# Patient Record
Sex: MALE | Race: WHITE | NOT HISPANIC OR LATINO
[De-identification: names, ages, dates, MRNs, and addresses within clinical notes are randomized per-mention and may not be internally consistent; named-entity substitution may affect disease eponyms.]

---

## 2017-03-20 ENCOUNTER — APPOINTMENT (OUTPATIENT)
Dept: INTERNAL MEDICINE | Facility: CLINIC | Age: 57
End: 2017-03-20

## 2017-03-20 VITALS
BODY MASS INDEX: 25.05 KG/M2 | HEIGHT: 70 IN | WEIGHT: 175 LBS | DIASTOLIC BLOOD PRESSURE: 85 MMHG | SYSTOLIC BLOOD PRESSURE: 122 MMHG

## 2017-03-20 DIAGNOSIS — H10.10 ACUTE ATOPIC CONJUNCTIVITIS, UNSPECIFIED EYE: ICD-10-CM

## 2017-05-12 ENCOUNTER — RX RENEWAL (OUTPATIENT)
Age: 57
End: 2017-05-12

## 2017-05-15 ENCOUNTER — APPOINTMENT (OUTPATIENT)
Dept: INTERNAL MEDICINE | Facility: CLINIC | Age: 57
End: 2017-05-15

## 2017-05-15 VITALS — DIASTOLIC BLOOD PRESSURE: 65 MMHG | HEART RATE: 72 BPM | RESPIRATION RATE: 16 BRPM | SYSTOLIC BLOOD PRESSURE: 115 MMHG

## 2017-05-15 VITALS — BODY MASS INDEX: 24.05 KG/M2 | HEIGHT: 70 IN | WEIGHT: 168 LBS

## 2017-05-15 DIAGNOSIS — C80.1 MALIGNANT (PRIMARY) NEOPLASM, UNSPECIFIED: ICD-10-CM

## 2017-05-15 DIAGNOSIS — R63.4 ABNORMAL WEIGHT LOSS: ICD-10-CM

## 2017-05-16 ENCOUNTER — RESULT CHARGE (OUTPATIENT)
Age: 57
End: 2017-05-16

## 2017-05-16 LAB
ALBUMIN SERPL ELPH-MCNC: 3.8 G/DL
ALP BLD-CCNC: 101 U/L
ALT SERPL-CCNC: 16 U/L
ANION GAP SERPL CALC-SCNC: 13 MMOL/L
AST SERPL-CCNC: 17 U/L
BASOPHILS # BLD AUTO: 0.04 K/UL
BASOPHILS NFR BLD AUTO: 0.5 %
BILIRUB SERPL-MCNC: 0.2 MG/DL
BUN SERPL-MCNC: 20 MG/DL
CALCIUM SERPL-MCNC: 9.4 MG/DL
CHLORIDE SERPL-SCNC: 104 MMOL/L
CO2 SERPL-SCNC: 27 MMOL/L
CREAT SERPL-MCNC: 1.08 MG/DL
EOSINOPHIL # BLD AUTO: 0.95 K/UL
EOSINOPHIL NFR BLD AUTO: 10.8 %
GLUCOSE BLDC GLUCOMTR-MCNC: 87
GLUCOSE SERPL-MCNC: 86 MG/DL
HBA1C MFR BLD HPLC: 5.2
HCT VFR BLD CALC: 45.6 %
HGB BLD-MCNC: 14.1 G/DL
IMM GRANULOCYTES NFR BLD AUTO: 0.1 %
LYMPHOCYTES # BLD AUTO: 1.73 K/UL
LYMPHOCYTES NFR BLD AUTO: 19.7 %
MAN DIFF?: NORMAL
MCHC RBC-ENTMCNC: 29.1 PG
MCHC RBC-ENTMCNC: 30.9 GM/DL
MCV RBC AUTO: 94.2 FL
MONOCYTES # BLD AUTO: 0.95 K/UL
MONOCYTES NFR BLD AUTO: 10.8 %
NEUTROPHILS # BLD AUTO: 5.09 K/UL
NEUTROPHILS NFR BLD AUTO: 58.1 %
PLATELET # BLD AUTO: 262 K/UL
POTASSIUM SERPL-SCNC: 4.4 MMOL/L
PROT SERPL-MCNC: 6.8 G/DL
RBC # BLD: 4.84 M/UL
RBC # FLD: 12.9 %
SODIUM SERPL-SCNC: 144 MMOL/L
T4 FREE SERPL-MCNC: 1.1 NG/DL
TSH SERPL-ACNC: 1.15 UIU/ML
WBC # FLD AUTO: 8.77 K/UL

## 2017-05-24 ENCOUNTER — APPOINTMENT (OUTPATIENT)
Dept: CT IMAGING | Facility: CLINIC | Age: 57
End: 2017-05-24

## 2017-06-30 ENCOUNTER — RX RENEWAL (OUTPATIENT)
Age: 57
End: 2017-06-30

## 2017-07-25 ENCOUNTER — RX RENEWAL (OUTPATIENT)
Age: 57
End: 2017-07-25

## 2017-08-04 ENCOUNTER — APPOINTMENT (OUTPATIENT)
Dept: INTERNAL MEDICINE | Facility: CLINIC | Age: 57
End: 2017-08-04

## 2017-08-29 ENCOUNTER — APPOINTMENT (OUTPATIENT)
Dept: INTERNAL MEDICINE | Facility: CLINIC | Age: 57
End: 2017-08-29

## 2017-09-05 ENCOUNTER — APPOINTMENT (OUTPATIENT)
Dept: INTERNAL MEDICINE | Facility: CLINIC | Age: 57
End: 2017-09-05
Payer: MEDICARE

## 2017-09-05 VITALS
HEART RATE: 90 BPM | DIASTOLIC BLOOD PRESSURE: 68 MMHG | TEMPERATURE: 99.4 F | HEIGHT: 60 IN | WEIGHT: 168 LBS | BODY MASS INDEX: 32.98 KG/M2 | RESPIRATION RATE: 12 BRPM | SYSTOLIC BLOOD PRESSURE: 107 MMHG

## 2017-09-05 DIAGNOSIS — L01.00 IMPETIGO, UNSPECIFIED: ICD-10-CM

## 2017-09-05 PROCEDURE — 99214 OFFICE O/P EST MOD 30 MIN: CPT

## 2017-09-19 ENCOUNTER — APPOINTMENT (OUTPATIENT)
Dept: INTERNAL MEDICINE | Facility: CLINIC | Age: 57
End: 2017-09-19
Payer: MEDICARE

## 2017-09-19 VITALS — SYSTOLIC BLOOD PRESSURE: 124 MMHG | HEART RATE: 80 BPM | RESPIRATION RATE: 14 BRPM | DIASTOLIC BLOOD PRESSURE: 67 MMHG

## 2017-09-19 VITALS — HEIGHT: 65 IN | BODY MASS INDEX: 27.99 KG/M2 | WEIGHT: 168 LBS

## 2017-09-19 PROCEDURE — 99214 OFFICE O/P EST MOD 30 MIN: CPT | Mod: 25

## 2017-09-19 PROCEDURE — 36415 COLL VENOUS BLD VENIPUNCTURE: CPT

## 2017-09-19 PROCEDURE — 90688 IIV4 VACCINE SPLT 0.5 ML IM: CPT

## 2017-09-19 PROCEDURE — G0008: CPT

## 2017-09-19 RX ORDER — CEPHALEXIN 500 MG/1
500 CAPSULE ORAL 3 TIMES DAILY
Qty: 30 | Refills: 0 | Status: DISCONTINUED | COMMUNITY
Start: 2017-09-05 | End: 2017-09-19

## 2017-09-20 LAB
25(OH)D3 SERPL-MCNC: 23 NG/ML
ALBUMIN SERPL ELPH-MCNC: 3.3 G/DL
ALP BLD-CCNC: 73 U/L
ALT SERPL-CCNC: 26 U/L
ANION GAP SERPL CALC-SCNC: 17 MMOL/L
AST SERPL-CCNC: 23 U/L
BASOPHILS # BLD AUTO: 0.03 K/UL
BASOPHILS NFR BLD AUTO: 0.3 %
BILIRUB SERPL-MCNC: 0.3 MG/DL
BUN SERPL-MCNC: 19 MG/DL
CALCIUM SERPL-MCNC: 9.4 MG/DL
CHLORIDE SERPL-SCNC: 101 MMOL/L
CHOLEST SERPL-MCNC: 92 MG/DL
CHOLEST/HDLC SERPL: 2.4 RATIO
CO2 SERPL-SCNC: 24 MMOL/L
CREAT SERPL-MCNC: 1.21 MG/DL
EOSINOPHIL # BLD AUTO: 0.14 K/UL
EOSINOPHIL NFR BLD AUTO: 1.5 %
FOLATE SERPL-MCNC: 11.2 NG/ML
GLUCOSE SERPL-MCNC: 98 MG/DL
HBA1C MFR BLD HPLC: 5.5 %
HCT VFR BLD CALC: 39.8 %
HDLC SERPL-MCNC: 38 MG/DL
HGB BLD-MCNC: 12.2 G/DL
IMM GRANULOCYTES NFR BLD AUTO: 0.2 %
LDLC SERPL CALC-MCNC: 40 MG/DL
LYMPHOCYTES # BLD AUTO: 1.5 K/UL
LYMPHOCYTES NFR BLD AUTO: 16.3 %
MAN DIFF?: NORMAL
MCHC RBC-ENTMCNC: 28.9 PG
MCHC RBC-ENTMCNC: 30.7 GM/DL
MCV RBC AUTO: 94.3 FL
MONOCYTES # BLD AUTO: 0.88 K/UL
MONOCYTES NFR BLD AUTO: 9.6 %
NEUTROPHILS # BLD AUTO: 6.62 K/UL
NEUTROPHILS NFR BLD AUTO: 72.1 %
PLATELET # BLD AUTO: 435 K/UL
POTASSIUM SERPL-SCNC: 4.8 MMOL/L
PROT SERPL-MCNC: 7.3 G/DL
PSA SERPL-MCNC: 0.76 NG/ML
RBC # BLD: 4.22 M/UL
RBC # FLD: 12.6 %
SODIUM SERPL-SCNC: 142 MMOL/L
T4 FREE SERPL-MCNC: 1.5 NG/DL
TRIGL SERPL-MCNC: 69 MG/DL
TSH SERPL-ACNC: 1.48 UIU/ML
VIT B12 SERPL-MCNC: 397 PG/ML
WBC # FLD AUTO: 9.19 K/UL

## 2017-10-11 ENCOUNTER — RX RENEWAL (OUTPATIENT)
Age: 57
End: 2017-10-11

## 2017-11-02 ENCOUNTER — RX RENEWAL (OUTPATIENT)
Age: 57
End: 2017-11-02

## 2017-12-01 ENCOUNTER — MEDICATION RENEWAL (OUTPATIENT)
Age: 57
End: 2017-12-01

## 2017-12-13 ENCOUNTER — OUTPATIENT (OUTPATIENT)
Dept: OUTPATIENT SERVICES | Facility: HOSPITAL | Age: 57
LOS: 1 days | End: 2017-12-13
Payer: MEDICARE

## 2017-12-13 DIAGNOSIS — S06.2X9A DIFFUSE TRAUMATIC BRAIN INJURY WITH LOSS OF CONSCIOUSNESS OF UNSPECIFIED DURATION, INITIAL ENCOUNTER: ICD-10-CM

## 2017-12-13 PROCEDURE — 95819 EEG AWAKE AND ASLEEP: CPT | Mod: 26

## 2017-12-13 PROCEDURE — 95819 EEG AWAKE AND ASLEEP: CPT

## 2017-12-28 ENCOUNTER — RESULT REVIEW (OUTPATIENT)
Age: 57
End: 2017-12-28

## 2017-12-28 ENCOUNTER — OUTPATIENT (OUTPATIENT)
Dept: OUTPATIENT SERVICES | Facility: HOSPITAL | Age: 57
LOS: 1 days | Discharge: HOME | End: 2017-12-28
Payer: MEDICARE

## 2017-12-28 DIAGNOSIS — L89.150 PRESSURE ULCER OF SACRAL REGION, UNSTAGEABLE: ICD-10-CM

## 2017-12-28 DIAGNOSIS — L97.401 NON-PRESSURE CHRONIC ULCER OF UNSPECIFIED HEEL AND MIDFOOT LIMITED TO BREAKDOWN OF SKIN: ICD-10-CM

## 2017-12-28 PROCEDURE — 88304 TISSUE EXAM BY PATHOLOGIST: CPT | Mod: 26

## 2017-12-28 PROCEDURE — 87070 CULTURE OTHR SPECIMN AEROBIC: CPT

## 2017-12-28 PROCEDURE — 11046 DBRDMT MUSC&/FSCA EA ADDL: CPT

## 2017-12-28 PROCEDURE — G0463: CPT | Mod: 25

## 2017-12-28 PROCEDURE — 73630 X-RAY EXAM OF FOOT: CPT | Mod: 26,50

## 2017-12-28 PROCEDURE — 87075 CULTR BACTERIA EXCEPT BLOOD: CPT

## 2017-12-28 PROCEDURE — 11043 DBRDMT MUSC&/FSCA 1ST 20/<: CPT

## 2017-12-28 PROCEDURE — 87186 SC STD MICRODIL/AGAR DIL: CPT

## 2017-12-28 PROCEDURE — 73630 X-RAY EXAM OF FOOT: CPT

## 2017-12-28 PROCEDURE — 88304 TISSUE EXAM BY PATHOLOGIST: CPT

## 2017-12-29 LAB
GRAM STN FLD: SIGNIFICANT CHANGE UP
SPECIMEN SOURCE: SIGNIFICANT CHANGE UP

## 2017-12-30 DIAGNOSIS — L89.313 PRESSURE ULCER OF RIGHT BUTTOCK, STAGE 3: ICD-10-CM

## 2017-12-30 DIAGNOSIS — A49.8 OTHER BACTERIAL INFECTIONS OF UNSPECIFIED SITE: ICD-10-CM

## 2017-12-30 DIAGNOSIS — E66.3 OVERWEIGHT: ICD-10-CM

## 2017-12-30 DIAGNOSIS — L89.623 PRESSURE ULCER OF LEFT HEEL, STAGE 3: ICD-10-CM

## 2017-12-30 DIAGNOSIS — Z82.49 FAMILY HISTORY OF ISCHEMIC HEART DISEASE AND OTHER DISEASES OF THE CIRCULATORY SYSTEM: ICD-10-CM

## 2017-12-30 DIAGNOSIS — L89.221 PRESSURE ULCER OF LEFT HIP, STAGE 1: ICD-10-CM

## 2017-12-30 DIAGNOSIS — R15.9 FULL INCONTINENCE OF FECES: ICD-10-CM

## 2017-12-30 DIAGNOSIS — Z79.899 OTHER LONG TERM (CURRENT) DRUG THERAPY: ICD-10-CM

## 2017-12-30 DIAGNOSIS — Z80.8 FAMILY HISTORY OF MALIGNANT NEOPLASM OF OTHER ORGANS OR SYSTEMS: ICD-10-CM

## 2017-12-30 DIAGNOSIS — R32 UNSPECIFIED URINARY INCONTINENCE: ICD-10-CM

## 2017-12-30 DIAGNOSIS — L89.613 PRESSURE ULCER OF RIGHT HEEL, STAGE 3: ICD-10-CM

## 2017-12-30 DIAGNOSIS — L89.520 PRESSURE ULCER OF LEFT ANKLE, UNSTAGEABLE: ICD-10-CM

## 2017-12-30 DIAGNOSIS — F01.50 VASCULAR DEMENTIA WITHOUT BEHAVIORAL DISTURBANCE: ICD-10-CM

## 2017-12-30 DIAGNOSIS — R47.02 DYSPHASIA: ICD-10-CM

## 2017-12-30 LAB
-  AMIKACIN: SIGNIFICANT CHANGE UP
-  AZTREONAM: SIGNIFICANT CHANGE UP
-  CEFEPIME: SIGNIFICANT CHANGE UP
-  CEFTAZIDIME: SIGNIFICANT CHANGE UP
-  CIPROFLOXACIN: SIGNIFICANT CHANGE UP
-  GENTAMICIN: SIGNIFICANT CHANGE UP
-  IMIPENEM: SIGNIFICANT CHANGE UP
-  LEVOFLOXACIN: SIGNIFICANT CHANGE UP
-  MEROPENEM: SIGNIFICANT CHANGE UP
-  PIPERACILLIN/TAZOBACTAM: SIGNIFICANT CHANGE UP
-  TOBRAMYCIN: SIGNIFICANT CHANGE UP
METHOD TYPE: SIGNIFICANT CHANGE UP

## 2017-12-31 LAB
-  AMIKACIN: SIGNIFICANT CHANGE UP
-  AMPICILLIN/SULBACTAM: SIGNIFICANT CHANGE UP
-  AMPICILLIN/SULBACTAM: SIGNIFICANT CHANGE UP
-  AMPICILLIN: SIGNIFICANT CHANGE UP
-  AZTREONAM: SIGNIFICANT CHANGE UP
-  CEFAZOLIN: SIGNIFICANT CHANGE UP
-  CEFAZOLIN: SIGNIFICANT CHANGE UP
-  CEFEPIME: SIGNIFICANT CHANGE UP
-  CEFOXITIN: SIGNIFICANT CHANGE UP
-  CEFTAZIDIME: SIGNIFICANT CHANGE UP
-  CEFTRIAXONE: SIGNIFICANT CHANGE UP
-  CIPROFLOXACIN: SIGNIFICANT CHANGE UP
-  CIPROFLOXACIN: SIGNIFICANT CHANGE UP
-  CLINDAMYCIN: SIGNIFICANT CHANGE UP
-  ERTAPENEM: SIGNIFICANT CHANGE UP
-  ERYTHROMYCIN: SIGNIFICANT CHANGE UP
-  GENTAMICIN: SIGNIFICANT CHANGE UP
-  GENTAMICIN: SIGNIFICANT CHANGE UP
-  IMIPENEM: SIGNIFICANT CHANGE UP
-  LEVOFLOXACIN: SIGNIFICANT CHANGE UP
-  LEVOFLOXACIN: SIGNIFICANT CHANGE UP
-  MEROPENEM: SIGNIFICANT CHANGE UP
-  MOXIFLOXACIN(AEROBIC): SIGNIFICANT CHANGE UP
-  OXACILLIN: SIGNIFICANT CHANGE UP
-  PENICILLIN: SIGNIFICANT CHANGE UP
-  PIPERACILLIN/TAZOBACTAM: SIGNIFICANT CHANGE UP
-  RIFAMPIN: SIGNIFICANT CHANGE UP
-  TETRACYCLINE: SIGNIFICANT CHANGE UP
-  TOBRAMYCIN: SIGNIFICANT CHANGE UP
-  TRIMETHOPRIM/SULFAMETHOXAZOLE: SIGNIFICANT CHANGE UP
-  TRIMETHOPRIM/SULFAMETHOXAZOLE: SIGNIFICANT CHANGE UP
-  VANCOMYCIN: SIGNIFICANT CHANGE UP
METHOD TYPE: SIGNIFICANT CHANGE UP
METHOD TYPE: SIGNIFICANT CHANGE UP

## 2018-01-02 LAB
CULTURE RESULTS: SIGNIFICANT CHANGE UP
ORGANISM # SPEC MICROSCOPIC CNT: SIGNIFICANT CHANGE UP
SPECIMEN SOURCE: SIGNIFICANT CHANGE UP
SURGICAL PATHOLOGY FINAL REPORT - CH: SIGNIFICANT CHANGE UP

## 2018-01-08 ENCOUNTER — RX RENEWAL (OUTPATIENT)
Age: 58
End: 2018-01-08

## 2018-01-09 ENCOUNTER — MEDICATION RENEWAL (OUTPATIENT)
Age: 58
End: 2018-01-09

## 2018-01-11 ENCOUNTER — OUTPATIENT (OUTPATIENT)
Dept: OUTPATIENT SERVICES | Facility: HOSPITAL | Age: 58
LOS: 1 days | Discharge: HOME | End: 2018-01-11
Payer: MEDICARE

## 2018-01-11 DIAGNOSIS — L89.623 PRESSURE ULCER OF LEFT HEEL, STAGE 3: ICD-10-CM

## 2018-01-11 PROCEDURE — 11042 DBRDMT SUBQ TIS 1ST 20SQCM/<: CPT

## 2018-01-14 DIAGNOSIS — F01.50 VASCULAR DEMENTIA WITHOUT BEHAVIORAL DISTURBANCE: ICD-10-CM

## 2018-01-14 DIAGNOSIS — R32 UNSPECIFIED URINARY INCONTINENCE: ICD-10-CM

## 2018-01-14 DIAGNOSIS — E66.3 OVERWEIGHT: ICD-10-CM

## 2018-01-14 DIAGNOSIS — L89.221 PRESSURE ULCER OF LEFT HIP, STAGE 1: ICD-10-CM

## 2018-01-14 DIAGNOSIS — Z80.8 FAMILY HISTORY OF MALIGNANT NEOPLASM OF OTHER ORGANS OR SYSTEMS: ICD-10-CM

## 2018-01-14 DIAGNOSIS — R15.9 FULL INCONTINENCE OF FECES: ICD-10-CM

## 2018-01-14 DIAGNOSIS — L89.520 PRESSURE ULCER OF LEFT ANKLE, UNSTAGEABLE: ICD-10-CM

## 2018-01-14 DIAGNOSIS — L89.313 PRESSURE ULCER OF RIGHT BUTTOCK, STAGE 3: ICD-10-CM

## 2018-01-14 DIAGNOSIS — L89.613 PRESSURE ULCER OF RIGHT HEEL, STAGE 3: ICD-10-CM

## 2018-01-14 DIAGNOSIS — Z82.49 FAMILY HISTORY OF ISCHEMIC HEART DISEASE AND OTHER DISEASES OF THE CIRCULATORY SYSTEM: ICD-10-CM

## 2018-01-14 DIAGNOSIS — L89.623 PRESSURE ULCER OF LEFT HEEL, STAGE 3: ICD-10-CM

## 2018-01-14 DIAGNOSIS — Z79.899 OTHER LONG TERM (CURRENT) DRUG THERAPY: ICD-10-CM

## 2018-01-14 DIAGNOSIS — R47.02 DYSPHASIA: ICD-10-CM

## 2018-01-23 ENCOUNTER — OUTPATIENT (OUTPATIENT)
Dept: OUTPATIENT SERVICES | Facility: HOSPITAL | Age: 58
LOS: 1 days | Discharge: HOME | End: 2018-01-23
Payer: MEDICARE

## 2018-01-23 DIAGNOSIS — L89.623 PRESSURE ULCER OF LEFT HEEL, STAGE 3: ICD-10-CM

## 2018-01-23 PROCEDURE — 97602 WOUND(S) CARE NON-SELECTIVE: CPT

## 2018-01-23 PROCEDURE — 99213 OFFICE O/P EST LOW 20 MIN: CPT

## 2018-01-25 DIAGNOSIS — L89.520 PRESSURE ULCER OF LEFT ANKLE, UNSTAGEABLE: ICD-10-CM

## 2018-01-25 DIAGNOSIS — L89.313 PRESSURE ULCER OF RIGHT BUTTOCK, STAGE 3: ICD-10-CM

## 2018-01-25 DIAGNOSIS — R15.9 FULL INCONTINENCE OF FECES: ICD-10-CM

## 2018-01-25 DIAGNOSIS — Z80.8 FAMILY HISTORY OF MALIGNANT NEOPLASM OF OTHER ORGANS OR SYSTEMS: ICD-10-CM

## 2018-01-25 DIAGNOSIS — R47.02 DYSPHASIA: ICD-10-CM

## 2018-01-25 DIAGNOSIS — E66.3 OVERWEIGHT: ICD-10-CM

## 2018-01-25 DIAGNOSIS — L89.613 PRESSURE ULCER OF RIGHT HEEL, STAGE 3: ICD-10-CM

## 2018-01-25 DIAGNOSIS — R32 UNSPECIFIED URINARY INCONTINENCE: ICD-10-CM

## 2018-01-25 DIAGNOSIS — F01.50 VASCULAR DEMENTIA WITHOUT BEHAVIORAL DISTURBANCE: ICD-10-CM

## 2018-01-25 DIAGNOSIS — Z79.899 OTHER LONG TERM (CURRENT) DRUG THERAPY: ICD-10-CM

## 2018-01-25 DIAGNOSIS — Z82.49 FAMILY HISTORY OF ISCHEMIC HEART DISEASE AND OTHER DISEASES OF THE CIRCULATORY SYSTEM: ICD-10-CM

## 2018-01-25 DIAGNOSIS — L84 CORNS AND CALLOSITIES: ICD-10-CM

## 2018-01-25 DIAGNOSIS — L89.623 PRESSURE ULCER OF LEFT HEEL, STAGE 3: ICD-10-CM

## 2018-02-14 ENCOUNTER — OUTPATIENT (OUTPATIENT)
Dept: OUTPATIENT SERVICES | Facility: HOSPITAL | Age: 58
LOS: 1 days | Discharge: HOME | End: 2018-02-14
Payer: MEDICARE

## 2018-02-14 DIAGNOSIS — L89.623 PRESSURE ULCER OF LEFT HEEL, STAGE 3: ICD-10-CM

## 2018-02-14 PROCEDURE — 97602 WOUND(S) CARE NON-SELECTIVE: CPT

## 2018-02-14 PROCEDURE — 99213 OFFICE O/P EST LOW 20 MIN: CPT

## 2018-02-15 DIAGNOSIS — Z80.8 FAMILY HISTORY OF MALIGNANT NEOPLASM OF OTHER ORGANS OR SYSTEMS: ICD-10-CM

## 2018-02-15 DIAGNOSIS — Z82.49 FAMILY HISTORY OF ISCHEMIC HEART DISEASE AND OTHER DISEASES OF THE CIRCULATORY SYSTEM: ICD-10-CM

## 2018-02-15 DIAGNOSIS — L89.153 PRESSURE ULCER OF SACRAL REGION, STAGE 3: ICD-10-CM

## 2018-02-15 DIAGNOSIS — L89.613 PRESSURE ULCER OF RIGHT HEEL, STAGE 3: ICD-10-CM

## 2018-02-15 DIAGNOSIS — L89.623 PRESSURE ULCER OF LEFT HEEL, STAGE 3: ICD-10-CM

## 2018-02-15 DIAGNOSIS — R32 UNSPECIFIED URINARY INCONTINENCE: ICD-10-CM

## 2018-02-15 DIAGNOSIS — L89.313 PRESSURE ULCER OF RIGHT BUTTOCK, STAGE 3: ICD-10-CM

## 2018-02-15 DIAGNOSIS — E66.3 OVERWEIGHT: ICD-10-CM

## 2018-02-15 DIAGNOSIS — R15.9 FULL INCONTINENCE OF FECES: ICD-10-CM

## 2018-02-15 DIAGNOSIS — L89.520 PRESSURE ULCER OF LEFT ANKLE, UNSTAGEABLE: ICD-10-CM

## 2018-02-15 DIAGNOSIS — F01.50 VASCULAR DEMENTIA WITHOUT BEHAVIORAL DISTURBANCE: ICD-10-CM

## 2018-02-15 DIAGNOSIS — R47.02 DYSPHASIA: ICD-10-CM

## 2018-02-15 DIAGNOSIS — L84 CORNS AND CALLOSITIES: ICD-10-CM

## 2018-02-15 DIAGNOSIS — Z79.899 OTHER LONG TERM (CURRENT) DRUG THERAPY: ICD-10-CM

## 2018-02-19 ENCOUNTER — EMERGENCY (EMERGENCY)
Facility: HOSPITAL | Age: 58
LOS: 1 days | Discharge: HOME | End: 2018-02-19
Attending: STUDENT IN AN ORGANIZED HEALTH CARE EDUCATION/TRAINING PROGRAM
Payer: MEDICARE

## 2018-02-19 VITALS
OXYGEN SATURATION: 100 % | SYSTOLIC BLOOD PRESSURE: 101 MMHG | HEART RATE: 83 BPM | DIASTOLIC BLOOD PRESSURE: 73 MMHG | TEMPERATURE: 98 F | RESPIRATION RATE: 20 BRPM

## 2018-02-19 LAB
APTT BLD: 33.4 SEC — SIGNIFICANT CHANGE UP (ref 27.5–37.4)
BASOPHILS # BLD AUTO: 0 K/UL — SIGNIFICANT CHANGE UP (ref 0–0.2)
BASOPHILS NFR BLD AUTO: 0.3 % — SIGNIFICANT CHANGE UP (ref 0–2)
EOSINOPHIL # BLD AUTO: 0.2 K/UL — SIGNIFICANT CHANGE UP (ref 0–0.5)
EOSINOPHIL NFR BLD AUTO: 3.3 % — SIGNIFICANT CHANGE UP (ref 0–6)
HCT VFR BLD CALC: 42.7 % — SIGNIFICANT CHANGE UP (ref 42–52)
HGB BLD-MCNC: 13.3 G/DL — LOW (ref 14–18)
INR BLD: 1.1 RATIO — SIGNIFICANT CHANGE UP (ref 0.88–1.16)
LYMPHOCYTES # BLD AUTO: 1.7 K/UL — SIGNIFICANT CHANGE UP (ref 1–4.8)
LYMPHOCYTES # BLD AUTO: 27.6 % — SIGNIFICANT CHANGE UP (ref 20–55)
MCHC RBC-ENTMCNC: 27.7 PG — SIGNIFICANT CHANGE UP (ref 27–31)
MCHC RBC-ENTMCNC: 31.1 G/DL — LOW (ref 32–36)
MCV RBC AUTO: 89 FL — SIGNIFICANT CHANGE UP (ref 80–94)
MONOCYTES # BLD AUTO: 0.7 K/UL — SIGNIFICANT CHANGE UP (ref 0–0.8)
MONOCYTES NFR BLD AUTO: 11 % — HIGH (ref 3–10)
NEUTROPHILS # BLD AUTO: 3.6 K/UL — SIGNIFICANT CHANGE UP (ref 1.8–8)
NEUTROPHILS NFR BLD AUTO: 57.6 % — SIGNIFICANT CHANGE UP (ref 37–73)
PLATELET # BLD AUTO: 257 K/UL — SIGNIFICANT CHANGE UP (ref 150–400)
PROTHROM AB SERPL-ACNC: 12.1 SEC — SIGNIFICANT CHANGE UP (ref 9.8–12.7)
RBC # BLD: 4.8 M/UL — SIGNIFICANT CHANGE UP (ref 4.6–6.2)
RBC # FLD: 13.1 % — SIGNIFICANT CHANGE UP (ref 11–15.6)
WBC # BLD: 6.3 K/UL — SIGNIFICANT CHANGE UP (ref 4.8–10.8)
WBC # FLD AUTO: 6.3 K/UL — SIGNIFICANT CHANGE UP (ref 4.8–10.8)

## 2018-02-19 PROCEDURE — 85610 PROTHROMBIN TIME: CPT

## 2018-02-19 PROCEDURE — 84484 ASSAY OF TROPONIN QUANT: CPT

## 2018-02-19 PROCEDURE — 93010 ELECTROCARDIOGRAM REPORT: CPT

## 2018-02-19 PROCEDURE — 70450 CT HEAD/BRAIN W/O DYE: CPT

## 2018-02-19 PROCEDURE — 93005 ELECTROCARDIOGRAM TRACING: CPT

## 2018-02-19 PROCEDURE — 36415 COLL VENOUS BLD VENIPUNCTURE: CPT

## 2018-02-19 PROCEDURE — 99284 EMERGENCY DEPT VISIT MOD MDM: CPT

## 2018-02-19 PROCEDURE — 85730 THROMBOPLASTIN TIME PARTIAL: CPT

## 2018-02-19 PROCEDURE — 80053 COMPREHEN METABOLIC PANEL: CPT

## 2018-02-19 PROCEDURE — 82962 GLUCOSE BLOOD TEST: CPT

## 2018-02-19 PROCEDURE — 85027 COMPLETE CBC AUTOMATED: CPT

## 2018-02-19 PROCEDURE — 71045 X-RAY EXAM CHEST 1 VIEW: CPT

## 2018-02-19 PROCEDURE — 80307 DRUG TEST PRSMV CHEM ANLYZR: CPT

## 2018-02-19 PROCEDURE — 70450 CT HEAD/BRAIN W/O DYE: CPT | Mod: 26

## 2018-02-19 PROCEDURE — 99284 EMERGENCY DEPT VISIT MOD MDM: CPT | Mod: 25

## 2018-02-19 NOTE — ED PROVIDER NOTE - PROGRESS NOTE DETAILS
Results reviewed with family who is also primary caretaker. Admission recommended but she states given pt's chronic disease she feels most comfortable with D/C to f/u with his neurologist stating the hospital will make his general condition worse. Family informed sx are likely secondary to disease but it is unclear which is why admission is suggested. Pt's mother however would rather be D/C. Pt is stable and appears tired at this time.

## 2018-02-19 NOTE — ED ADULT TRIAGE NOTE - CHIEF COMPLAINT QUOTE
Pt BIBA for AMS.  Pt is non-verbal from prior stroke.  Does not follow commands.  As per Mother, pt had change in mental status at approx 2000.  Dr. Dougherty called to bedside r/o stroke.

## 2018-02-19 NOTE — ED PROVIDER NOTE - OBJECTIVE STATEMENT
59 y/o M w/ PMHx of CVA presents to ED c/o AMS x2 hours. Per mother, pt was unresponsive to commands and more fatigued than baseline around 20:00 today. Pt's mother notes he was unable to tolerate PO as she attempted to give him ice cream. Pt's sx did not resolve prompting her to call EMS at 22:04. Per EMS, pt was able to follow some commands and became more like his normal baseline around 22:42. Pt took medication today (last dose 20:00 today). Pt's mother reports after CVA 5 years ago, pt began to deteriorate and has been nonverbal x5 months. He walks with walker. Denies facial droop or any other complaints at this time. 59 y/o M w/ PMHx of CVA presents to ED c/o AMS x2 hours. Per mother, pt was unresponsive to commands and more fatigued than baseline around 20:00 today. Pt's mother notes he was unable to tolerate PO during episode but ate dinner at 19:00. Pt's sx did not resolve prompting her to call EMS at 22:04. Per EMS, pt was able to follow some commands and became more like his normal baseline around 22:42. Pt took his evening medications today at 20:00. Pt's mother reports after CVA 5 years ago, pt began to deteriorate and has been nonverbal x5 months. He walks with walker. Pt's mother notes pt has a CT scan scheduled for the near future, ordered by his neurologist to assess for change in medication to decrease sedative effects. Denies facial droop or any other complaints at this time. 57 y/o M w/ PMHx of CVA presents to ED c/o AMS x2 hours. Per mother, pt was unresponsive to commands and more fatigued than baseline around 20:00 today. Pt's mother notes he was unable to tolerate PO during episode but ate dinner at 19:00. Pt's sx did not resolve prompting her to call EMS at 22:04. Per EMS, pt was able to follow some commands and became more like his normal baseline around 22:42. Pt took his evening medications today at 20:00. Pt's mother reports after CVA 5 years ago, pt began to deteriorate and has been nonverbal x5 months and continues to get worse. He walks with walker. Pt's mother notes pt has a CT scan scheduled for the near future, ordered by his neurologist to assess for change in medication to decrease sedative effects. Denies facial droop or any other complaints at this time.

## 2018-02-20 VITALS
OXYGEN SATURATION: 100 % | SYSTOLIC BLOOD PRESSURE: 115 MMHG | HEART RATE: 78 BPM | RESPIRATION RATE: 20 BRPM | DIASTOLIC BLOOD PRESSURE: 70 MMHG | TEMPERATURE: 98 F

## 2018-02-20 LAB
ALBUMIN SERPL ELPH-MCNC: 3.6 G/DL — SIGNIFICANT CHANGE UP (ref 3.3–5.2)
ALP SERPL-CCNC: 74 U/L — SIGNIFICANT CHANGE UP (ref 40–120)
ALT FLD-CCNC: 11 U/L — SIGNIFICANT CHANGE UP
ANION GAP SERPL CALC-SCNC: 12 MMOL/L — SIGNIFICANT CHANGE UP (ref 5–17)
AST SERPL-CCNC: 14 U/L — SIGNIFICANT CHANGE UP
BILIRUB SERPL-MCNC: <0.2 MG/DL — LOW (ref 0.4–2)
BUN SERPL-MCNC: 16 MG/DL — SIGNIFICANT CHANGE UP (ref 8–20)
CALCIUM SERPL-MCNC: 9.5 MG/DL — SIGNIFICANT CHANGE UP (ref 8.6–10.2)
CHLORIDE SERPL-SCNC: 101 MMOL/L — SIGNIFICANT CHANGE UP (ref 98–107)
CO2 SERPL-SCNC: 28 MMOL/L — SIGNIFICANT CHANGE UP (ref 22–29)
CREAT SERPL-MCNC: 0.87 MG/DL — SIGNIFICANT CHANGE UP (ref 0.5–1.3)
ETHANOL SERPL-MCNC: <10 MG/DL — SIGNIFICANT CHANGE UP
GLUCOSE SERPL-MCNC: 100 MG/DL — SIGNIFICANT CHANGE UP (ref 70–115)
POTASSIUM SERPL-MCNC: 4.5 MMOL/L — SIGNIFICANT CHANGE UP (ref 3.5–5.3)
POTASSIUM SERPL-SCNC: 4.5 MMOL/L — SIGNIFICANT CHANGE UP (ref 3.5–5.3)
PROT SERPL-MCNC: 7.4 G/DL — SIGNIFICANT CHANGE UP (ref 6.6–8.7)
SODIUM SERPL-SCNC: 141 MMOL/L — SIGNIFICANT CHANGE UP (ref 135–145)
TROPONIN T SERPL-MCNC: <0.01 NG/ML — SIGNIFICANT CHANGE UP (ref 0–0.06)

## 2018-02-20 PROCEDURE — 71045 X-RAY EXAM CHEST 1 VIEW: CPT | Mod: 26

## 2018-02-20 NOTE — ED ADULT NURSE NOTE - OBJECTIVE STATEMENT
Mother reports pt "sort of passed out" ~2000, reports pt was not "coming to". Reports called EMS and pt "came to on bus". Pt presents non-verbal post stroke 5 years ago, caregiver reports this as pt baseline. Pt presents with bed sores on bilat heels, and sacrum, caregiver reports pt recvign home care wound services daily and goes to wound hospital every two weeks.

## 2018-02-20 NOTE — ED ADULT NURSE REASSESSMENT NOTE - NS ED NURSE REASSESS COMMENT FT1
MD at pt bedside, discussed with caregiver, pt to be discharges at this time, caregiver verbalizes understanding and agree with POC, awaiting transportation at this time.  Pt safety maintained.

## 2018-02-21 ENCOUNTER — CHART COPY (OUTPATIENT)
Age: 58
End: 2018-02-21

## 2018-02-23 ENCOUNTER — APPOINTMENT (OUTPATIENT)
Dept: CT IMAGING | Facility: CLINIC | Age: 58
End: 2018-02-23

## 2018-02-28 ENCOUNTER — OUTPATIENT (OUTPATIENT)
Dept: OUTPATIENT SERVICES | Facility: HOSPITAL | Age: 58
LOS: 1 days | Discharge: HOME | End: 2018-02-28
Payer: MEDICARE

## 2018-02-28 DIAGNOSIS — L89.523 PRESSURE ULCER OF LEFT ANKLE, STAGE 3: ICD-10-CM

## 2018-02-28 LAB
ALBUMIN SERPL ELPH-MCNC: 3.2 G/DL — LOW (ref 3.3–5)
ALP SERPL-CCNC: 100 U/L — SIGNIFICANT CHANGE UP (ref 40–120)
ALT FLD-CCNC: 20 U/L — SIGNIFICANT CHANGE UP (ref 12–78)
ANION GAP SERPL CALC-SCNC: 4 MMOL/L — LOW (ref 5–17)
AST SERPL-CCNC: 12 U/L — LOW (ref 15–37)
BASOPHILS # BLD AUTO: 0 K/UL — SIGNIFICANT CHANGE UP (ref 0–0.2)
BASOPHILS NFR BLD AUTO: 0.7 % — SIGNIFICANT CHANGE UP (ref 0–2)
BILIRUB SERPL-MCNC: 0.2 MG/DL — SIGNIFICANT CHANGE UP (ref 0.2–1.2)
BUN SERPL-MCNC: 15 MG/DL — SIGNIFICANT CHANGE UP (ref 7–23)
CALCIUM SERPL-MCNC: 8.8 MG/DL — SIGNIFICANT CHANGE UP (ref 8.5–10.1)
CHLORIDE SERPL-SCNC: 105 MMOL/L — SIGNIFICANT CHANGE UP (ref 96–108)
CO2 SERPL-SCNC: 32 MMOL/L — HIGH (ref 22–31)
CREAT SERPL-MCNC: 0.91 MG/DL — SIGNIFICANT CHANGE UP (ref 0.5–1.3)
EOSINOPHIL # BLD AUTO: 0.2 K/UL — SIGNIFICANT CHANGE UP (ref 0–0.5)
EOSINOPHIL NFR BLD AUTO: 2.8 % — SIGNIFICANT CHANGE UP (ref 0–6)
ERYTHROCYTE [SEDIMENTATION RATE] IN BLOOD: 14 MM/HR — SIGNIFICANT CHANGE UP (ref 0–20)
GLUCOSE SERPL-MCNC: 106 MG/DL — HIGH (ref 70–99)
HBA1C BLD-MCNC: 5.4 % — SIGNIFICANT CHANGE UP (ref 4–5.6)
HCT VFR BLD CALC: 41.7 % — SIGNIFICANT CHANGE UP (ref 39–50)
HGB BLD-MCNC: 13.8 G/DL — SIGNIFICANT CHANGE UP (ref 13–17)
LYMPHOCYTES # BLD AUTO: 1.5 K/UL — SIGNIFICANT CHANGE UP (ref 1–3.3)
LYMPHOCYTES # BLD AUTO: 21.4 % — SIGNIFICANT CHANGE UP (ref 13–44)
MCHC RBC-ENTMCNC: 28.4 PG — SIGNIFICANT CHANGE UP (ref 27–34)
MCHC RBC-ENTMCNC: 33.2 GM/DL — SIGNIFICANT CHANGE UP (ref 32–36)
MCV RBC AUTO: 85.5 FL — SIGNIFICANT CHANGE UP (ref 80–100)
MONOCYTES # BLD AUTO: 0.7 K/UL — SIGNIFICANT CHANGE UP (ref 0–0.9)
MONOCYTES NFR BLD AUTO: 9.8 % — HIGH (ref 1–9)
NEUTROPHILS # BLD AUTO: 4.5 K/UL — SIGNIFICANT CHANGE UP (ref 1.8–7.4)
NEUTROPHILS NFR BLD AUTO: 65.4 % — SIGNIFICANT CHANGE UP (ref 43–77)
PLATELET # BLD AUTO: 240 K/UL — SIGNIFICANT CHANGE UP (ref 150–400)
POTASSIUM SERPL-MCNC: 4 MMOL/L — SIGNIFICANT CHANGE UP (ref 3.5–5.3)
POTASSIUM SERPL-SCNC: 4 MMOL/L — SIGNIFICANT CHANGE UP (ref 3.5–5.3)
PROT SERPL-MCNC: 7.3 G/DL — SIGNIFICANT CHANGE UP (ref 6–8.3)
RBC # BLD: 4.88 M/UL — SIGNIFICANT CHANGE UP (ref 4.2–5.8)
RBC # FLD: 11.9 % — SIGNIFICANT CHANGE UP (ref 10.3–14.5)
SODIUM SERPL-SCNC: 141 MMOL/L — SIGNIFICANT CHANGE UP (ref 135–145)
WBC # BLD: 6.8 K/UL — SIGNIFICANT CHANGE UP (ref 3.8–10.5)
WBC # FLD AUTO: 6.8 K/UL — SIGNIFICANT CHANGE UP (ref 3.8–10.5)

## 2018-02-28 PROCEDURE — 86140 C-REACTIVE PROTEIN: CPT

## 2018-02-28 PROCEDURE — 83036 HEMOGLOBIN GLYCOSYLATED A1C: CPT

## 2018-02-28 PROCEDURE — 80053 COMPREHEN METABOLIC PANEL: CPT

## 2018-02-28 PROCEDURE — 85027 COMPLETE CBC AUTOMATED: CPT

## 2018-02-28 PROCEDURE — 85652 RBC SED RATE AUTOMATED: CPT

## 2018-02-28 PROCEDURE — 97602 WOUND(S) CARE NON-SELECTIVE: CPT

## 2018-03-01 DIAGNOSIS — R15.9 FULL INCONTINENCE OF FECES: ICD-10-CM

## 2018-03-01 DIAGNOSIS — F01.50 VASCULAR DEMENTIA WITHOUT BEHAVIORAL DISTURBANCE: ICD-10-CM

## 2018-03-01 DIAGNOSIS — L89.623 PRESSURE ULCER OF LEFT HEEL, STAGE 3: ICD-10-CM

## 2018-03-01 DIAGNOSIS — R32 UNSPECIFIED URINARY INCONTINENCE: ICD-10-CM

## 2018-03-01 DIAGNOSIS — L89.313 PRESSURE ULCER OF RIGHT BUTTOCK, STAGE 3: ICD-10-CM

## 2018-03-01 DIAGNOSIS — Z80.8 FAMILY HISTORY OF MALIGNANT NEOPLASM OF OTHER ORGANS OR SYSTEMS: ICD-10-CM

## 2018-03-01 DIAGNOSIS — L89.153 PRESSURE ULCER OF SACRAL REGION, STAGE 3: ICD-10-CM

## 2018-03-01 DIAGNOSIS — R47.02 DYSPHASIA: ICD-10-CM

## 2018-03-01 DIAGNOSIS — Z82.49 FAMILY HISTORY OF ISCHEMIC HEART DISEASE AND OTHER DISEASES OF THE CIRCULATORY SYSTEM: ICD-10-CM

## 2018-03-01 DIAGNOSIS — E66.3 OVERWEIGHT: ICD-10-CM

## 2018-03-01 DIAGNOSIS — Z79.899 OTHER LONG TERM (CURRENT) DRUG THERAPY: ICD-10-CM

## 2018-03-01 DIAGNOSIS — L89.520 PRESSURE ULCER OF LEFT ANKLE, UNSTAGEABLE: ICD-10-CM

## 2018-03-01 LAB — CRP SERPL-MCNC: <0.2 MG/DL — SIGNIFICANT CHANGE UP (ref 0–0.4)

## 2018-03-08 ENCOUNTER — OUTPATIENT (OUTPATIENT)
Dept: OUTPATIENT SERVICES | Facility: HOSPITAL | Age: 58
LOS: 1 days | Discharge: HOME | End: 2018-03-08
Payer: MEDICARE

## 2018-03-08 DIAGNOSIS — L89.623 PRESSURE ULCER OF LEFT HEEL, STAGE 3: ICD-10-CM

## 2018-03-08 PROCEDURE — G0463: CPT | Mod: 25

## 2018-03-08 PROCEDURE — 97602 WOUND(S) CARE NON-SELECTIVE: CPT

## 2018-03-10 DIAGNOSIS — R15.9 FULL INCONTINENCE OF FECES: ICD-10-CM

## 2018-03-10 DIAGNOSIS — Z79.899 OTHER LONG TERM (CURRENT) DRUG THERAPY: ICD-10-CM

## 2018-03-10 DIAGNOSIS — F01.50 VASCULAR DEMENTIA WITHOUT BEHAVIORAL DISTURBANCE: ICD-10-CM

## 2018-03-10 DIAGNOSIS — R21 RASH AND OTHER NONSPECIFIC SKIN ERUPTION: ICD-10-CM

## 2018-03-10 DIAGNOSIS — Z82.49 FAMILY HISTORY OF ISCHEMIC HEART DISEASE AND OTHER DISEASES OF THE CIRCULATORY SYSTEM: ICD-10-CM

## 2018-03-10 DIAGNOSIS — L89.520 PRESSURE ULCER OF LEFT ANKLE, UNSTAGEABLE: ICD-10-CM

## 2018-03-10 DIAGNOSIS — L89.623 PRESSURE ULCER OF LEFT HEEL, STAGE 3: ICD-10-CM

## 2018-03-10 DIAGNOSIS — L89.153 PRESSURE ULCER OF SACRAL REGION, STAGE 3: ICD-10-CM

## 2018-03-10 DIAGNOSIS — R47.02 DYSPHASIA: ICD-10-CM

## 2018-03-10 DIAGNOSIS — L89.313 PRESSURE ULCER OF RIGHT BUTTOCK, STAGE 3: ICD-10-CM

## 2018-03-10 DIAGNOSIS — R32 UNSPECIFIED URINARY INCONTINENCE: ICD-10-CM

## 2018-03-10 DIAGNOSIS — Z80.8 FAMILY HISTORY OF MALIGNANT NEOPLASM OF OTHER ORGANS OR SYSTEMS: ICD-10-CM

## 2018-03-17 ENCOUNTER — OUTPATIENT (OUTPATIENT)
Dept: OUTPATIENT SERVICES | Facility: HOSPITAL | Age: 58
LOS: 1 days | End: 2018-03-17
Payer: MEDICARE

## 2018-03-17 ENCOUNTER — APPOINTMENT (OUTPATIENT)
Dept: MRI IMAGING | Facility: CLINIC | Age: 58
End: 2018-03-17
Payer: MEDICARE

## 2018-03-17 DIAGNOSIS — Z00.8 ENCOUNTER FOR OTHER GENERAL EXAMINATION: ICD-10-CM

## 2018-03-17 PROCEDURE — 73718 MRI LOWER EXTREMITY W/O DYE: CPT

## 2018-03-17 PROCEDURE — 73718 MRI LOWER EXTREMITY W/O DYE: CPT | Mod: 26,LT

## 2018-03-29 ENCOUNTER — OUTPATIENT (OUTPATIENT)
Dept: OUTPATIENT SERVICES | Facility: HOSPITAL | Age: 58
LOS: 1 days | Discharge: HOME | End: 2018-03-29
Payer: MEDICARE

## 2018-03-29 DIAGNOSIS — L89.623 PRESSURE ULCER OF LEFT HEEL, STAGE 3: ICD-10-CM

## 2018-03-29 PROCEDURE — 11042 DBRDMT SUBQ TIS 1ST 20SQCM/<: CPT

## 2018-03-31 DIAGNOSIS — L89.893 PRESSURE ULCER OF OTHER SITE, STAGE 3: ICD-10-CM

## 2018-03-31 DIAGNOSIS — R15.9 FULL INCONTINENCE OF FECES: ICD-10-CM

## 2018-03-31 DIAGNOSIS — R47.02 DYSPHASIA: ICD-10-CM

## 2018-03-31 DIAGNOSIS — Z82.49 FAMILY HISTORY OF ISCHEMIC HEART DISEASE AND OTHER DISEASES OF THE CIRCULATORY SYSTEM: ICD-10-CM

## 2018-03-31 DIAGNOSIS — Z79.899 OTHER LONG TERM (CURRENT) DRUG THERAPY: ICD-10-CM

## 2018-03-31 DIAGNOSIS — Z80.8 FAMILY HISTORY OF MALIGNANT NEOPLASM OF OTHER ORGANS OR SYSTEMS: ICD-10-CM

## 2018-03-31 DIAGNOSIS — L89.623 PRESSURE ULCER OF LEFT HEEL, STAGE 3: ICD-10-CM

## 2018-03-31 DIAGNOSIS — R32 UNSPECIFIED URINARY INCONTINENCE: ICD-10-CM

## 2018-03-31 DIAGNOSIS — E66.3 OVERWEIGHT: ICD-10-CM

## 2018-04-04 ENCOUNTER — APPOINTMENT (OUTPATIENT)
Dept: DERMATOLOGY | Facility: CLINIC | Age: 58
End: 2018-04-04
Payer: MEDICARE

## 2018-04-04 PROCEDURE — 99202 OFFICE O/P NEW SF 15 MIN: CPT

## 2018-04-20 ENCOUNTER — OUTPATIENT (OUTPATIENT)
Dept: OUTPATIENT SERVICES | Facility: HOSPITAL | Age: 58
LOS: 1 days | Discharge: HOME | End: 2018-04-20
Payer: MEDICARE

## 2018-04-20 DIAGNOSIS — L89.623 PRESSURE ULCER OF LEFT HEEL, STAGE 3: ICD-10-CM

## 2018-04-20 PROCEDURE — 11042 DBRDMT SUBQ TIS 1ST 20SQCM/<: CPT

## 2018-05-04 ENCOUNTER — OUTPATIENT (OUTPATIENT)
Dept: OUTPATIENT SERVICES | Facility: HOSPITAL | Age: 58
LOS: 1 days | Discharge: HOME | End: 2018-05-04
Payer: MEDICARE

## 2018-05-04 DIAGNOSIS — L89.623 PRESSURE ULCER OF LEFT HEEL, STAGE 3: ICD-10-CM

## 2018-05-04 LAB
ALBUMIN SERPL ELPH-MCNC: 3.4 G/DL — SIGNIFICANT CHANGE UP (ref 3.3–5)
ALP SERPL-CCNC: 94 U/L — SIGNIFICANT CHANGE UP (ref 40–120)
ALT FLD-CCNC: 26 U/L — SIGNIFICANT CHANGE UP (ref 12–78)
ANION GAP SERPL CALC-SCNC: 5 MMOL/L — SIGNIFICANT CHANGE UP (ref 5–17)
AST SERPL-CCNC: 15 U/L — SIGNIFICANT CHANGE UP (ref 15–37)
BASOPHILS # BLD AUTO: 0 K/UL — SIGNIFICANT CHANGE UP (ref 0–0.2)
BASOPHILS NFR BLD AUTO: 0.4 % — SIGNIFICANT CHANGE UP (ref 0–2)
BILIRUB SERPL-MCNC: 0.3 MG/DL — SIGNIFICANT CHANGE UP (ref 0.2–1.2)
BUN SERPL-MCNC: 16 MG/DL — SIGNIFICANT CHANGE UP (ref 7–23)
CALCIUM SERPL-MCNC: 9.1 MG/DL — SIGNIFICANT CHANGE UP (ref 8.5–10.1)
CHLORIDE SERPL-SCNC: 106 MMOL/L — SIGNIFICANT CHANGE UP (ref 96–108)
CO2 SERPL-SCNC: 33 MMOL/L — HIGH (ref 22–31)
CREAT SERPL-MCNC: 0.95 MG/DL — SIGNIFICANT CHANGE UP (ref 0.5–1.3)
CRP SERPL-MCNC: 0.3 MG/DL — SIGNIFICANT CHANGE UP (ref 0–0.4)
EOSINOPHIL # BLD AUTO: 0.1 K/UL — SIGNIFICANT CHANGE UP (ref 0–0.5)
EOSINOPHIL NFR BLD AUTO: 1.9 % — SIGNIFICANT CHANGE UP (ref 0–6)
ERYTHROCYTE [SEDIMENTATION RATE] IN BLOOD: 11 MM/HR — SIGNIFICANT CHANGE UP (ref 0–20)
GLUCOSE SERPL-MCNC: 83 MG/DL — SIGNIFICANT CHANGE UP (ref 70–99)
HBA1C BLD-MCNC: 5.5 % — SIGNIFICANT CHANGE UP (ref 4–5.6)
HCT VFR BLD CALC: 43.1 % — SIGNIFICANT CHANGE UP (ref 39–50)
HGB BLD-MCNC: 14.2 G/DL — SIGNIFICANT CHANGE UP (ref 13–17)
LYMPHOCYTES # BLD AUTO: 1.5 K/UL — SIGNIFICANT CHANGE UP (ref 1–3.3)
LYMPHOCYTES # BLD AUTO: 23.6 % — SIGNIFICANT CHANGE UP (ref 13–44)
MCHC RBC-ENTMCNC: 28.8 PG — SIGNIFICANT CHANGE UP (ref 27–34)
MCHC RBC-ENTMCNC: 32.8 GM/DL — SIGNIFICANT CHANGE UP (ref 32–36)
MCV RBC AUTO: 87.9 FL — SIGNIFICANT CHANGE UP (ref 80–100)
MONOCYTES # BLD AUTO: 0.6 K/UL — SIGNIFICANT CHANGE UP (ref 0–0.9)
MONOCYTES NFR BLD AUTO: 8.9 % — SIGNIFICANT CHANGE UP (ref 1–9)
NEUTROPHILS # BLD AUTO: 4.2 K/UL — SIGNIFICANT CHANGE UP (ref 1.8–7.4)
NEUTROPHILS NFR BLD AUTO: 65.3 % — SIGNIFICANT CHANGE UP (ref 43–77)
PLATELET # BLD AUTO: 188 K/UL — SIGNIFICANT CHANGE UP (ref 150–400)
POTASSIUM SERPL-MCNC: 4.5 MMOL/L — SIGNIFICANT CHANGE UP (ref 3.5–5.3)
POTASSIUM SERPL-SCNC: 4.5 MMOL/L — SIGNIFICANT CHANGE UP (ref 3.5–5.3)
PREALB SERPL-MCNC: 20.3 MG/DL — SIGNIFICANT CHANGE UP (ref 20–40)
PROT SERPL-MCNC: 7.5 G/DL — SIGNIFICANT CHANGE UP (ref 6–8.3)
RBC # BLD: 4.91 M/UL — SIGNIFICANT CHANGE UP (ref 4.2–5.8)
RBC # FLD: 12.3 % — SIGNIFICANT CHANGE UP (ref 10.3–14.5)
SODIUM SERPL-SCNC: 144 MMOL/L — SIGNIFICANT CHANGE UP (ref 135–145)
WBC # BLD: 6.4 K/UL — SIGNIFICANT CHANGE UP (ref 3.8–10.5)
WBC # FLD AUTO: 6.4 K/UL — SIGNIFICANT CHANGE UP (ref 3.8–10.5)

## 2018-05-04 PROCEDURE — 84134 ASSAY OF PREALBUMIN: CPT

## 2018-05-04 PROCEDURE — 85652 RBC SED RATE AUTOMATED: CPT

## 2018-05-04 PROCEDURE — 86140 C-REACTIVE PROTEIN: CPT

## 2018-05-04 PROCEDURE — 83036 HEMOGLOBIN GLYCOSYLATED A1C: CPT

## 2018-05-04 PROCEDURE — 80053 COMPREHEN METABOLIC PANEL: CPT

## 2018-05-04 PROCEDURE — 97602 WOUND(S) CARE NON-SELECTIVE: CPT

## 2018-05-04 PROCEDURE — 85027 COMPLETE CBC AUTOMATED: CPT

## 2018-05-05 DIAGNOSIS — L84 CORNS AND CALLOSITIES: ICD-10-CM

## 2018-05-05 DIAGNOSIS — Z79.899 OTHER LONG TERM (CURRENT) DRUG THERAPY: ICD-10-CM

## 2018-05-05 DIAGNOSIS — E66.3 OVERWEIGHT: ICD-10-CM

## 2018-05-05 DIAGNOSIS — Z80.8 FAMILY HISTORY OF MALIGNANT NEOPLASM OF OTHER ORGANS OR SYSTEMS: ICD-10-CM

## 2018-05-05 DIAGNOSIS — R32 UNSPECIFIED URINARY INCONTINENCE: ICD-10-CM

## 2018-05-05 DIAGNOSIS — R15.9 FULL INCONTINENCE OF FECES: ICD-10-CM

## 2018-05-05 DIAGNOSIS — L89.893 PRESSURE ULCER OF OTHER SITE, STAGE 3: ICD-10-CM

## 2018-05-05 DIAGNOSIS — R47.02 DYSPHASIA: ICD-10-CM

## 2018-05-05 DIAGNOSIS — L89.623 PRESSURE ULCER OF LEFT HEEL, STAGE 3: ICD-10-CM

## 2018-05-05 DIAGNOSIS — Z82.49 FAMILY HISTORY OF ISCHEMIC HEART DISEASE AND OTHER DISEASES OF THE CIRCULATORY SYSTEM: ICD-10-CM

## 2018-05-12 ENCOUNTER — MOBILE ON CALL (OUTPATIENT)
Age: 58
End: 2018-05-12

## 2018-05-16 ENCOUNTER — APPOINTMENT (OUTPATIENT)
Dept: MRI IMAGING | Facility: CLINIC | Age: 58
End: 2018-05-16
Payer: MEDICARE

## 2018-05-16 ENCOUNTER — OUTPATIENT (OUTPATIENT)
Dept: OUTPATIENT SERVICES | Facility: HOSPITAL | Age: 58
LOS: 1 days | End: 2018-05-16

## 2018-05-16 DIAGNOSIS — Z00.8 ENCOUNTER FOR OTHER GENERAL EXAMINATION: ICD-10-CM

## 2018-05-16 PROCEDURE — 73720 MRI LWR EXTREMITY W/O&W/DYE: CPT | Mod: 26,RT

## 2018-05-22 ENCOUNTER — APPOINTMENT (OUTPATIENT)
Dept: INTERNAL MEDICINE | Facility: CLINIC | Age: 58
End: 2018-05-22
Payer: MEDICARE

## 2018-05-22 VITALS
SYSTOLIC BLOOD PRESSURE: 110 MMHG | HEART RATE: 78 BPM | HEIGHT: 65 IN | WEIGHT: 146 LBS | DIASTOLIC BLOOD PRESSURE: 76 MMHG | RESPIRATION RATE: 12 BRPM | BODY MASS INDEX: 24.32 KG/M2

## 2018-05-22 PROCEDURE — 99214 OFFICE O/P EST MOD 30 MIN: CPT

## 2018-05-22 NOTE — ASSESSMENT
[FreeTextEntry1] : follow up podiatry heel ulcer\par patient has dementia now progressive\par is nonverbal but has good appetite\par has services at home

## 2018-05-22 NOTE — HISTORY OF PRESENT ILLNESS
[FreeTextEntry1] : patient here for follow up with sister and brother [de-identified] : he has neurocognitive impairment\par is now non verbal but is still eating\par he has services at home and lives with mom\par he has stopped talking as of 6 months ago

## 2018-05-22 NOTE — PHYSICAL EXAM

## 2018-05-22 NOTE — REVIEW OF SYSTEMS
[Confusion] : confusion [Unsteady Walk] : ataxia [Memory Loss] : memory loss [Negative] : Heme/Lymph

## 2018-05-24 ENCOUNTER — OUTPATIENT (OUTPATIENT)
Dept: OUTPATIENT SERVICES | Facility: HOSPITAL | Age: 58
LOS: 1 days | Discharge: HOME | End: 2018-05-24
Payer: MEDICARE

## 2018-05-24 DIAGNOSIS — L89.893 PRESSURE ULCER OF OTHER SITE, STAGE 3: ICD-10-CM

## 2018-05-24 PROCEDURE — G0463: CPT

## 2018-05-26 DIAGNOSIS — R15.9 FULL INCONTINENCE OF FECES: ICD-10-CM

## 2018-05-26 DIAGNOSIS — Z79.899 OTHER LONG TERM (CURRENT) DRUG THERAPY: ICD-10-CM

## 2018-05-26 DIAGNOSIS — L84 CORNS AND CALLOSITIES: ICD-10-CM

## 2018-05-26 DIAGNOSIS — L89.623 PRESSURE ULCER OF LEFT HEEL, STAGE 3: ICD-10-CM

## 2018-05-26 DIAGNOSIS — R32 UNSPECIFIED URINARY INCONTINENCE: ICD-10-CM

## 2018-05-26 DIAGNOSIS — E66.3 OVERWEIGHT: ICD-10-CM

## 2018-05-26 DIAGNOSIS — R47.02 DYSPHASIA: ICD-10-CM

## 2018-05-26 DIAGNOSIS — Z80.8 FAMILY HISTORY OF MALIGNANT NEOPLASM OF OTHER ORGANS OR SYSTEMS: ICD-10-CM

## 2018-05-26 DIAGNOSIS — L89.893 PRESSURE ULCER OF OTHER SITE, STAGE 3: ICD-10-CM

## 2018-05-26 DIAGNOSIS — Z82.49 FAMILY HISTORY OF ISCHEMIC HEART DISEASE AND OTHER DISEASES OF THE CIRCULATORY SYSTEM: ICD-10-CM

## 2018-06-22 ENCOUNTER — APPOINTMENT (OUTPATIENT)
Dept: INTERNAL MEDICINE | Facility: CLINIC | Age: 58
End: 2018-06-22
Payer: MEDICARE

## 2018-06-22 VITALS — BODY MASS INDEX: 24.32 KG/M2 | HEIGHT: 65 IN | WEIGHT: 146 LBS

## 2018-06-22 VITALS — RESPIRATION RATE: 12 BRPM | SYSTOLIC BLOOD PRESSURE: 108 MMHG | HEART RATE: 12 BPM | DIASTOLIC BLOOD PRESSURE: 76 MMHG

## 2018-06-22 DIAGNOSIS — T78.40XA ALLERGY, UNSPECIFIED, INITIAL ENCOUNTER: ICD-10-CM

## 2018-06-22 PROCEDURE — 99213 OFFICE O/P EST LOW 20 MIN: CPT

## 2018-06-22 NOTE — HISTORY OF PRESENT ILLNESS
[FreeTextEntry1] : allergic reaciton [de-identified] : has rash on his skin\par not sure from what\par only new things are diapers\par no new powders deodorants and cream\par patient is nonverbal

## 2018-06-22 NOTE — PHYSICAL EXAM
[No JVD] : no jugular venous distention [No Respiratory Distress] : no respiratory distress  [Normal Rate] : normal rate  [de-identified] : rash skin and body

## 2018-07-17 PROBLEM — I63.9 CEREBRAL INFARCTION, UNSPECIFIED: Chronic | Status: INACTIVE | Noted: 2018-02-19 | Resolved: 2018-02-26

## 2018-07-17 PROBLEM — R47.01 APHASIA: Chronic | Status: INACTIVE | Noted: 2018-02-19 | Resolved: 2018-02-26

## 2018-07-18 ENCOUNTER — RX RENEWAL (OUTPATIENT)
Age: 58
End: 2018-07-18

## 2018-09-25 ENCOUNTER — APPOINTMENT (OUTPATIENT)
Dept: INTERNAL MEDICINE | Facility: CLINIC | Age: 58
End: 2018-09-25
Payer: MEDICARE

## 2018-09-25 VITALS — BODY MASS INDEX: 24.46 KG/M2 | WEIGHT: 147 LBS

## 2018-09-25 VITALS — HEART RATE: 80 BPM | DIASTOLIC BLOOD PRESSURE: 78 MMHG | RESPIRATION RATE: 15 BRPM | SYSTOLIC BLOOD PRESSURE: 118 MMHG

## 2018-09-25 PROBLEM — R47.01 APHASIA: Chronic | Status: ACTIVE | Noted: 2018-02-26

## 2018-09-25 PROBLEM — I63.9 CEREBRAL INFARCTION, UNSPECIFIED: Chronic | Status: ACTIVE | Noted: 2018-02-26

## 2018-09-25 PROCEDURE — 36415 COLL VENOUS BLD VENIPUNCTURE: CPT

## 2018-09-25 PROCEDURE — 99214 OFFICE O/P EST MOD 30 MIN: CPT | Mod: 25

## 2018-09-25 NOTE — PHYSICAL EXAM

## 2018-09-25 NOTE — ASSESSMENT
[FreeTextEntry1] : check labs\par appears stable\par no changes to medication\par received flu vaccine yesterday at home

## 2018-09-26 ENCOUNTER — APPOINTMENT (OUTPATIENT)
Dept: INTERNAL MEDICINE | Facility: CLINIC | Age: 58
End: 2018-09-26
Payer: MEDICARE

## 2018-09-26 ENCOUNTER — MEDICATION RENEWAL (OUTPATIENT)
Age: 58
End: 2018-09-26

## 2018-09-26 LAB
ALBUMIN SERPL ELPH-MCNC: 4.5 G/DL
ALP BLD-CCNC: 80 U/L
ALT SERPL-CCNC: 19 U/L
ANION GAP SERPL CALC-SCNC: 14 MMOL/L
AST SERPL-CCNC: 16 U/L
BASOPHILS # BLD AUTO: 0.03 K/UL
BASOPHILS NFR BLD AUTO: 0.3 %
BILIRUB SERPL-MCNC: 0.2 MG/DL
BUN SERPL-MCNC: 20 MG/DL
CALCIUM SERPL-MCNC: 9.9 MG/DL
CHLORIDE SERPL-SCNC: 101 MMOL/L
CHOLEST SERPL-MCNC: 169 MG/DL
CHOLEST/HDLC SERPL: 3.3 RATIO
CO2 SERPL-SCNC: 28 MMOL/L
CREAT SERPL-MCNC: 1.01 MG/DL
EOSINOPHIL # BLD AUTO: 0.61 K/UL
EOSINOPHIL NFR BLD AUTO: 6 %
GLUCOSE SERPL-MCNC: 121 MG/DL
HBA1C MFR BLD HPLC: 5.2 %
HCT VFR BLD CALC: 48 %
HDLC SERPL-MCNC: 51 MG/DL
HGB BLD-MCNC: 14.6 G/DL
IMM GRANULOCYTES NFR BLD AUTO: 0.1 %
LDLC SERPL CALC-MCNC: 102 MG/DL
LYMPHOCYTES # BLD AUTO: 1.23 K/UL
LYMPHOCYTES NFR BLD AUTO: 12 %
MAN DIFF?: NORMAL
MCHC RBC-ENTMCNC: 29.4 PG
MCHC RBC-ENTMCNC: 30.4 GM/DL
MCV RBC AUTO: 96.8 FL
MONOCYTES # BLD AUTO: 0.7 K/UL
MONOCYTES NFR BLD AUTO: 6.9 %
NEUTROPHILS # BLD AUTO: 7.63 K/UL
NEUTROPHILS NFR BLD AUTO: 74.7 %
PLATELET # BLD AUTO: 223 K/UL
POTASSIUM SERPL-SCNC: 4.4 MMOL/L
PROT SERPL-MCNC: 7.9 G/DL
PSA SERPL-MCNC: 0.54 NG/ML
RBC # BLD: 4.96 M/UL
RBC # FLD: 13.6 %
SODIUM SERPL-SCNC: 143 MMOL/L
T4 FREE SERPL-MCNC: 1.3 NG/DL
TRIGL SERPL-MCNC: 78 MG/DL
TSH SERPL-ACNC: 2.06 UIU/ML
WBC # FLD AUTO: 10.21 K/UL

## 2018-09-26 PROCEDURE — 86580 TB INTRADERMAL TEST: CPT

## 2018-11-24 ENCOUNTER — RX RENEWAL (OUTPATIENT)
Age: 58
End: 2018-11-24

## 2018-12-22 ENCOUNTER — RX RENEWAL (OUTPATIENT)
Age: 58
End: 2018-12-22

## 2019-01-24 ENCOUNTER — APPOINTMENT (OUTPATIENT)
Dept: INTERNAL MEDICINE | Facility: CLINIC | Age: 59
End: 2019-01-24
Payer: MEDICARE

## 2019-01-24 VITALS — HEIGHT: 65 IN | WEIGHT: 164 LBS | BODY MASS INDEX: 27.32 KG/M2

## 2019-01-24 VITALS — HEART RATE: 76 BPM | DIASTOLIC BLOOD PRESSURE: 78 MMHG | SYSTOLIC BLOOD PRESSURE: 120 MMHG | RESPIRATION RATE: 12 BRPM

## 2019-01-24 DIAGNOSIS — R32 UNSPECIFIED URINARY INCONTINENCE: ICD-10-CM

## 2019-01-24 PROCEDURE — 99214 OFFICE O/P EST MOD 30 MIN: CPT

## 2019-01-24 NOTE — PHYSICAL EXAM

## 2019-01-24 NOTE — HISTORY OF PRESENT ILLNESS
[FreeTextEntry1] : for follow up [de-identified] : for follow up \par htn, hld, cognitive impairment\par patient is now nonverbal \par he is stable, he eats well, but is incontinent fo urine and stool\par he has frequent loose bowel movements\par but no fever no sob no nvd or palpitations.

## 2019-02-14 ENCOUNTER — RX RENEWAL (OUTPATIENT)
Age: 59
End: 2019-02-14

## 2019-02-26 ENCOUNTER — RX RENEWAL (OUTPATIENT)
Age: 59
End: 2019-02-26

## 2019-03-04 ENCOUNTER — NON-APPOINTMENT (OUTPATIENT)
Age: 59
End: 2019-03-04

## 2019-03-04 ENCOUNTER — APPOINTMENT (OUTPATIENT)
Dept: INTERNAL MEDICINE | Facility: CLINIC | Age: 59
End: 2019-03-04
Payer: MEDICARE

## 2019-03-04 VITALS — DIASTOLIC BLOOD PRESSURE: 64 MMHG | RESPIRATION RATE: 12 BRPM | SYSTOLIC BLOOD PRESSURE: 104 MMHG | HEART RATE: 70 BPM

## 2019-03-04 VITALS — BODY MASS INDEX: 27.32 KG/M2 | WEIGHT: 164 LBS | HEIGHT: 65 IN

## 2019-03-04 PROCEDURE — G0439: CPT

## 2019-03-04 PROCEDURE — 36415 COLL VENOUS BLD VENIPUNCTURE: CPT

## 2019-03-04 PROCEDURE — 86580 TB INTRADERMAL TEST: CPT

## 2019-03-04 PROCEDURE — 93000 ELECTROCARDIOGRAM COMPLETE: CPT

## 2019-03-04 NOTE — ASSESSMENT
[FreeTextEntry1] : ppd done\par medically stable\par going to \par up to date with vaccines\par cognition remains same\par stool for blood ordered

## 2019-03-04 NOTE — HEALTH RISK ASSESSMENT
[Good] : ~his/her~ current health as good [] : No [0] : 2) Feeling down, depressed, or hopeless: Not at all (0) [HIV test declined] : HIV test declined [Change in mental status noted] : No change in mental status noted [Language] : denies difficulty with language [Behavior] : denies difficulty with behavior [Learning/Retaining New Information] : denies difficulty learning/retaining new information [Handling Complex Tasks] : denies difficulty handling complex tasks [Reasoning] : denies difficulty with reasoning [Spatial Ability and Orientation] : denies difficulty with spatial ability and orientation [None] : None [With Family] : lives with family [On disability] : on disability [College] : College [] :  [Sexually Active] : not sexually active [High Risk Behavior] : no high risk behavior [Feels Safe at Home] : Feels safe at home [Reports changes in hearing] : Reports no changes in hearing [Reports changes in vision] : Reports no changes in vision [Reports normal functional visual acuity (ie: able to read med bottle)] : Reports poor functional visual acuity.  [Reports changes in dental health] : Reports no changes in dental health [Smoke Detector] : smoke detector [Carbon Monoxide Detector] : no carbon monoxide detector [Guns at Home] : no guns at home [Safety elements used in home] : safety elements used in home [Seat Belt] :  uses seat belt [Sunscreen] : does not use sunscreen [Travel to Developing Areas] : does not  travel to developing areas [ColonoscopyDate] : due [de-identified] : needs assistance [de-identified] : needs assistance [Patient/Caregiver unclear of wishes] : Patient/Caregiver unclear of wishes [AdvancecareDate] : 3/4/19

## 2019-03-04 NOTE — HISTORY OF PRESENT ILLNESS
[Family Member] : family member [FreeTextEntry1] : For CPE\par needs ppd for program [de-identified] : For CPE\par has been at baseline cognition\par non verbal\par he has no symptoms according to the brother

## 2019-03-05 ENCOUNTER — CHART COPY (OUTPATIENT)
Age: 59
End: 2019-03-05

## 2019-03-05 LAB
25(OH)D3 SERPL-MCNC: 29.9 NG/ML
ALBUMIN SERPL ELPH-MCNC: 4 G/DL
ALP BLD-CCNC: 87 U/L
ALT SERPL-CCNC: 25 U/L
ANION GAP SERPL CALC-SCNC: 12 MMOL/L
AST SERPL-CCNC: 16 U/L
BASOPHILS # BLD AUTO: 0.03 K/UL
BASOPHILS NFR BLD AUTO: 0.4 %
BILIRUB SERPL-MCNC: 0.3 MG/DL
BUN SERPL-MCNC: 16 MG/DL
CALCIUM SERPL-MCNC: 9.4 MG/DL
CHLORIDE SERPL-SCNC: 107 MMOL/L
CHOLEST SERPL-MCNC: 149 MG/DL
CHOLEST/HDLC SERPL: 3.9 RATIO
CO2 SERPL-SCNC: 27 MMOL/L
CREAT SERPL-MCNC: 1.29 MG/DL
EOSINOPHIL # BLD AUTO: 0.17 K/UL
EOSINOPHIL NFR BLD AUTO: 2.1 %
GLUCOSE SERPL-MCNC: 91 MG/DL
HBA1C MFR BLD HPLC: 5.2 %
HCT VFR BLD CALC: 46.1 %
HDLC SERPL-MCNC: 38 MG/DL
HGB BLD-MCNC: 14.5 G/DL
IMM GRANULOCYTES NFR BLD AUTO: 0.2 %
LDLC SERPL CALC-MCNC: 91 MG/DL
LYMPHOCYTES # BLD AUTO: 1.54 K/UL
LYMPHOCYTES NFR BLD AUTO: 18.9 %
MAN DIFF?: NORMAL
MCHC RBC-ENTMCNC: 29.7 PG
MCHC RBC-ENTMCNC: 31.5 GM/DL
MCV RBC AUTO: 94.5 FL
MONOCYTES # BLD AUTO: 0.81 K/UL
MONOCYTES NFR BLD AUTO: 10 %
NEUTROPHILS # BLD AUTO: 5.57 K/UL
NEUTROPHILS NFR BLD AUTO: 68.4 %
PLATELET # BLD AUTO: 236 K/UL
POTASSIUM SERPL-SCNC: 4.4 MMOL/L
PROT SERPL-MCNC: 7.1 G/DL
RBC # BLD: 4.88 M/UL
RBC # FLD: 12.6 %
SODIUM SERPL-SCNC: 146 MMOL/L
T4 FREE SERPL-MCNC: 1.4 NG/DL
TRIGL SERPL-MCNC: 99 MG/DL
TSH SERPL-ACNC: 2.03 UIU/ML
WBC # FLD AUTO: 8.14 K/UL

## 2019-03-25 ENCOUNTER — RX RENEWAL (OUTPATIENT)
Age: 59
End: 2019-03-25

## 2019-03-25 ENCOUNTER — MEDICATION RENEWAL (OUTPATIENT)
Age: 59
End: 2019-03-25

## 2019-04-05 ENCOUNTER — APPOINTMENT (OUTPATIENT)
Dept: INTERNAL MEDICINE | Facility: CLINIC | Age: 59
End: 2019-04-05
Payer: MEDICARE

## 2019-04-05 VITALS — HEIGHT: 65 IN | WEIGHT: 164 LBS | BODY MASS INDEX: 27.32 KG/M2

## 2019-04-05 VITALS — HEART RATE: 70 BPM | DIASTOLIC BLOOD PRESSURE: 82 MMHG | SYSTOLIC BLOOD PRESSURE: 140 MMHG | RESPIRATION RATE: 12 BRPM

## 2019-04-05 DIAGNOSIS — Z11.1 ENCOUNTER FOR SCREENING FOR RESPIRATORY TUBERCULOSIS: ICD-10-CM

## 2019-04-05 DIAGNOSIS — Z23 ENCOUNTER FOR IMMUNIZATION: ICD-10-CM

## 2019-04-05 PROCEDURE — 90471 IMMUNIZATION ADMIN: CPT | Mod: GY

## 2019-04-05 PROCEDURE — 86580 TB INTRADERMAL TEST: CPT

## 2019-04-05 PROCEDURE — 99214 OFFICE O/P EST MOD 30 MIN: CPT | Mod: 25

## 2019-04-05 PROCEDURE — 90715 TDAP VACCINE 7 YRS/> IM: CPT | Mod: GY

## 2019-04-05 PROCEDURE — 36415 COLL VENOUS BLD VENIPUNCTURE: CPT

## 2019-04-05 NOTE — HISTORY OF PRESENT ILLNESS
[FreeTextEntry1] : for follow up [de-identified] : for follow up \par needs lab test and second ppd for program\par patient has no new issues\par has severe cognitive impairmetn\par needs tdap as well

## 2019-04-06 LAB
HBV SURFACE AB SER QL: NONREACTIVE
MEV IGG FLD QL IA: >300 AU/ML
MEV IGG+IGM SER-IMP: POSITIVE
MUV AB SER-ACNC: POSITIVE
MUV IGG SER QL IA: 12.7 AU/ML
RUBV IGG FLD-ACNC: 2.1 INDEX
RUBV IGG SER-IMP: POSITIVE
T PALLIDUM AB SER QL IA: NEGATIVE
VZV AB TITR SER: POSITIVE
VZV IGG SER IF-ACNC: 456.8 INDEX

## 2019-05-15 ENCOUNTER — RX RENEWAL (OUTPATIENT)
Age: 59
End: 2019-05-15

## 2019-08-15 ENCOUNTER — RECORD ABSTRACTING (OUTPATIENT)
Age: 59
End: 2019-08-15

## 2019-08-16 ENCOUNTER — INPATIENT (INPATIENT)
Facility: HOSPITAL | Age: 59
LOS: 6 days | Discharge: HOME | DRG: 391 | End: 2019-08-23
Attending: INTERNAL MEDICINE | Admitting: HOSPITALIST
Payer: MEDICARE

## 2019-08-16 VITALS
HEART RATE: 57 BPM | OXYGEN SATURATION: 100 % | WEIGHT: 169.98 LBS | SYSTOLIC BLOOD PRESSURE: 106 MMHG | RESPIRATION RATE: 20 BRPM | HEIGHT: 67 IN | DIASTOLIC BLOOD PRESSURE: 69 MMHG

## 2019-08-16 DIAGNOSIS — R13.10 DYSPHAGIA, UNSPECIFIED: ICD-10-CM

## 2019-08-16 DIAGNOSIS — Z98.890 OTHER SPECIFIED POSTPROCEDURAL STATES: Chronic | ICD-10-CM

## 2019-08-16 DIAGNOSIS — Z90.79 ACQUIRED ABSENCE OF OTHER GENITAL ORGAN(S): Chronic | ICD-10-CM

## 2019-08-16 LAB
ALBUMIN SERPL ELPH-MCNC: 4.2 G/DL — SIGNIFICANT CHANGE UP (ref 3.3–5.2)
ALP SERPL-CCNC: 88 U/L — SIGNIFICANT CHANGE UP (ref 40–120)
ALT FLD-CCNC: 17 U/L — SIGNIFICANT CHANGE UP
ANION GAP SERPL CALC-SCNC: 11 MMOL/L — SIGNIFICANT CHANGE UP (ref 5–17)
APPEARANCE UR: CLEAR — SIGNIFICANT CHANGE UP
AST SERPL-CCNC: 16 U/L — SIGNIFICANT CHANGE UP
BASOPHILS # BLD AUTO: 0.03 K/UL — SIGNIFICANT CHANGE UP (ref 0–0.2)
BASOPHILS NFR BLD AUTO: 0.5 % — SIGNIFICANT CHANGE UP (ref 0–2)
BILIRUB SERPL-MCNC: 0.3 MG/DL — LOW (ref 0.4–2)
BILIRUB UR-MCNC: NEGATIVE — SIGNIFICANT CHANGE UP
BUN SERPL-MCNC: 16 MG/DL — SIGNIFICANT CHANGE UP (ref 8–20)
CALCIUM SERPL-MCNC: 9.7 MG/DL — SIGNIFICANT CHANGE UP (ref 8.6–10.2)
CHLORIDE SERPL-SCNC: 104 MMOL/L — SIGNIFICANT CHANGE UP (ref 98–107)
CO2 SERPL-SCNC: 29 MMOL/L — SIGNIFICANT CHANGE UP (ref 22–29)
COLOR SPEC: YELLOW — SIGNIFICANT CHANGE UP
CREAT SERPL-MCNC: 1.03 MG/DL — SIGNIFICANT CHANGE UP (ref 0.5–1.3)
DIFF PNL FLD: ABNORMAL
EOSINOPHIL # BLD AUTO: 0.31 K/UL — SIGNIFICANT CHANGE UP (ref 0–0.5)
EOSINOPHIL NFR BLD AUTO: 4.9 % — SIGNIFICANT CHANGE UP (ref 0–6)
GLUCOSE SERPL-MCNC: 88 MG/DL — SIGNIFICANT CHANGE UP (ref 70–115)
GLUCOSE UR QL: NEGATIVE MG/DL — SIGNIFICANT CHANGE UP
HCT VFR BLD CALC: 46.6 % — SIGNIFICANT CHANGE UP (ref 39–50)
HGB BLD-MCNC: 15.1 G/DL — SIGNIFICANT CHANGE UP (ref 13–17)
IMM GRANULOCYTES NFR BLD AUTO: 0.2 % — SIGNIFICANT CHANGE UP (ref 0–1.5)
KETONES UR-MCNC: NEGATIVE — SIGNIFICANT CHANGE UP
LACTATE BLDV-MCNC: 0.6 MMOL/L — SIGNIFICANT CHANGE UP (ref 0.5–2)
LEUKOCYTE ESTERASE UR-ACNC: NEGATIVE — SIGNIFICANT CHANGE UP
LYMPHOCYTES # BLD AUTO: 1.26 K/UL — SIGNIFICANT CHANGE UP (ref 1–3.3)
LYMPHOCYTES # BLD AUTO: 19.8 % — SIGNIFICANT CHANGE UP (ref 13–44)
MCHC RBC-ENTMCNC: 29.8 PG — SIGNIFICANT CHANGE UP (ref 27–34)
MCHC RBC-ENTMCNC: 32.4 GM/DL — SIGNIFICANT CHANGE UP (ref 32–36)
MCV RBC AUTO: 91.9 FL — SIGNIFICANT CHANGE UP (ref 80–100)
MONOCYTES # BLD AUTO: 0.51 K/UL — SIGNIFICANT CHANGE UP (ref 0–0.9)
MONOCYTES NFR BLD AUTO: 8 % — SIGNIFICANT CHANGE UP (ref 2–14)
NEUTROPHILS # BLD AUTO: 4.23 K/UL — SIGNIFICANT CHANGE UP (ref 1.8–7.4)
NEUTROPHILS NFR BLD AUTO: 66.6 % — SIGNIFICANT CHANGE UP (ref 43–77)
NITRITE UR-MCNC: NEGATIVE — SIGNIFICANT CHANGE UP
PH UR: 7 — SIGNIFICANT CHANGE UP (ref 5–8)
PLATELET # BLD AUTO: 225 K/UL — SIGNIFICANT CHANGE UP (ref 150–400)
POTASSIUM SERPL-MCNC: 4.5 MMOL/L — SIGNIFICANT CHANGE UP (ref 3.5–5.3)
POTASSIUM SERPL-SCNC: 4.5 MMOL/L — SIGNIFICANT CHANGE UP (ref 3.5–5.3)
PROT SERPL-MCNC: 8.1 G/DL — SIGNIFICANT CHANGE UP (ref 6.6–8.7)
PROT UR-MCNC: NEGATIVE MG/DL — SIGNIFICANT CHANGE UP
RBC # BLD: 5.07 M/UL — SIGNIFICANT CHANGE UP (ref 4.2–5.8)
RBC # FLD: 11.9 % — SIGNIFICANT CHANGE UP (ref 10.3–14.5)
RBC CASTS # UR COMP ASSIST: SIGNIFICANT CHANGE UP /HPF (ref 0–4)
SODIUM SERPL-SCNC: 144 MMOL/L — SIGNIFICANT CHANGE UP (ref 135–145)
SP GR SPEC: 1 — LOW (ref 1.01–1.02)
UROBILINOGEN FLD QL: NEGATIVE MG/DL — SIGNIFICANT CHANGE UP
WBC # BLD: 6.35 K/UL — SIGNIFICANT CHANGE UP (ref 3.8–10.5)
WBC # FLD AUTO: 6.35 K/UL — SIGNIFICANT CHANGE UP (ref 3.8–10.5)
WBC UR QL: SIGNIFICANT CHANGE UP

## 2019-08-16 PROCEDURE — 71045 X-RAY EXAM CHEST 1 VIEW: CPT | Mod: 26

## 2019-08-16 PROCEDURE — 93010 ELECTROCARDIOGRAM REPORT: CPT

## 2019-08-16 PROCEDURE — 70491 CT SOFT TISSUE NECK W/DYE: CPT | Mod: 26

## 2019-08-16 PROCEDURE — 99285 EMERGENCY DEPT VISIT HI MDM: CPT

## 2019-08-16 PROCEDURE — 71260 CT THORAX DX C+: CPT | Mod: 26

## 2019-08-16 PROCEDURE — 99223 1ST HOSP IP/OBS HIGH 75: CPT | Mod: GC,AI

## 2019-08-16 PROCEDURE — 74177 CT ABD & PELVIS W/CONTRAST: CPT | Mod: 26

## 2019-08-16 PROCEDURE — 70450 CT HEAD/BRAIN W/O DYE: CPT | Mod: 26

## 2019-08-16 RX ORDER — SODIUM CHLORIDE 9 MG/ML
1000 INJECTION INTRAMUSCULAR; INTRAVENOUS; SUBCUTANEOUS ONCE
Refills: 0 | Status: COMPLETED | OUTPATIENT
Start: 2019-08-16 | End: 2019-08-16

## 2019-08-16 RX ORDER — ACETAMINOPHEN 500 MG
650 TABLET ORAL EVERY 6 HOURS
Refills: 0 | Status: DISCONTINUED | OUTPATIENT
Start: 2019-08-16 | End: 2019-08-23

## 2019-08-16 RX ORDER — MIDAZOLAM HYDROCHLORIDE 1 MG/ML
3 INJECTION, SOLUTION INTRAMUSCULAR; INTRAVENOUS ONCE
Refills: 0 | Status: DISCONTINUED | OUTPATIENT
Start: 2019-08-16 | End: 2019-08-16

## 2019-08-16 RX ORDER — MINERAL OIL
133 OIL (ML) MISCELLANEOUS ONCE
Refills: 0 | Status: COMPLETED | OUTPATIENT
Start: 2019-08-16 | End: 2019-08-16

## 2019-08-16 RX ORDER — HEPARIN SODIUM 5000 [USP'U]/ML
5000 INJECTION INTRAVENOUS; SUBCUTANEOUS EVERY 12 HOURS
Refills: 0 | Status: DISCONTINUED | OUTPATIENT
Start: 2019-08-16 | End: 2019-08-23

## 2019-08-16 RX ORDER — CIPROFLOXACIN LACTATE 400MG/40ML
400 VIAL (ML) INTRAVENOUS EVERY 12 HOURS
Refills: 0 | Status: DISCONTINUED | OUTPATIENT
Start: 2019-08-16 | End: 2019-08-16

## 2019-08-16 RX ORDER — SODIUM CHLORIDE 9 MG/ML
1000 INJECTION, SOLUTION INTRAVENOUS
Refills: 0 | Status: DISCONTINUED | OUTPATIENT
Start: 2019-08-16 | End: 2019-08-17

## 2019-08-16 RX ORDER — ONDANSETRON 8 MG/1
4 TABLET, FILM COATED ORAL ONCE
Refills: 0 | Status: COMPLETED | OUTPATIENT
Start: 2019-08-16 | End: 2019-08-16

## 2019-08-16 RX ORDER — ONDANSETRON 8 MG/1
4 TABLET, FILM COATED ORAL EVERY 6 HOURS
Refills: 0 | Status: DISCONTINUED | OUTPATIENT
Start: 2019-08-16 | End: 2019-08-23

## 2019-08-16 RX ORDER — ONDANSETRON 8 MG/1
4 TABLET, FILM COATED ORAL EVERY 6 HOURS
Refills: 0 | Status: DISCONTINUED | OUTPATIENT
Start: 2019-08-16 | End: 2019-08-16

## 2019-08-16 RX ORDER — FAMOTIDINE 10 MG/ML
20 INJECTION INTRAVENOUS ONCE
Refills: 0 | Status: COMPLETED | OUTPATIENT
Start: 2019-08-16 | End: 2019-08-16

## 2019-08-16 RX ORDER — MIDAZOLAM HYDROCHLORIDE 1 MG/ML
2 INJECTION, SOLUTION INTRAMUSCULAR; INTRAVENOUS ONCE
Refills: 0 | Status: DISCONTINUED | OUTPATIENT
Start: 2019-08-16 | End: 2019-08-16

## 2019-08-16 RX ORDER — METRONIDAZOLE 500 MG
500 TABLET ORAL EVERY 8 HOURS
Refills: 0 | Status: DISCONTINUED | OUTPATIENT
Start: 2019-08-16 | End: 2019-08-16

## 2019-08-16 RX ADMIN — ONDANSETRON 4 MILLIGRAM(S): 8 TABLET, FILM COATED ORAL at 13:21

## 2019-08-16 RX ADMIN — SODIUM CHLORIDE 1000 MILLILITER(S): 9 INJECTION INTRAMUSCULAR; INTRAVENOUS; SUBCUTANEOUS at 15:31

## 2019-08-16 RX ADMIN — FAMOTIDINE 20 MILLIGRAM(S): 10 INJECTION INTRAVENOUS at 13:21

## 2019-08-16 RX ADMIN — Medication 133 MILLILITER(S): at 19:05

## 2019-08-16 RX ADMIN — SODIUM CHLORIDE 1000 MILLILITER(S): 9 INJECTION INTRAMUSCULAR; INTRAVENOUS; SUBCUTANEOUS at 13:21

## 2019-08-16 RX ADMIN — Medication 2 MILLIGRAM(S): at 15:31

## 2019-08-16 RX ADMIN — Medication 2 MILLIGRAM(S): at 13:00

## 2019-08-16 RX ADMIN — MIDAZOLAM HYDROCHLORIDE 3 MILLIGRAM(S): 1 INJECTION, SOLUTION INTRAMUSCULAR; INTRAVENOUS at 16:13

## 2019-08-16 RX ADMIN — MIDAZOLAM HYDROCHLORIDE 2 MILLIGRAM(S): 1 INJECTION, SOLUTION INTRAMUSCULAR; INTRAVENOUS at 16:04

## 2019-08-16 RX ADMIN — SODIUM CHLORIDE 100 MILLILITER(S): 9 INJECTION, SOLUTION INTRAVENOUS at 22:00

## 2019-08-16 NOTE — ED PROVIDER NOTE - PROGRESS NOTE DETAILS
pt has medical necessity for iv contrast for ct scan, noral kidney fucntion, also spoke ot brother earlier who is not here now who agreed with plan pt has medical necessity for iv contrast for ct scan, normal kidney function, also spoke ot brother earlier who is not here now who agreed with plan pt earlier had difficulty in iv access due to agitation. pt also constantly masturbates, given ativan for sedation and cooperation

## 2019-08-16 NOTE — ED ADULT TRIAGE NOTE - CHIEF COMPLAINT QUOTE
c/o difficulty swallowing x 3 days c/o difficulty swallowing x 3 days, only able to tolerate liquids

## 2019-08-16 NOTE — ED STATDOCS - CLINICAL SUMMARY MEDICAL DECISION MAKING FREE TEXT BOX
I, Annette Crawford, performed the initial face to face bedside interview with this patient regarding history of present illness and determined that the patient should be seen in the main ED.  The history, was documented by the scribe in my presence and I attest to the accuracy of the documentation.

## 2019-08-16 NOTE — ED PROVIDER NOTE - OBJECTIVE STATEMENT
58 y/o M with vascular dementia presents to ED for decreased PO intake 3 days. History is limited, pt is nonverbal for 2 years, urinary incontinence, flat, and staring.  Brother states pt has been taking smoothies with no problem but has been chewing then spitting out solid foods. Brother states pt has been having normal BM. and urination. No falls reported. Pt [id number] with mother but brother is legal guardian.  Allergic to lipator. Brother denies weight loss, fever, chills.

## 2019-08-16 NOTE — H&P ADULT - HISTORY OF PRESENT ILLNESS
Patient is a 60 y/o male with pmhx of CVA 8 years ago leaving him aphasic and testicular cancer 10 years ago s/p unilateral orchiectomy was brought in to the ER by mother/aide and brother for decreased oral intake. Per family, he hasn't been eating well x 3 day and they have noticed that when he tries to intake solids, he vomits or seems to have a difficulty time swallowing. Per mother at bedside, he ambulates with a cane at times and at other times, is independent with walking. He has 2 12-hour aides who stay with him. He enjoys eating and has never had this issue. No fevers/chills/diarrhea. + bowel movement yesterday, normal per aide. Per aide at bedside, she had made some food/soup/liquids for him and he did not want anything. They do not think that he is in pain and are not certain why he isn't eating/drinking and would consider a peg if needed     No recent travel, sick contacts. + dependable staff caring for him.

## 2019-08-16 NOTE — H&P ADULT - NSHPLABSRESULTS_GEN_ALL_CORE
15.1   6.35  )-----------( 225      ( 16 Aug 2019 13:11 )             46.6   `08-16    144  |  104  |  16.0  ----------------------------<  88  4.5   |  29.0  |  1.03    Ca    9.7      16 Aug 2019 13:11  Mg     2.1     08-16    TPro  8.1  /  Alb  4.2  /  TBili  0.3<L>  /  DBili  x   /  AST  16  /  ALT  17  /  AlkPhos  88  08-16    < from: CT Abdomen and Pelvis w/ IV Cont (08.16.19 @ 16:49) >    Mucosal evaluation the esophagus limited on this study, otherwise   unremarkable.    Small hiatal hernia.    Large amount retained fecal material in the rectum with mild rectal wall   thickening and infiltration of the perirectal fat, likely stercoral   colitis.    Underdistended urinary bladder, limiting evaluation, however there is   diffuse wall thickening; correlation is recommended with a urinalysis for   cystitis.

## 2019-08-16 NOTE — ED PROVIDER NOTE - CLINICAL SUMMARY MEDICAL DECISION MAKING FREE TEXT BOX
60 y/o  non verbal M with vascular dementia presents to ED for decreased PO intake for 3 days. Will obtain CT  head, IV study from neck to abd to check for food boluses. If negative, will admit  for feeding tube placement. Goals of care were discussed with brother and he is amenable to plan.  Will also obtain UA, Labs, IV fluids, Pepcid, Zofran for N/V. Reassess.

## 2019-08-16 NOTE — H&P ADULT - NSHPSOCIALHISTORY_GEN_ALL_CORE
Lives with his mother. Never smoker/drinker   Has two 12-hour aides with him throughout the day Lives with his mother. Never smoker/drinker   Has two 12-hour aides with him throughout the day    No significant family hx of dysphagia

## 2019-08-16 NOTE — ED STATDOCS - PROGRESS NOTE DETAILS
59yoM with dementia,  brought in by brother who is legal guarding, presenting with trouble eating x 3 days; Pt was referred to PMD Dr. Presley Billingsley (who spoke to brother over the phone) who suggested he may need a modified barium swallow.  Brother states he has been tolerating soft foods like smoothies and regular liquids; but he has been holding food in his mouth for a long time and occasionally spitting it out and drooling, but not consistently.  No prior GI doctor.  No vomiting.  Pt has been having BM; is incontinent and wears diapers.  Brother denies any color change/ cyanosis when eating/ choking. 59yoM with dementia,  brought in by brother who is legal guarding, presenting with trouble eating x 3 days; Pt was referred to PMD Dr. Presley Billingsley (who spoke to brother over the phone) who suggested he may need a modified barium swallow.  Brother states he has been tolerating soft foods like smoothies and regular liquids; but he has been holding food in his mouth for a long time and occasionally spitting it out and drooling, but not consistently.  No prior GI doctor.  No vomiting.  Pt has been having BM; is incontinent and wears diapers.  Brother denies any color change/ cyanosis when eating/ choking.  Per brother, he had a full smoothie today.

## 2019-08-16 NOTE — ED PROVIDER NOTE - CARE PLAN
Principal Discharge DX:	Dysphagia, unspecified type  Secondary Diagnosis:	Colitis  Secondary Diagnosis:	Cystitis

## 2019-08-16 NOTE — H&P ADULT - ASSESSMENT
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 Per family, he hasn't been eating well x 3 day and they have noticed that when he tries to intake solids, he vomits or seems to have a difficulty time swallowing. Found to have large amount of fecal matter in the rectum with likely sterocoral colitis     Decreased Oral intake - could be due to pain/discomfort from sterocoral colitis and retained stool   -will try dulcolax/fleet for now as we are not certain if he can orally take medications   -d/c antibiotics ordered by ER   -NPO for now   -IVF     Dysphagia - ? progressive as part of primary dementia   -speech and swallow evaluation   -? MBS at some point   -DIet NPO for now with IVF   -If needed, GI eval for possible peg (family in agreeance)     Vascular dementia 2/2 CVA leaving him aphasic   -lipid panel   -mother says that patient was on aspirin and htn meds - taken off b/c blood pressure has been doing well   -will f/up lipid panel   -aspirin 81mg oral post evaluation. Will hold off of asa supp for now     DVT PPx - sqh     Dispo - full code. Mother wants him to be alive and well . PT eval

## 2019-08-16 NOTE — H&P ADULT - NSHPPHYSICALEXAM_GEN_ALL_CORE
General: middle aged appearing male in nad   HEENT: eomi, perrla, no nystagmus noted. no pallor. MM dry   Neck: supple.   CVS: S1S2nl no m/r/g RRR   Lungs: Clear   GI: Soft, nt, nd bs+  Ext: no c/c/e   Skin: increased skin turgor   Neuro: aphasic, not cooperating with exam     (Per MD giving me patient, stated that patient was masturbating multiple times during the ER stay, he started to do the same during my exam) General: middle aged appearing male in nad   HEENT: eomi, perrla, no nystagmus noted. no pallor. MM dry   Neck: supple.   CVS: S1S2nl no m/r/g RRR   Lungs: Clear   GI: Soft, nt, nd bs+  Ext: no c/c/e   Skin: decreased skin turgor   Neuro: aphasic, not cooperating with exam     (Per MD giving me patient, stated that patient was masturbating multiple times during the ER stay, he started to do the same during my exam)

## 2019-08-16 NOTE — ED ADULT NURSE REASSESSMENT NOTE - NS ED NURSE REASSESS COMMENT FT1
pt at CT received  2mg iv ativan patient still unable to tolerate ct, holding neck up and moving. patient nonverbal and unable to follow commands. MD Yung  contacted pt medicated in CT as per MAR. will continue to monitor

## 2019-08-16 NOTE — ED ADULT NURSE NOTE - NSIMPLEMENTINTERV_GEN_ALL_ED
Implemented All Fall with Harm Risk Interventions:  [place] to call system. Call bell, personal items and telephone within reach. Instruct patient to call for assistance. Room bathroom lighting operational. Non-slip footwear when patient is off stretcher. Physically safe environment: no spills, clutter or unnecessary equipment. Stretcher in lowest position, wheels locked, appropriate side rails in place. Provide visual cue, wrist band, yellow gown, etc. Monitor gait and stability. Monitor for mental status changes and reorient to person, place, and time. Review medications for side effects contributing to fall risk. Reinforce activity limits and safety measures with patient and family. Provide visual clues: red socks.

## 2019-08-16 NOTE — ED ADULT NURSE NOTE - OBJECTIVE STATEMENT
59 yom presents to ed with family patient is nonverbal for 2 years. unknown etiology family states patient has "dementia" unknown other medical history patient appears to be unable to speak, unable to follow commands. family denies ways of being able to know if patient in pain. patient is here because he hasn't been able to tolerate PO. at this time patient NPO unable to tolerate oral secretions.

## 2019-08-17 LAB
ALBUMIN SERPL ELPH-MCNC: 4 G/DL — SIGNIFICANT CHANGE UP (ref 3.3–5.2)
ALP SERPL-CCNC: 88 U/L — SIGNIFICANT CHANGE UP (ref 40–120)
ALT FLD-CCNC: 15 U/L — SIGNIFICANT CHANGE UP
ANION GAP SERPL CALC-SCNC: 11 MMOL/L — SIGNIFICANT CHANGE UP (ref 5–17)
AST SERPL-CCNC: 14 U/L — SIGNIFICANT CHANGE UP
BASOPHILS # BLD AUTO: 0.05 K/UL — SIGNIFICANT CHANGE UP (ref 0–0.2)
BASOPHILS NFR BLD AUTO: 0.7 % — SIGNIFICANT CHANGE UP (ref 0–2)
BILIRUB SERPL-MCNC: 0.5 MG/DL — SIGNIFICANT CHANGE UP (ref 0.4–2)
BUN SERPL-MCNC: 14 MG/DL — SIGNIFICANT CHANGE UP (ref 8–20)
CALCIUM SERPL-MCNC: 9.3 MG/DL — SIGNIFICANT CHANGE UP (ref 8.6–10.2)
CHLORIDE SERPL-SCNC: 104 MMOL/L — SIGNIFICANT CHANGE UP (ref 98–107)
CHOLEST SERPL-MCNC: 151 MG/DL — SIGNIFICANT CHANGE UP (ref 110–199)
CO2 SERPL-SCNC: 24 MMOL/L — SIGNIFICANT CHANGE UP (ref 22–29)
CREAT SERPL-MCNC: 0.92 MG/DL — SIGNIFICANT CHANGE UP (ref 0.5–1.3)
EOSINOPHIL # BLD AUTO: 0.39 K/UL — SIGNIFICANT CHANGE UP (ref 0–0.5)
EOSINOPHIL NFR BLD AUTO: 5.6 % — SIGNIFICANT CHANGE UP (ref 0–6)
GLUCOSE BLDC GLUCOMTR-MCNC: 61 MG/DL — LOW (ref 70–99)
GLUCOSE BLDC GLUCOMTR-MCNC: 68 MG/DL — LOW (ref 70–99)
GLUCOSE BLDC GLUCOMTR-MCNC: 79 MG/DL — SIGNIFICANT CHANGE UP (ref 70–99)
GLUCOSE BLDC GLUCOMTR-MCNC: 81 MG/DL — SIGNIFICANT CHANGE UP (ref 70–99)
GLUCOSE SERPL-MCNC: 79 MG/DL — SIGNIFICANT CHANGE UP (ref 70–115)
HBA1C BLD-MCNC: 5.2 % — SIGNIFICANT CHANGE UP (ref 4–5.6)
HCT VFR BLD CALC: 46 % — SIGNIFICANT CHANGE UP (ref 39–50)
HDLC SERPL-MCNC: 44 MG/DL — SIGNIFICANT CHANGE UP
HGB BLD-MCNC: 15 G/DL — SIGNIFICANT CHANGE UP (ref 13–17)
IMM GRANULOCYTES NFR BLD AUTO: 0.3 % — SIGNIFICANT CHANGE UP (ref 0–1.5)
LIPID PNL WITH DIRECT LDL SERPL: 94 MG/DL — SIGNIFICANT CHANGE UP
LYMPHOCYTES # BLD AUTO: 1.28 K/UL — SIGNIFICANT CHANGE UP (ref 1–3.3)
LYMPHOCYTES # BLD AUTO: 18.4 % — SIGNIFICANT CHANGE UP (ref 13–44)
MAGNESIUM SERPL-MCNC: 1.9 MG/DL — SIGNIFICANT CHANGE UP (ref 1.6–2.6)
MCHC RBC-ENTMCNC: 30 PG — SIGNIFICANT CHANGE UP (ref 27–34)
MCHC RBC-ENTMCNC: 32.6 GM/DL — SIGNIFICANT CHANGE UP (ref 32–36)
MCV RBC AUTO: 92 FL — SIGNIFICANT CHANGE UP (ref 80–100)
MONOCYTES # BLD AUTO: 0.46 K/UL — SIGNIFICANT CHANGE UP (ref 0–0.9)
MONOCYTES NFR BLD AUTO: 6.6 % — SIGNIFICANT CHANGE UP (ref 2–14)
NEUTROPHILS # BLD AUTO: 4.77 K/UL — SIGNIFICANT CHANGE UP (ref 1.8–7.4)
NEUTROPHILS NFR BLD AUTO: 68.4 % — SIGNIFICANT CHANGE UP (ref 43–77)
PHOSPHATE SERPL-MCNC: 3 MG/DL — SIGNIFICANT CHANGE UP (ref 2.4–4.7)
PLATELET # BLD AUTO: 180 K/UL — SIGNIFICANT CHANGE UP (ref 150–400)
POTASSIUM SERPL-MCNC: 4.1 MMOL/L — SIGNIFICANT CHANGE UP (ref 3.5–5.3)
POTASSIUM SERPL-SCNC: 4.1 MMOL/L — SIGNIFICANT CHANGE UP (ref 3.5–5.3)
PROT SERPL-MCNC: 7.6 G/DL — SIGNIFICANT CHANGE UP (ref 6.6–8.7)
RBC # BLD: 5 M/UL — SIGNIFICANT CHANGE UP (ref 4.2–5.8)
RBC # FLD: 11.9 % — SIGNIFICANT CHANGE UP (ref 10.3–14.5)
SODIUM SERPL-SCNC: 139 MMOL/L — SIGNIFICANT CHANGE UP (ref 135–145)
TOTAL CHOLESTEROL/HDL RATIO MEASUREMENT: 3 RATIO — LOW (ref 3.4–9.6)
TRIGL SERPL-MCNC: 64 MG/DL — SIGNIFICANT CHANGE UP (ref 10–200)
WBC # BLD: 6.97 K/UL — SIGNIFICANT CHANGE UP (ref 3.8–10.5)
WBC # FLD AUTO: 6.97 K/UL — SIGNIFICANT CHANGE UP (ref 3.8–10.5)

## 2019-08-17 PROCEDURE — 99232 SBSQ HOSP IP/OBS MODERATE 35: CPT

## 2019-08-17 RX ORDER — SENNA PLUS 8.6 MG/1
2 TABLET ORAL AT BEDTIME
Refills: 0 | Status: DISCONTINUED | OUTPATIENT
Start: 2019-08-17 | End: 2019-08-23

## 2019-08-17 RX ORDER — POLYETHYLENE GLYCOL 3350 17 G/17G
17 POWDER, FOR SOLUTION ORAL DAILY
Refills: 0 | Status: DISCONTINUED | OUTPATIENT
Start: 2019-08-17 | End: 2019-08-18

## 2019-08-17 RX ORDER — ASPIRIN/CALCIUM CARB/MAGNESIUM 324 MG
81 TABLET ORAL DAILY
Refills: 0 | Status: DISCONTINUED | OUTPATIENT
Start: 2019-08-17 | End: 2019-08-23

## 2019-08-17 RX ORDER — ATORVASTATIN CALCIUM 80 MG/1
40 TABLET, FILM COATED ORAL AT BEDTIME
Refills: 0 | Status: DISCONTINUED | OUTPATIENT
Start: 2019-08-17 | End: 2019-08-23

## 2019-08-17 RX ORDER — DOCUSATE SODIUM 100 MG
100 CAPSULE ORAL
Refills: 0 | Status: DISCONTINUED | OUTPATIENT
Start: 2019-08-17 | End: 2019-08-23

## 2019-08-17 RX ADMIN — SENNA PLUS 2 TABLET(S): 8.6 TABLET ORAL at 22:41

## 2019-08-17 RX ADMIN — HEPARIN SODIUM 5000 UNIT(S): 5000 INJECTION INTRAVENOUS; SUBCUTANEOUS at 05:07

## 2019-08-17 RX ADMIN — SODIUM CHLORIDE 100 MILLILITER(S): 9 INJECTION, SOLUTION INTRAVENOUS at 12:29

## 2019-08-17 RX ADMIN — ATORVASTATIN CALCIUM 40 MILLIGRAM(S): 80 TABLET, FILM COATED ORAL at 22:41

## 2019-08-17 RX ADMIN — Medication 10 MILLIGRAM(S): at 17:18

## 2019-08-17 RX ADMIN — Medication 100 MILLIGRAM(S): at 17:18

## 2019-08-17 RX ADMIN — HEPARIN SODIUM 5000 UNIT(S): 5000 INJECTION INTRAVENOUS; SUBCUTANEOUS at 17:19

## 2019-08-17 NOTE — SWALLOW BEDSIDE ASSESSMENT ADULT - COMMENTS
No overt s/s aspiration observed at bedside. Pt able to drink independently, however notable for large sip size, swishing liquid in mouth prior to swallow. Pt requires supervision during meals 2* reduced cognition to ensure adherence to aspiration precautions

## 2019-08-17 NOTE — SWALLOW BEDSIDE ASSESSMENT ADULT - ORAL PHASE
Within functional limits Decreased anterior-posterior movement of the bolus/reduced attention to bolus

## 2019-08-17 NOTE — SWALLOW BEDSIDE ASSESSMENT ADULT - SWALLOW EVAL: RECOMMENDED FEEDING/EATING TECHNIQUES
crush medication (when feasible)/maintain upright posture during/after eating for 30 mins/position upright (90 degrees)/oral hygiene/small sips/bites

## 2019-08-17 NOTE — SWALLOW BEDSIDE ASSESSMENT ADULT - ASR SWALLOW ASPIRATION MONITOR
gurgly voice/pneumonia/change of breathing pattern/cough/oral hygiene/position upright (90Y)/throat clearing/fever

## 2019-08-17 NOTE — SWALLOW BEDSIDE ASSESSMENT ADULT - SLP PERTINENT HISTORY OF CURRENT PROBLEM
Per MD note "Patient is a 60 y/o male with pmhx of CVA 8 years ago leaving him aphasic and testicular cancer 10 years ago s/p unilateral orchiectomy was brought in to the ER by mother/aide and brother for decreased oral intake. Per family, he hasn't been eating well x 3 day and they have noticed that when he tries to intake solids, he vomits or seems to have a difficulty time swallowing. Found to have large amount of fecal matter in the rectum with likely sterocoral colitis" Per MD note "Patient is a 58 y/o male with pmhx of CVA 8 years ago leaving him aphasic and testicular cancer 10 years ago s/p unilateral orchiectomy was brought in to the ER by mother/aide and brother for decreased oral intake. Per family, he hasn't been eating well x 3 day and they have noticed that when he tries to intake solids, he vomits or seems to have a difficulty time swallowing. Found to have large amount of fecal matter in the rectum with likely sterocoral colitis" Per radiology report, unremarkable frontal chest xray. Head CT - for acute infarct.

## 2019-08-17 NOTE — SWALLOW BEDSIDE ASSESSMENT ADULT - SLP GENERAL OBSERVATIONS
Pt received A&A in bed, noted to be chewing something (pt with NPO order in place). RN Preena informed and assisted at bedside to remove a piece of gauze from pt's mouth. Pt's brother contacted via phone and reported pt's baseline diet as regular/thin liquids.  Pt is nonverbal, reduced cognition, absent command following, nonverbal pain scale 0/10 pre and post evaluation

## 2019-08-17 NOTE — PROGRESS NOTE ADULT - ASSESSMENT
Patient is a 60 y/o male with pmhx of CVA 8 years ago leaving him aphasic and testicular cancer 10 years ago s/p unilateral orchiectomy was brought in to the ER by mother/aide and brother for decreased oral intake. Per family, he hasn't been eating well x 3 day and they have noticed that when he tries to intake solids, he vomits or seems to have a difficulty time swallowing. Found to have large amount of fecal matter in the rectum with likely stercoral colitis. Patient evaluated by Speech and swallow, soft mechanical diet+ thin liquids recommended. Patient is stable. Will     Decreased Oral intake - could be due to pain/discomfort from stercoral colitis and retained stool   -will try dulcolax/fleet for now as we are not certain if he can orally take medications   - S/p Antibiotics D/c, no leucocytosis no signs of peritoneal    - Will d/c IVF.      Dysphagia   -progressive as part of primary dementia   -speech and swallow evaluation completed, [facility] soft with thin liquids.       Vascular dementia 2/2 CVA leaving him aphasic   -lipid panel   -mother says that patient was on aspirin and htn meds - taken off b/c blood pressure has been doing well   -will f/up lipid panel   -aspirin 81mg oral post evaluation. Will hold off of asa supp for now     DVT PPx - C/w  sqh     Dispo - full code. Mother wants him to be alive and well . PT lucas Patient is a 58 y/o male with pmhx of CVA 8 years ago leaving him aphasic and testicular cancer 10 years ago s/p unilateral orchiectomy was brought in to the ER by mother/aide and brother for decreased oral intake. Per family, he hasn't been eating well x 3 day and they have noticed that when he tries to intake solids, he vomits or seems to have a difficulty time swallowing. Found to have large amount of fecal matter in the rectum with likely stercoral colitis. Patient evaluated by Speech and swallow, soft mechanical diet+ thin liquids recommended. Patient is stable. Will     Decreased Oral intake - could be due to pain/discomfort from stercoral colitis and retained stool   -will try dulcolax/fleet for now as we are not certain if he can orally take medications   - S/p Antibiotics D/c, no leucocytosis no signs of peritoneal    - Will d/c IVF.      Dysphagia   -progressive as part of primary dementia   -speech and swallow evaluation completed, Mech soft with thin liquids.       Vascular dementia 2/2 CVA leaving him aphasic   -mother says that patient was on aspirin and htn meds - taken off b/c blood pressure has been doing well   -lipid panel well controlled, start statin however given hx cva + risk  -aspirin 81mg oral    DVT PPx - C/w  sqh     Dispo - full code. Mother wants him to be alive and well . PT eval pending

## 2019-08-17 NOTE — SWALLOW BEDSIDE ASSESSMENT ADULT - SWALLOW EVAL: DIAGNOSIS
Oral and pharyngeal stages of swallow appear functional for mechanical soft solids and thin liquids. No overt s/s aspiration.

## 2019-08-18 ENCOUNTER — TRANSCRIPTION ENCOUNTER (OUTPATIENT)
Age: 59
End: 2019-08-18

## 2019-08-18 LAB
HCV AB S/CO SERPL IA: 0.11 S/CO — SIGNIFICANT CHANGE UP (ref 0–0.99)
HCV AB SERPL-IMP: SIGNIFICANT CHANGE UP

## 2019-08-18 PROCEDURE — 74018 RADEX ABDOMEN 1 VIEW: CPT | Mod: 26

## 2019-08-18 PROCEDURE — 99232 SBSQ HOSP IP/OBS MODERATE 35: CPT

## 2019-08-18 RX ORDER — POLYETHYLENE GLYCOL 3350 17 G/17G
17 POWDER, FOR SOLUTION ORAL DAILY
Refills: 0 | Status: DISCONTINUED | OUTPATIENT
Start: 2019-08-18 | End: 2019-08-23

## 2019-08-18 RX ORDER — LACTULOSE 10 G/15ML
10 SOLUTION ORAL EVERY 8 HOURS
Refills: 0 | Status: DISCONTINUED | OUTPATIENT
Start: 2019-08-18 | End: 2019-08-19

## 2019-08-18 RX ADMIN — HEPARIN SODIUM 5000 UNIT(S): 5000 INJECTION INTRAVENOUS; SUBCUTANEOUS at 05:44

## 2019-08-18 RX ADMIN — LACTULOSE 10 GRAM(S): 10 SOLUTION ORAL at 22:47

## 2019-08-18 RX ADMIN — ATORVASTATIN CALCIUM 40 MILLIGRAM(S): 80 TABLET, FILM COATED ORAL at 21:20

## 2019-08-18 RX ADMIN — HEPARIN SODIUM 5000 UNIT(S): 5000 INJECTION INTRAVENOUS; SUBCUTANEOUS at 17:15

## 2019-08-18 RX ADMIN — LACTULOSE 10 GRAM(S): 10 SOLUTION ORAL at 14:39

## 2019-08-18 RX ADMIN — POLYETHYLENE GLYCOL 3350 17 GRAM(S): 17 POWDER, FOR SOLUTION ORAL at 05:44

## 2019-08-18 RX ADMIN — SENNA PLUS 2 TABLET(S): 8.6 TABLET ORAL at 21:20

## 2019-08-18 RX ADMIN — Medication 10 MILLIGRAM(S): at 14:39

## 2019-08-18 RX ADMIN — Medication 81 MILLIGRAM(S): at 11:58

## 2019-08-18 RX ADMIN — Medication 100 MILLIGRAM(S): at 17:15

## 2019-08-18 RX ADMIN — POLYETHYLENE GLYCOL 3350 17 GRAM(S): 17 POWDER, FOR SOLUTION ORAL at 14:39

## 2019-08-18 RX ADMIN — Medication 100 MILLIGRAM(S): at 05:44

## 2019-08-18 NOTE — DISCHARGE NOTE PROVIDER - NSDCFUADDAPPT_GEN_ALL_CORE_FT
Please continue to take your bowel regimen as directed to help you have daily bowel movements. Please also continue to stay hydrated.     Please follow up with your PCP in 1-2 days of discharge. Please continue to take your bowel regimen as directed to help you have daily bowel movements. Please also continue to stay hydrated.     Please follow up with your PCP in 1-2 days of discharge.    Please follow up with GI doctor in 1 week of discharge.

## 2019-08-18 NOTE — DISCHARGE NOTE PROVIDER - PROVIDER TOKENS
PROVIDER:[TOKEN:[[id number]]] PROVIDER:[TOKEN:[[id number]]],PROVIDER:[TOKEN:[[id number]],FOLLOWUP:[1 week]]

## 2019-08-18 NOTE — DISCHARGE NOTE PROVIDER - HOSPITAL COURSE
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 Per family, he hasn't been eating well x 3 day and they have noticed that when he tries to intake solids, he vomits or seems to have a difficulty time swallowing.         On admission was found to have large amount of fecal matter in the rectum with likely stercoral colitis on CT abd. Was evaluated by Speech and swallow who recommended soft mechanical diet and thin liquids.          His decreased Oral intake was likely to pain/discomfort from stercoral colitis and retained stool. Received one dose of cipro and flagyl which as discontinued. Was given a dulcolax/fleet on day of admission and switched to senna, colace, and miralax during his stay. On 8/17 was noted to have a BM. Abd x ray was repeated on 8/18 which showed??improvement??            Pt also noted to have dysphagia which is likely a progressive as part of his primary dementia. After speech and swallow evaluation completed pt started on Mech soft with thin liquids. Pt was noted to tolerate PO adequately. Pt was initially started on IVFs which was discontinued because pt tolerated PO hydration adequately.            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 As per mother, pt was on aspirin and htn meds but taken off b/c blood pressure had been doing well. Although lipid panel was well controlled, was restarted on  lipitor and asa given hx of CVA risk.        All electrolyte abnormalities were monitored carefully and repleted as necessary during this hospitalization. At the time of discharge patient was hemodynamically stable and amenable to all terms of discharge. The patient has received verbal instructions from myself regarding discharge plans.         Length of Discharge: 45MIN        Vital Signs Last 24 Hrs    T(F): 97.5 (18 Aug 2019 00:00), Max: 98.1 (17 Aug 2019 08:54)    HR: 65 (18 Aug 2019 00:00) (62 - 67)    BP: 109/71 (18 Aug 2019 00:00) (109/71 - 134/80)    RR: 20 (18 Aug 2019 00:00) (18 - 20)    SpO2: 95% (18 Aug 2019 00:00) (95% - 97%)        GENERAL: Appears well developed, well nourished alert and cooperative.    HEENT: Head; normocephalic, atraumatic, PERRLA, EOMI. Moist oral mucosa.     NECK: Supple.     CARDIOVASCULAR: Normal S1 and S2, No murmur, RRR. No edema    RESPIRATORY: No rales, rhonchi or wheeze. Normal breath sounds bilaterally.    GASTROINTESTINAL: Soft, nontender without mass or organomegaly. Bowel sounds present on 4 quadrants.     EXTREMITIES: No clubbing, cyanosis or edema. No calf tenderness. Distal pulses wnl.     MUSCULOSKELETAL: 5/5 muscle strength in UE and LE.     SKIN: warm and dry with normal turgor.    NEURO: Alert. Aphasic, non cooperative.     PSYCH: normal affect. 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 Per family, he hasn't been eating well x 3 day and they have noticed that when he tries to intake solids, he vomits or seems to have a difficulty time swallowing.         On admission was found to have large amount of fecal matter in the rectum with likely stercoral colitis on CT abd. Was evaluated by Speech and swallow who recommended soft mechanical diet and thin liquids.          His decreased Oral intake was likely to pain/discomfort from stercoral colitis and retained stool. Received one dose of cipro and flagyl which as discontinued. Was given a dulcolax/fleet on day of admission and switched to senna, colace, miralax during his stay. On 8/17 was noted to have a BM. On 8/18 pt received lactulose and had 2 BMs. Abd x ray was serially repeated on 8/18 and 8/19 which showed decreased fecal material in colon daily. Prior to discharge, his abd x ray showed...        Pt also noted to have dysphagia which is likely a progressive as part of his primary dementia. After speech and swallow evaluation completed pt started on Mech soft with thin liquids. Pt was noted to tolerate PO adequately. Pt was initially started on IVFs which was discontinued because pt tolerated PO hydration adequately.            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 As per mother, pt was on aspirin and htn meds but taken off b/c blood pressure had been doing well. Although lipid panel was well controlled, was restarted on  lipitor and asa given hx of CVA risk.        All electrolyte abnormalities were monitored carefully and repleted as necessary during this hospitalization. At the time of discharge patient was hemodynamically stable and amenable to all terms of discharge. The patient has received verbal instructions from myself regarding discharge plans.         Length of Discharge: 45MIN        Vital Signs Last 24 Hrs    T(F): 97.5 (18 Aug 2019 00:00), Max: 98.1 (17 Aug 2019 08:54)    HR: 65 (18 Aug 2019 00:00) (62 - 67)    BP: 109/71 (18 Aug 2019 00:00) (109/71 - 134/80)    RR: 20 (18 Aug 2019 00:00) (18 - 20)    SpO2: 95% (18 Aug 2019 00:00) (95% - 97%)        GENERAL: Appears well developed, well nourished alert and cooperative.    HEENT: Head; normocephalic, atraumatic, PERRLA, EOMI. Moist oral mucosa.     NECK: Supple.     CARDIOVASCULAR: Normal S1 and S2, No murmur, RRR. No edema    RESPIRATORY: No rales, rhonchi or wheeze. Normal breath sounds bilaterally.    GASTROINTESTINAL: Soft, nontender without mass or organomegaly. Bowel sounds present on 4 quadrants.     EXTREMITIES: No clubbing, cyanosis or edema. No calf tenderness. Distal pulses wnl.     MUSCULOSKELETAL: 5/5 muscle strength in UE and LE.     SKIN: warm and dry with normal turgor.    NEURO: Alert. Aphasic, non cooperative.     PSYCH: normal affect. 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 Per family, he hasn't been eating well x 3 day and they have noticed that when he tries to intake solids, he vomits or seems to have a difficulty time swallowing.         On admission was found to have large amount of fecal matter in the rectum with likely stercoral colitis on CT abd. Was evaluated by Speech and swallow who recommended soft mechanical diet and thin liquids.          His decreased Oral intake was likely due to pain/discomfort from stercoral colitis and retained stool. Received one dose of cipro and flagyl which as discontinued. Was given a dulcolax/fleet on day of admission and switched to senna, colace, miralax during his stay. On 8/17 was noted to have a BM. On 8/18 pt received lactulose and had 2 BMs.  BMs were serially monitored. GI was consulted who attempted fecal disimpaction w/o success  and further adjusted his bowel regimen. Abd x ray was serially repeated until 8/21 when abd x ray showed no fecal impaction. Pt to follow up with GI outpatient for continued monitoring of stools in future.         On 8/20, pt noted to have code sepsis due to fever and tachycardia. Sepsis suspected likely GI in origin in setting of stercoral colitis/fecal impaction. Pt received 2 days of zosyn. Noted to be afebrile since and blood cultures noted to be negative. Sepsis resolved.        Pt also noted to have dysphagia which is likely a progressive as part of his primary dementia. After speech and swallow evaluation completed pt started on Mech soft with thin liquids. Pt was noted to tolerate PO adequately.         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 As per mother, pt was on aspirin and htn meds but taken off b/c blood pressure had been doing well. Although lipid panel was well controlled, was restarted on  lipitor and asa given hx of CVA risk.        All electrolyte abnormalities were monitored carefully and repleted as necessary during this hospitalization. At the time of discharge patient was hemodynamically stable and amenable to all terms of discharge. The patient has received verbal instructions from myself regarding discharge plans.         Length of Discharge: 45MIN        Vital Signs Last 24 Hrs    T(C): 37.2 (23 Aug 2019 08:50), Max: 37.2 (23 Aug 2019 08:50)    T(F): 99 (23 Aug 2019 08:50), Max: 99 (23 Aug 2019 08:50)    HR: 80 (23 Aug 2019 08:50) (80 - 95)    BP: 149/89 (23 Aug 2019 08:50) (117/74 - 149/89)    RR: 18 (23 Aug 2019 08:50) (18 - 18)    SpO2: 98% (23 Aug 2019 08:50) (96% - 98%)        GENERAL: Appears well developed, well nourished alert and cooperative.    HEENT: Head; normocephalic, atraumatic, PERRLA, EOMI. Moist oral mucosa.     NECK: Supple.     CARDIOVASCULAR: Normal S1 and S2, No murmur, RRR. No edema    RESPIRATORY: No rales, rhonchi or wheeze. Normal breath sounds bilaterally.    GASTROINTESTINAL: Soft, nontender without mass or organomegaly. Bowel sounds present on 4 quadrants.     EXTREMITIES: No clubbing, cyanosis or edema. No calf tenderness. Distal pulses wnl.     MUSCULOSKELETAL: 5/5 muscle strength in UE and LE.     SKIN: warm and dry with normal turgor.    NEURO: Alert. Aphasic, non cooperative.     PSYCH: normal affect.             EXAM:  XR ABDOMEN PORTABLE ROUTINE 1V                              PROCEDURE DATE:  08/21/2019                  INTERPRETATION:  Abdomen one view        HISTORY: Impaction        COMPARISON: 8/19/2019        Frontal view of the abdomen shows a normal gas pattern. No increased     fecal volume is identified.  No definite free air is identified.        IMPRESSION:    No evidence of fecal impaction radiographically.

## 2019-08-18 NOTE — PROGRESS NOTE ADULT - ASSESSMENT
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 Per family, he hasn't been eating well x 3 day and they have noticed that when he tries to intake solids, he vomits or seems to have a difficulty time swallowing. Found to have large amount of fecal matter in the rectum with likely stercoral colitis. Patient evaluated by Speech and swallow, soft mechanical diet+ thin liquids recommended. Patient is stable. Will     Decreased PO intake   -likely 2/2 stercoral colitis and retained stool on CT  -speech and swallow recs appreciated.  - tolerating  mechanical soft w/ thin liquids  -s/p dulcolax/fleet, had 1 BM ON.  -Abd XR this AM: Moderate amount retained fecal material in the colon, greatest in the right hemicolon and transverse colon.    - c/w lactulose and bowel regimen, f/u repeat Abd XR in Am  - likely dc in AM on bowel regimen    Dysphagia (likely 2/2 dementia)  -speech and swallow evaluation appreciated  -tolerating mech soft with thin liquids.     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 Mother wants him to be alive and well . PT eval pending Patient is a 58 y/o male with pmhx of CVA 8 years ago leaving him aphasic and testicular cancer 10 years ago s/p unilateral orchiectomy was brought in to the ER by mother/aide and brother for decreased oral intake. Per family, he hasn't been eating well x 3 day and they have noticed that when he tries to intake solids, he vomits or seems to have a difficulty time swallowing. Found to have large amount of fecal matter in the rectum with likely stercoral colitis. Patient evaluated by speech and swallow, recommended soft mechanical diet/ thin liquids. Is tolerating well. Will continue with lactulose/ bowel regimen and repeat CXR in AM. Likely DC in AM. Patient is stable.    Decreased PO intake   -likely 2/2 stercoral colitis and retained stool on CT  -speech and swallow recs appreciated.  - tolerating  mechanical soft w/ thin liquids  -s/p dulcolax/fleet, had 1 BM ON.  -Abd XR this AM: Moderate amount retained fecal material in the colon, greatest in the right hemicolon and transverse colon.    - c/w lactulose and bowel regimen, f/u repeat Abd XR in Am  - likely dc in AM on bowel regimen    Dysphagia (likely 2/2 dementia)  -speech and swallow evaluation appreciated  -tolerating mech soft with thin liquids.     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 Mother wants him to be alive and well . PT eval pending Patient is a 58 y/o male with pmhx of CVA 8 years ago leaving him aphasic and testicular cancer 10 years ago s/p unilateral orchiectomy was brought in to the ER by mother/aide and brother for decreased oral intake. Per family, he hasn't been eating well x 3 day and they have noticed that when he tries to intake solids, he vomits or seems to have a difficulty time swallowing. Found to have large amount of fecal matter in the rectum with likely stercoral colitis. Patient evaluated by speech and swallow, recommended soft mechanical diet/ thin liquids. Is tolerating well. Will continue with lactulose/ bowel regimen and repeat Abd XR in AM. Likely DC in AM. Patient is stable.    Decreased PO intake   -likely 2/2 stercoral colitis and retained stool on CT  -speech and swallow recs appreciated.  - tolerating  mechanical soft w/ thin liquids  -s/p dulcolax/fleet, had 1 BM ON.  -Abd XR this AM: Moderate amount retained fecal material in the colon, greatest in the right hemicolon and transverse colon.    - c/w lactulose and bowel regimen, f/u repeat Abd XR in Am  - likely dc in AM on bowel regimen    Dysphagia (likely 2/2 dementia)  -speech and swallow evaluation appreciated  -tolerating mech soft with thin liquids.     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 Mother wants him to be alive and well . PT eval pending

## 2019-08-18 NOTE — DISCHARGE NOTE PROVIDER - NSDCCPCAREPLAN_GEN_ALL_CORE_FT
PRINCIPAL DISCHARGE DIAGNOSIS  Diagnosis: Decreased oral intake  Assessment and Plan of Treatment: On admission you were noted to have decreased oral intake. This is likely due to the stercoral colitis with large amounts of stool seen on CT scan of your abdomen. During your stay you were started on IV fluids. You were seen by speech and swallow team who started you on a mechanical soft diet with thin liquids. You were noted to tolerate this diet and drink oral liquids. Your IV fluids were discontinued. You were also given a bowel regimen to help pass stool. Prior to admission you were noted to have a bowel movement. You had a repeat abdominal x ray which showed????  Please continue to eat and drink regularly and continue with your bowel regimen daily. Please follow up with your PCP in 1-2 days of discharge.      SECONDARY DISCHARGE DIAGNOSES  Diagnosis: Dementia, vascular  Assessment and Plan of Treatment: You have a history of vascular dementia. During your stay you had a lipid panel done which showed adequate control of your lipids. You were restarted on aspirin and statin due to your risk of cerebrovascular accidents. Please continue to take all medications as prescribe. Please follow up with your PCP in 1-2 days and your neurologist in 5-7 days of discharge.    Diagnosis: Dysphagia  Assessment and Plan of Treatment: You were noted to have dysphagia which is likely due to your progressive dementia. During your stay you had a speech and swallow eval and started on a mechanical soft diet with thin liquids. You were noted to tolerate this diet well. Your PO intake was monitored daily and noted to improve.   Please continue to eat and drink regularly and continue with your bowel regimen daily. Please follow up with your PCP in 1-2 days of discharge. PRINCIPAL DISCHARGE DIAGNOSIS  Diagnosis: Decreased oral intake  Assessment and Plan of Treatment: On admission you were noted to have decreased oral intake. This is likely due to the stercoral colitis with large amounts of stool seen on CT scan of your abdomen. During your stay you were started on IV fluids. You were seen by speech and swallow team who started you on a mechanical soft diet with thin liquids. You were noted to tolerate this diet and drink oral liquids. Your IV fluids were discontinued. You were also given a bowel regimen to help pass stool. Prior to admission you were noted to have a bowel movement. You had serial abdominal x rays which showed progressive improvement daily. Prior to discharge your abd x ray showed....???  Please continue to eat and drink regularly and continue with your bowel regimen daily. Please follow up with your PCP in 1-2 days of discharge.      SECONDARY DISCHARGE DIAGNOSES  Diagnosis: Dementia, vascular  Assessment and Plan of Treatment: You have a history of vascular dementia. During your stay you had a lipid panel done which showed adequate control of your lipids. You were restarted on aspirin and statin due to your risk of cerebrovascular accidents. Please continue to take all medications as prescribe. Please follow up with your PCP in 1-2 days and your neurologist in 5-7 days of discharge.    Diagnosis: Dysphagia  Assessment and Plan of Treatment: You were noted to have dysphagia which is likely due to your progressive dementia. During your stay you had a speech and swallow eval and started on a mechanical soft diet with thin liquids. You were noted to tolerate this diet well. Your PO intake was monitored daily and noted to improve.   Please continue to eat and drink regularly and continue with your bowel regimen daily. Please follow up with your PCP in 1-2 days of discharge. PRINCIPAL DISCHARGE DIAGNOSIS  Diagnosis: Decreased oral intake  Assessment and Plan of Treatment: On admission you were noted to have decreased oral intake. This is likely due to the stercoral colitis with large amounts of stool seen on CT scan of your abdomen. During your stay you were started on IV fluids. You were seen by speech and swallow team who started you on a mechanical soft diet with thin liquids. You were noted to tolerate this diet and drink oral liquids. Your IV fluids were discontinued. You were also given a bowel regimen to help pass stool. GI doctor was called who further gave recommendations. Prior to admission you were noted to have a bowel movements at baseline. You had serial abdominal x rays which showed progressive improvement daily. Prior to discharge your abd x ray showed no fecal impaction.  Please continue to eat and drink regularly and continue with your bowel regimen daily. Please follow up with your PCP in 1-2 days of discharge. Please also follow up with gastro doctor in 1 week of discharge for monitoring of regular bowel movements and adjust accordingly.      SECONDARY DISCHARGE DIAGNOSES  Diagnosis: Dementia, vascular  Assessment and Plan of Treatment: You have a history of vascular dementia. During your stay you had a lipid panel done which showed adequate control of your lipids. You were restarted on aspirin and statin due to your risk of cerebrovascular accidents. Please continue to take all medications as prescribe. Please follow up with your PCP in 1-2 days and your neurologist in 5-7 days of discharge.    Diagnosis: Dysphagia  Assessment and Plan of Treatment: You were noted to have dysphagia which is likely due to your progressive dementia. During your stay you had a speech and swallow eval and started on a mechanical soft diet with thin liquids. You were noted to tolerate this diet well. Your PO intake was monitored daily and noted to improve.   Please continue to eat and drink regularly and continue with your bowel regimen daily. Please follow up with your PCP in 1-2 days of discharge.

## 2019-08-18 NOTE — DISCHARGE NOTE PROVIDER - NSDCFUADDINST_GEN_ALL_CORE_FT
XRAY CHEST: Heart is normal in size.    The lung fields and pleural surfaces are unremarkable.    The above findings are similar to August 16 at this year.    The present film shows mild stomach distention with air.    IMPRESSION: Mild stomach distention with air    XRAY ABDOMEN: Frontal view of the abdomen shows a normal gas pattern. No increased   fecal volume is identified.  No definite free air is identified.    IMPRESSION:  No evidence of fecal impaction radiographically.    CT ABDOMEN PELVIS:             < end of copied text >    Mucosal evaluation the esophagus limited on this study, otherwise   unremarkable.    Small hiatal hernia.    Large amount retained fecal material in the rectum with mild rectal wall   thickening and infiltration of the perirectal fat, likely stercoral   colitis.    Underdistended urinary bladder, limiting evaluation, however there is   diffuse wall thickening; correlation is recommended with a urinalysis for   cystitis.    CT NECK: Unremarkable visualized aerodigestive tract. Moderate   chronic paranasal sinus disease.    Hemoglobin A1C, Whole Blood: 5.2 % (08.17.19 @ 12:05)  Cholesterol, Serum: 151 mg/dL (08.17.19 @ 12:05)  Triglycerides, Serum: 64 mg/dL (08.17.19 @ 12:05)  HDL: 44  LDL: 94  08-22    139  |  107  |  13.0  ----------------------------<  106  4.5   |  21.0<L>  |  1.08    Ca    8.6      22 Aug 2019 07:22  Phos  2.8     08-22  Mg     2.0     08-22                            13.3   10.29 )-----------( 237      ( 23 Aug 2019 08:06 )             41.3

## 2019-08-18 NOTE — DISCHARGE NOTE PROVIDER - CARE PROVIDERS DIRECT ADDRESSES
[email or web address]

## 2019-08-18 NOTE — DISCHARGE NOTE PROVIDER - CARE PROVIDER_API CALL
Sandro Yang (MD)  Gastroenterology; Internal Medicine  [address] [facility] [facility], [place] [address]  Phone: [phone]  Fax: [phone]  Follow Up Time: Sandro Yang)  Gastroenterology; Internal Medicine  [address] [facility] [facility], [address]  [place] [address]  Phone: [phone]  Fax: [phone]  Follow Up Time:     Presley Billingsley)  Internal Medicine  [address] [place] [address]  Phone: [phone]  Fax: [phone]  Follow Up Time: 1 week

## 2019-08-19 PROCEDURE — 99232 SBSQ HOSP IP/OBS MODERATE 35: CPT

## 2019-08-19 PROCEDURE — 74018 RADEX ABDOMEN 1 VIEW: CPT | Mod: 26

## 2019-08-19 RX ORDER — LACTULOSE 10 G/15ML
10 SOLUTION ORAL ONCE
Refills: 0 | Status: COMPLETED | OUTPATIENT
Start: 2019-08-19 | End: 2019-08-19

## 2019-08-19 RX ORDER — LACTULOSE 10 G/15ML
20 SOLUTION ORAL EVERY 8 HOURS
Refills: 0 | Status: DISCONTINUED | OUTPATIENT
Start: 2019-08-19 | End: 2019-08-21

## 2019-08-19 RX ADMIN — HEPARIN SODIUM 5000 UNIT(S): 5000 INJECTION INTRAVENOUS; SUBCUTANEOUS at 05:58

## 2019-08-19 RX ADMIN — LACTULOSE 10 GRAM(S): 10 SOLUTION ORAL at 08:03

## 2019-08-19 RX ADMIN — HEPARIN SODIUM 5000 UNIT(S): 5000 INJECTION INTRAVENOUS; SUBCUTANEOUS at 17:02

## 2019-08-19 RX ADMIN — LACTULOSE 20 GRAM(S): 10 SOLUTION ORAL at 21:15

## 2019-08-19 RX ADMIN — Medication 100 MILLIGRAM(S): at 05:58

## 2019-08-19 RX ADMIN — SENNA PLUS 2 TABLET(S): 8.6 TABLET ORAL at 21:15

## 2019-08-19 RX ADMIN — ATORVASTATIN CALCIUM 40 MILLIGRAM(S): 80 TABLET, FILM COATED ORAL at 21:15

## 2019-08-19 RX ADMIN — Medication 10 MILLIGRAM(S): at 16:14

## 2019-08-19 RX ADMIN — Medication 100 MILLIGRAM(S): at 17:02

## 2019-08-19 RX ADMIN — POLYETHYLENE GLYCOL 3350 17 GRAM(S): 17 POWDER, FOR SOLUTION ORAL at 16:17

## 2019-08-19 RX ADMIN — Medication 81 MILLIGRAM(S): at 16:17

## 2019-08-19 RX ADMIN — LACTULOSE 10 GRAM(S): 10 SOLUTION ORAL at 05:59

## 2019-08-19 RX ADMIN — LACTULOSE 20 GRAM(S): 10 SOLUTION ORAL at 16:17

## 2019-08-19 NOTE — DIETITIAN INITIAL EVALUATION ADULT. - PERTINENT LABORATORY DATA
08-17 Na139 mmol/L Glu 79 mg/dL K+ 4.1 mmol/L Cr  0.92 mg/dL BUN 14.0 mg/dL Phos 3.0 mg/dL Alb 4.0 g/dL PAB n/a

## 2019-08-19 NOTE — DIETITIAN INITIAL EVALUATION ADULT. - OTHER INFO
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 Per family, he hasn't been eating well x 3 day and they have noticed that when he tries to intake solids, he vomits or seems to have difficulty time swallowing. Found to have large amount of fecal matter in the rectum with likely stercoral colitis. Patient evaluated by speech and swallow, recommended soft mechanical diet/ thin liquids now Is tolerating well with a good appetite.

## 2019-08-19 NOTE — PHYSICAL THERAPY INITIAL EVALUATION ADULT - BED MOBILITY LIMITATIONS, REHAB EVAL
decreased ability to use legs for bridging/pushing/decreased ability to use arms for pushing/pulling/impaired ability to control trunk for mobility
Mother  Still living? No  Family history of CHF (congestive heart failure), Age at diagnosis: Age Unknown  Family history of breast cancer, Age at diagnosis: Age Unknown     Father  Still living? No  Family history of lung cancer, Age at diagnosis: Age Unknown

## 2019-08-19 NOTE — PROGRESS NOTE ADULT - ASSESSMENT
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 Per family, he hasn't been eating well x 3 day and they have noticed that when he tries to intake solids, he vomits or seems to have a difficulty time swallowing. Found to have large amount of fecal matter in the rectum with likely stercoral colitis. Patient evaluated by speech and swallow, recommended soft mechanical diet/ thin liquids. Is tolerating well. Will continue with lactulose/ bowel regimen. Increased dose of lactulose to 20mg TID. AM Abd XR showed mod amt of fecal material in colon, greater in the R hemicolon and transverse colon. Will continue to monitor for BMs and repeat Abd XR. Likely DC in AM. Patient is stable.    Decreased PO intake   -likely 2/2 stercoral colitis and retained stool on CT  -speech and swallow recs appreciated, tolerating  mechanical soft w/ thin liquids  -s/p dulcolax/fleet, and lactulose, had 2 large BMs yesterday.  -Abd XR this AM: Moderate amount retained fecal material in the colon, greatest in the right hemicolon and transverse colon.    - increased dose of lactulose, c/w bowel regimen, f/u repeat Abd XR after BM.  - likely dc in AM on bowel regimen, will refer to GI for continued monitoring.    Dysphagia (likely 2/2 dementia)  -speech and swallow evaluation appreciated  -tolerating mech soft with thin liquids.     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 Mother wants him to be alive and well . PT eval pending

## 2019-08-19 NOTE — PHYSICAL THERAPY INITIAL EVALUATION ADULT - ADDITIONAL COMMENTS
Per mother, pt amb with a walking stick or sometimes without an AD, but someone is always by his side due to residual left sided weakness from CVA. Pt has either 4 steps to enter with a hand rail or a ramp to get in. Pt's brother reporting that they will use the ramp to enter. Pt non verbal, following most commands and does not demonstrate non verbal indicators of pain.

## 2019-08-19 NOTE — PHYSICAL THERAPY INITIAL EVALUATION ADULT - PERTINENT HX OF CURRENT PROBLEM, REHAB EVAL
Pt with hx of CVA, and dementia and is non verbal presents to [facility] with reports of decreased PO intake, found to have bowel impaction Breath sounds clear and equal bilaterally.

## 2019-08-20 LAB
ALBUMIN SERPL ELPH-MCNC: 3.7 G/DL — SIGNIFICANT CHANGE UP (ref 3.3–5.2)
ALP SERPL-CCNC: 91 U/L — SIGNIFICANT CHANGE UP (ref 40–120)
ALT FLD-CCNC: 19 U/L — SIGNIFICANT CHANGE UP
ANION GAP SERPL CALC-SCNC: 13 MMOL/L — SIGNIFICANT CHANGE UP (ref 5–17)
APTT BLD: 39.6 SEC — HIGH (ref 27.5–36.3)
AST SERPL-CCNC: 16 U/L — SIGNIFICANT CHANGE UP
BASOPHILS # BLD AUTO: 0.04 K/UL — SIGNIFICANT CHANGE UP (ref 0–0.2)
BASOPHILS NFR BLD AUTO: 0.3 % — SIGNIFICANT CHANGE UP (ref 0–2)
BILIRUB SERPL-MCNC: 0.3 MG/DL — LOW (ref 0.4–2)
BUN SERPL-MCNC: 13 MG/DL — SIGNIFICANT CHANGE UP (ref 8–20)
CALCIUM SERPL-MCNC: 9.8 MG/DL — SIGNIFICANT CHANGE UP (ref 8.6–10.2)
CHLORIDE SERPL-SCNC: 103 MMOL/L — SIGNIFICANT CHANGE UP (ref 98–107)
CO2 SERPL-SCNC: 22 MMOL/L — SIGNIFICANT CHANGE UP (ref 22–29)
CREAT SERPL-MCNC: 1.28 MG/DL — SIGNIFICANT CHANGE UP (ref 0.5–1.3)
EOSINOPHIL # BLD AUTO: 0.37 K/UL — SIGNIFICANT CHANGE UP (ref 0–0.5)
EOSINOPHIL NFR BLD AUTO: 2.8 % — SIGNIFICANT CHANGE UP (ref 0–6)
GLUCOSE BLDC GLUCOMTR-MCNC: 140 MG/DL — HIGH (ref 70–99)
GLUCOSE SERPL-MCNC: 158 MG/DL — HIGH (ref 70–115)
HCT VFR BLD CALC: 46.7 % — SIGNIFICANT CHANGE UP (ref 39–50)
HGB BLD-MCNC: 15.6 G/DL — SIGNIFICANT CHANGE UP (ref 13–17)
IMM GRANULOCYTES NFR BLD AUTO: 0.2 % — SIGNIFICANT CHANGE UP (ref 0–1.5)
INR BLD: 1.15 RATIO — SIGNIFICANT CHANGE UP (ref 0.88–1.16)
LACTATE BLDV-MCNC: 1.9 MMOL/L — SIGNIFICANT CHANGE UP (ref 0.5–2)
LYMPHOCYTES # BLD AUTO: 1.59 K/UL — SIGNIFICANT CHANGE UP (ref 1–3.3)
LYMPHOCYTES # BLD AUTO: 12.2 % — LOW (ref 13–44)
MCHC RBC-ENTMCNC: 30 PG — SIGNIFICANT CHANGE UP (ref 27–34)
MCHC RBC-ENTMCNC: 33.4 GM/DL — SIGNIFICANT CHANGE UP (ref 32–36)
MCV RBC AUTO: 89.8 FL — SIGNIFICANT CHANGE UP (ref 80–100)
MONOCYTES # BLD AUTO: 1.21 K/UL — HIGH (ref 0–0.9)
MONOCYTES NFR BLD AUTO: 9.3 % — SIGNIFICANT CHANGE UP (ref 2–14)
NEUTROPHILS # BLD AUTO: 9.81 K/UL — HIGH (ref 1.8–7.4)
NEUTROPHILS NFR BLD AUTO: 75.2 % — SIGNIFICANT CHANGE UP (ref 43–77)
PLATELET # BLD AUTO: 280 K/UL — SIGNIFICANT CHANGE UP (ref 150–400)
POTASSIUM SERPL-MCNC: 4 MMOL/L — SIGNIFICANT CHANGE UP (ref 3.5–5.3)
POTASSIUM SERPL-SCNC: 4 MMOL/L — SIGNIFICANT CHANGE UP (ref 3.5–5.3)
PROCALCITONIN SERPL-MCNC: 0.09 NG/ML — SIGNIFICANT CHANGE UP (ref 0.02–0.1)
PROT SERPL-MCNC: 7.4 G/DL — SIGNIFICANT CHANGE UP (ref 6.6–8.7)
PROTHROM AB SERPL-ACNC: 13.3 SEC — HIGH (ref 10–12.9)
RBC # BLD: 5.2 M/UL — SIGNIFICANT CHANGE UP (ref 4.2–5.8)
RBC # FLD: 12.1 % — SIGNIFICANT CHANGE UP (ref 10.3–14.5)
SODIUM SERPL-SCNC: 138 MMOL/L — SIGNIFICANT CHANGE UP (ref 135–145)
WBC # BLD: 13.05 K/UL — HIGH (ref 3.8–10.5)
WBC # FLD AUTO: 13.05 K/UL — HIGH (ref 3.8–10.5)

## 2019-08-20 PROCEDURE — 99233 SBSQ HOSP IP/OBS HIGH 50: CPT

## 2019-08-20 PROCEDURE — 99223 1ST HOSP IP/OBS HIGH 75: CPT

## 2019-08-20 PROCEDURE — 71045 X-RAY EXAM CHEST 1 VIEW: CPT | Mod: 26

## 2019-08-20 RX ORDER — SODIUM CHLORIDE 9 MG/ML
1000 INJECTION INTRAMUSCULAR; INTRAVENOUS; SUBCUTANEOUS ONCE
Refills: 0 | Status: COMPLETED | OUTPATIENT
Start: 2019-08-20 | End: 2019-08-20

## 2019-08-20 RX ORDER — MINERAL OIL
133 OIL (ML) MISCELLANEOUS EVERY 12 HOURS
Refills: 0 | Status: COMPLETED | OUTPATIENT
Start: 2019-08-20 | End: 2019-08-22

## 2019-08-20 RX ORDER — PIPERACILLIN AND TAZOBACTAM 4; .5 G/20ML; G/20ML
3.38 INJECTION, POWDER, LYOPHILIZED, FOR SOLUTION INTRAVENOUS ONCE
Refills: 0 | Status: COMPLETED | OUTPATIENT
Start: 2019-08-20 | End: 2019-08-20

## 2019-08-20 RX ORDER — MULTIVIT WITH MIN/MFOLATE/K2 340-15/3 G
1 POWDER (GRAM) ORAL ONCE
Refills: 0 | Status: COMPLETED | OUTPATIENT
Start: 2019-08-20 | End: 2019-08-20

## 2019-08-20 RX ORDER — PIPERACILLIN AND TAZOBACTAM 4; .5 G/20ML; G/20ML
3.38 INJECTION, POWDER, LYOPHILIZED, FOR SOLUTION INTRAVENOUS EVERY 8 HOURS
Refills: 0 | Status: COMPLETED | OUTPATIENT
Start: 2019-08-20 | End: 2019-08-22

## 2019-08-20 RX ADMIN — HEPARIN SODIUM 5000 UNIT(S): 5000 INJECTION INTRAVENOUS; SUBCUTANEOUS at 05:07

## 2019-08-20 RX ADMIN — Medication 10 MILLIGRAM(S): at 12:19

## 2019-08-20 RX ADMIN — PIPERACILLIN AND TAZOBACTAM 200 GRAM(S): 4; .5 INJECTION, POWDER, LYOPHILIZED, FOR SOLUTION INTRAVENOUS at 21:56

## 2019-08-20 RX ADMIN — Medication 100 MILLIGRAM(S): at 05:07

## 2019-08-20 RX ADMIN — LACTULOSE 20 GRAM(S): 10 SOLUTION ORAL at 21:56

## 2019-08-20 RX ADMIN — SODIUM CHLORIDE 1000 MILLILITER(S): 9 INJECTION INTRAMUSCULAR; INTRAVENOUS; SUBCUTANEOUS at 18:14

## 2019-08-20 RX ADMIN — Medication 81 MILLIGRAM(S): at 12:21

## 2019-08-20 RX ADMIN — Medication 100 MILLIGRAM(S): at 17:02

## 2019-08-20 RX ADMIN — POLYETHYLENE GLYCOL 3350 17 GRAM(S): 17 POWDER, FOR SOLUTION ORAL at 12:21

## 2019-08-20 RX ADMIN — Medication 650 MILLIGRAM(S): at 17:25

## 2019-08-20 RX ADMIN — ATORVASTATIN CALCIUM 40 MILLIGRAM(S): 80 TABLET, FILM COATED ORAL at 21:55

## 2019-08-20 RX ADMIN — LACTULOSE 20 GRAM(S): 10 SOLUTION ORAL at 05:14

## 2019-08-20 RX ADMIN — SENNA PLUS 2 TABLET(S): 8.6 TABLET ORAL at 21:56

## 2019-08-20 RX ADMIN — LACTULOSE 20 GRAM(S): 10 SOLUTION ORAL at 14:07

## 2019-08-20 RX ADMIN — Medication 133 MILLILITER(S): at 21:56

## 2019-08-20 RX ADMIN — Medication 1 BOTTLE: at 14:08

## 2019-08-20 RX ADMIN — HEPARIN SODIUM 5000 UNIT(S): 5000 INJECTION INTRAVENOUS; SUBCUTANEOUS at 17:03

## 2019-08-20 NOTE — SEPSIS NOTE - NSSUBJFT_GEN_A_CORE
58 y/o male with pmhx of CVA 8 years ago leaving him aphasic was brought in to the ER by mother/aide and brother for decreased oral intake. Found to have large amount of fecal matter in the rectum with likely stercoral colitis. Sepsis called due to fever and tachycardia. Sepsis likely GI in orgin in setting of stercoral colitis/fecal impaction.

## 2019-08-20 NOTE — SEPSIS NOTE - ASSESSMENT
Labs:  Lactate- 1.9  Procalcitonin- 0.09                                       15.6  13.05 )----------( 280      ( 20 Aug 2019 18:23 )             46.7     Pt. s/p  code sepsis likely 2* GI source- IV bolus given and zosyn started. Labs:  Lactate- 1.9  Procalcitonin- 0.09                                       15.6  13.05 )----------( 280      ( 20 Aug 2019 18:23 )             46.7     CXR- no infiltrates, + mild gas stomach  A/P- Pt. s/p  code sepsis likely 2* GI source- IV bolus given and zosyn started.

## 2019-08-20 NOTE — CONSULT NOTE ADULT - ASSESSMENT
Fecal impaction persists.  Pt is mentally challenged and immobile.  No issues with narcs.  Able to swallow without restriction.  No issue re PEG.    Needs lubrication and laxation.    Ordered:  Mineral oil enema pr q 12 h x 4 doses.      If no response to the Citroma/ Miralax/ Senna, , then Lactulose 20 gm po q 4 h until results.    Eventual high fiber diet with Metamucil supplements:  2 TBSP po bid in large glass of water.

## 2019-08-20 NOTE — PROGRESS NOTE ADULT - ASSESSMENT
Patient is a 60 y/o male with pmhx of CVA 8 years ago leaving him aphasic and testicular cancer 10 years ago s/p unilateral orchiectomy was brought in to the ER by mother/aide and brother for decreased oral intake. Per family, he hasn't been eating well x 3 day and they have noticed that when he tries to intake solids, he vomits or seems to have a difficulty time swallowing. Found to have large amount of fecal matter in the rectum with likely stercoral colitis. Patient evaluated by speech and swallow, recommended soft mechanical diet/ thin liquids. Is tolerating well. Will continue with lactulose/ bowel regimen. Increased dose of lactulose to 20mg TID. AM Abd XR showed mod amt of fecal material in colon, greater in the R hemicolon and transverse colon. On yesterdays abd XR, moderated fecal colon greater in the R hemicolon and transverse colon. Dr. Yang attempted fecal disimpaction with no success Recommended magnesium citrate and if that does not work give lactulose 30mg q4 hrs standing.      Decreased PO intake   -likely 2/2 stercoral colitis and retained stool on CT  -speech and swallow recs appreciated, tolerating  mechanical soft w/ thin liquids  -s/p dulcolax/fleet, and lactulose, had 2 large BMs yesterday.  -Abd XR 8/18: Moderate amount retained fecal material in the colon, greatest in the right hemicolon and transverse colon.    - XR 8/19: moderated fecal colon greater in the R hemicolon and transverse colon  - GI recs appreciated. Dr. Yang attempted fecal disimpaction with no success Recommeded magnesium citrate and if necessary, lactulose 30mg q4 hrs standing. (held because pt s/p code sepsis for R temp or 101.6 and , likely stercoral colitis)  -S/p 2 BMs this afternoon. c/w lactulose, c/w bowel regimen   - f/u repeat Abd XR after BM.    Dysphagia (likely 2/2 dementia)  -speech and swallow evaluation appreciated  -tolerating mech soft with thin liquids.     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 Mother wants him to be alive and well . PT eval pending

## 2019-08-20 NOTE — SEPSIS NOTE - NSSUBJFT_GEN_A_CORE
60 y/o male with pmhx of CVA 8 years ago leaving him aphasic was brought in to the ER by mother/aide and brother for decreased oral intake. Found to have large amount of fecal matter in the rectum with likely stercoral colitis. Sepsis called due to fever and tachycardia. Sepsis likely GI in origin in setting of stercoral colitis/fecal impaction.

## 2019-08-20 NOTE — SEPSIS NOTE - ASSESSMENT
60 y/o male with pmhx of CVA 8 years ago leaving him aphasic was brought in to the ER by mother/aide and brother for decreased oral intake. Found to have large amount of fecal matter in the rectum with likely stercoral colitis. Sepsis called due to fever and tachycardia. Sepsis likely GI in orgin in setting of stercoral colitis/fecal impaction.     Sepsis  - Pt not ill appearing.   > Zosyn w/ florastor  > IV fluids x 1 L Bolus, will re-asses after lactate returns for possible continuation of fluids to 30 ml/kk   > CBC, CMP, UA, blood & urine culture, lactate  > CXR    D/w Primary care attending Dr. Parnell who agrees with plan, RRT attending at bedside. Care discussed with staff, and family.   Medical PA to follow up.

## 2019-08-20 NOTE — CHART NOTE - NSCHARTNOTEFT_GEN_A_CORE
Patient seen and examined at the bedside with resident team. I was physically present for the key portions of the evaluation and management services provided.  history, physical, assessment, and plan per their note dated for today with the necessary amendments/elaborations below:    clinically stable. pt w/ continued fecal impaction, current bowel regimen suboptimal despite initially being effective. cont, add mg citrate. gi eval appreciated, unable to disimpact manually, discussed augmentation of bowel regimen, if no BM after mg citrate will inc lactulose to 30g and give q4h until adequate movement noted. repeat abd xr in am to follow up. Patient seen and examined at the bedside with resident team. I was physically present for the key portions of the evaluation and management services provided.  history, physical, assessment, and plan per their note dated for today with the necessary amendments/elaborations below:    clinically stable. pt w/ continued fecal impaction, current bowel regimen suboptimal despite initially being effective. cont, add mg citrate. gi eval appreciated, unable to disimpact manually, discussed augmentation of bowel regimen, mineral oil enemas, if no BM afterwards theb inc lactulose to q4h until adequate movement noted. repeat abd xr in am to follow up. inc fiber intake.

## 2019-08-20 NOTE — CONSULT NOTE ADULT - SUBJECTIVE AND OBJECTIVE BOX
HPI:  Patient is a 58 y/o male with pmhx of CVA 8 years ago leaving him aphasic and total care at home with 24 hr aids.   Prior testicular cancer 10 years ago s/p unilateral orchiectomy, resolved.  Pt was brought in to the ER by mother/aide and brother for decreased oral intake, anorexia, nausea.  No BM's for several days pta.   Per mother at bedside, he ambulates with a cane at times and at other times, is independent with walking. He has 2 12-hour aides who stay with him. He enjoys eating and has never had this issue. No fevers/chills.  No weight loss.     Initial CT scan showed a fecal impaction with rectal wall thickening and mild gila-rectal stranding.  No obstruction or perforation.  No diverticular disease.  No small bowel issues;  small Hiatus hernia noted.    No new stroke.  Pt is non-verbal but awake.  Doesn't cooperate well.   No distress.  Not septic.  Bowel movements not optimal in house despiteuse of multiple agents.    Bisacodyl suppositories; Docusate 100 mg po BID; Lactulose 10 g po Q8h. Miralax 1 dose / day.  Senna 2 tabs q hs.    Nurses reported a BM overnight.    KUB's show retained stool in the rectosigmoid colon.        Swallowing evals: Passed and started on mechanical soft/ thin liquids. Now tolerates well.       No recent travel, sick contacts. + dependable staff caring for him.   No prior colonoscopy or endoscopy.  PEG is not an issue.        PAST MEDICAL & SURGICAL HISTORY:  Vascular dementia  Nonverbal  CVA (cerebral vascular accident): 2013  S/P orchiectomy  H/O sinus surgery      ROS:  No Heartburn, regurgitation, dysphagia, odynophagia.  No dyspepsia  No abdominal pain.    No Nausea, vomiting.  No Bleeding.  No hematemesis.   No diarrhea.    No hematochesia.  No weight loss, anorexia.  No edema.      MEDICATIONS  (STANDING):  aspirin  chewable 81 milliGRAM(s) Oral daily  atorvastatin 40 milliGRAM(s) Oral at bedtime  bisacodyl Suppository 10 milliGRAM(s) Rectal daily  docusate sodium 100 milliGRAM(s) Oral two times a day  heparin  Injectable 5000 Unit(s) SubCutaneous every 12 hours  lactulose Syrup 20 Gram(s) Oral every 8 hours  magnesium citrate Oral Solution 1 Bottle Oral once  polyethylene glycol 3350 17 Gram(s) Oral daily  senna 2 Tablet(s) Oral at bedtime    MEDICATIONS  (PRN):  acetaminophen   Tablet .. 650 milliGRAM(s) Oral every 6 hours PRN Temp greater or equal to 38C (100.4F), Mild Pain (1 - 3)  ondansetron Injectable 4 milliGRAM(s) IV Push every 6 hours PRN Nausea      Allergies  No Known Allergies    SOCIAL HISTORY:  NC    FAMILY HISTORY:  NC      Vital Signs Last 24 Hrs  T(C): 36.9 (20 Aug 2019 08:33), Max: 37.1 (19 Aug 2019 16:20)  T(F): 98.4 (20 Aug 2019 08:33), Max: 98.8 (19 Aug 2019 16:20)  HR: 114 (20 Aug 2019 08:33) (72 - 114)  BP: 112/74 (20 Aug 2019 08:33) (110/81 - 116/78)  BP(mean): --  RR: 18 (20 Aug 2019 08:33) (18 - 18)  SpO2: 95% (20 Aug 2019 08:33) (95% - 98%)    PHYSICAL EXAM:    GENERAL: NAD, well-groomed, well-developed  HEAD:  Atraumatic, Normocephalic  EYES: EOMI, PERRLA, conjunctiva and sclera clear  ENMT: No tonsillar erythema, exudates, or enlargement; Moist mucous membranes, Good dentition, No lesions  NECK: Supple, No JVD, Normal thyroid  CHEST/LUNG: Clear to percussion bilaterally; No rales, rhonchi, wheezing, or rubs  HEART: Regular rate and rhythm; No murmurs, rubs, or gallops  ABDOMEN: Soft, Nontender, Nondistended; Bowel sounds present;  No scars.  No HSM.  No MTR.    BISMARK:  No lesions.  No hemorrhoids.  Hard formed stool at the tip of the finged.  Not able to get a manual disimpaction.  Brown stool.  OB negative.   EXTREMITIES:  2+ Peripheral Pulses, No clubbing, cyanosis, or edema  LYMPH: No lymphadenopathy noted  SKIN: No rashes or lesions      LABS:  Recent CBC; CMP, LFT's:  Normal.      EKG:  NSR  Normal.      RADIOLOGY & ADDITIONAL STUDIES:  CXR:  Neg.    CT scan:  HH, Large residual fecal residue in the recto-Sigmoid colon.  Mild rectal wall thickening. Small amount of gila-rectal inflammation  Bladder wall thickening.

## 2019-08-21 LAB
APPEARANCE UR: CLEAR — SIGNIFICANT CHANGE UP
BACTERIA # UR AUTO: ABNORMAL
BILIRUB UR-MCNC: NEGATIVE — SIGNIFICANT CHANGE UP
COLOR SPEC: YELLOW — SIGNIFICANT CHANGE UP
DIFF PNL FLD: ABNORMAL
EPI CELLS # UR: SIGNIFICANT CHANGE UP
GLUCOSE UR QL: NEGATIVE MG/DL — SIGNIFICANT CHANGE UP
KETONES UR-MCNC: NEGATIVE — SIGNIFICANT CHANGE UP
LEUKOCYTE ESTERASE UR-ACNC: NEGATIVE — SIGNIFICANT CHANGE UP
NITRITE UR-MCNC: NEGATIVE — SIGNIFICANT CHANGE UP
PH UR: 5 — SIGNIFICANT CHANGE UP (ref 5–8)
PROT UR-MCNC: NEGATIVE MG/DL — SIGNIFICANT CHANGE UP
RBC CASTS # UR COMP ASSIST: SIGNIFICANT CHANGE UP /HPF (ref 0–4)
SP GR SPEC: 1.01 — SIGNIFICANT CHANGE UP (ref 1.01–1.02)
UROBILINOGEN FLD QL: NEGATIVE MG/DL — SIGNIFICANT CHANGE UP
WBC UR QL: SIGNIFICANT CHANGE UP

## 2019-08-21 PROCEDURE — 74018 RADEX ABDOMEN 1 VIEW: CPT | Mod: 26

## 2019-08-21 PROCEDURE — 99232 SBSQ HOSP IP/OBS MODERATE 35: CPT | Mod: GC

## 2019-08-21 RX ORDER — LACTULOSE 10 G/15ML
20 SOLUTION ORAL THREE TIMES A DAY
Refills: 0 | Status: DISCONTINUED | OUTPATIENT
Start: 2019-08-21 | End: 2019-08-23

## 2019-08-21 RX ORDER — SACCHAROMYCES BOULARDII 250 MG
250 POWDER IN PACKET (EA) ORAL
Refills: 0 | Status: DISCONTINUED | OUTPATIENT
Start: 2019-08-21 | End: 2019-08-23

## 2019-08-21 RX ADMIN — ATORVASTATIN CALCIUM 40 MILLIGRAM(S): 80 TABLET, FILM COATED ORAL at 22:15

## 2019-08-21 RX ADMIN — Medication 250 MILLIGRAM(S): at 17:49

## 2019-08-21 RX ADMIN — Medication 133 MILLILITER(S): at 05:03

## 2019-08-21 RX ADMIN — Medication 10 MILLIGRAM(S): at 12:11

## 2019-08-21 RX ADMIN — HEPARIN SODIUM 5000 UNIT(S): 5000 INJECTION INTRAVENOUS; SUBCUTANEOUS at 17:50

## 2019-08-21 RX ADMIN — LACTULOSE 20 GRAM(S): 10 SOLUTION ORAL at 13:57

## 2019-08-21 RX ADMIN — Medication 81 MILLIGRAM(S): at 12:11

## 2019-08-21 RX ADMIN — POLYETHYLENE GLYCOL 3350 17 GRAM(S): 17 POWDER, FOR SOLUTION ORAL at 12:11

## 2019-08-21 RX ADMIN — PIPERACILLIN AND TAZOBACTAM 25 GRAM(S): 4; .5 INJECTION, POWDER, LYOPHILIZED, FOR SOLUTION INTRAVENOUS at 22:15

## 2019-08-21 RX ADMIN — Medication 100 MILLIGRAM(S): at 05:14

## 2019-08-21 RX ADMIN — HEPARIN SODIUM 5000 UNIT(S): 5000 INJECTION INTRAVENOUS; SUBCUTANEOUS at 05:14

## 2019-08-21 RX ADMIN — SENNA PLUS 2 TABLET(S): 8.6 TABLET ORAL at 22:15

## 2019-08-21 RX ADMIN — Medication 100 MILLIGRAM(S): at 17:49

## 2019-08-21 RX ADMIN — PIPERACILLIN AND TAZOBACTAM 25 GRAM(S): 4; .5 INJECTION, POWDER, LYOPHILIZED, FOR SOLUTION INTRAVENOUS at 13:57

## 2019-08-21 RX ADMIN — PIPERACILLIN AND TAZOBACTAM 25 GRAM(S): 4; .5 INJECTION, POWDER, LYOPHILIZED, FOR SOLUTION INTRAVENOUS at 05:03

## 2019-08-21 RX ADMIN — LACTULOSE 20 GRAM(S): 10 SOLUTION ORAL at 05:14

## 2019-08-21 NOTE — PROGRESS NOTE ADULT - ASSESSMENT
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 Per family, he hasn't been eating well x 3 day and they have noticed that when he tries to intake solids, he vomits or seems to have a difficulty time swallowing. Found to have large amount of fecal matter in the rectum with likely stercoral colitis. Patient evaluated by speech and swallow, recommended soft mechanical diet/ thin liquids. Is tolerating well. Will continue with lactulose/ bowel regimen. Increased dose of lactulose to 20mg TID. AM Abd XR showed mod amt of fecal material in colon, greater in the R hemicolon and transverse colon. On yesterdays abd XR, moderated fecal colon greater in the R hemicolon and transverse colon. Dr. Yang attempted fecal disimpaction with no success Recommended magnesium citrate and if that does not work give lactulose 30mg q4 hrs standing.      Decreased PO intake likely 2/2 stercoral colitis and retained stool seen on CT  -speech and swallow recs appreciated, tolerating mechanical soft w/ thin liquids  -s/p dulcolax/fleet, and lactulose, had 2 large BMs yesterday.  -Abd XR 8/18: Moderate amount retained fecal material in the colon, greatest in the right hemicolon and transverse colon.    - XR 8/19: moderated fecal colon greater in the R hemicolon and transverse colon  - GI recs appreciated. Dr. Yang attempted fecal disimpaction with no success Recommeded magnesium citrate and if necessary, lactulose 30mg q4 hrs standing. (held because pt s/p code sepsis for R temp or 101.6 and , likely stercoral colitis)  -S/p 2 BMs this afternoon. c/w lactulose, c/w bowel regimen   - f/u repeat Abd XR after BM.  -c/w zosyn  f/u am abdominal xray  f/u BCx    Dysphagia (likely 2/2 dementia)  -speech and swallow evaluation appreciated  -tolerating mech soft with thin liquids.     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 Mother wants him to be alive and well . PT eval pending

## 2019-08-21 NOTE — PROGRESS NOTE ADULT - ATTENDING COMMENTS
Patient seen and examined at the bedside. I was physically present for key portions of the evaluation and management services provided. Agree with the above history, physical, assessment, and plan with the necessary amendments/elaborations below:    clinically stable. pt w/ BM overnight but fu abd imaging still with significant amount of retained stool. cont current management, add lactulose tid, cont stool count. repeat abd xray in am, plan for dc home after good amount of bowel movements and relief of constipation noted. likely dc in 24-48hrs
Patient seen and examined at the bedside. I was physically present for key portions of the evaluation and management services provided. Agree with the above history, physical, assessment, and plan with the necessary amendments/elaborations below:    clinically stable. sp several large BMs, repaet abd xray this am w/ resolution of fecal impaction, stercoral colitis resolved. reduce bowel regimen by changing lactulose to prn, cont all other meds as written now on dc.     noted to be code sepsis yesterday, cont empiric abx until blood cx resulted, rest of w/u negative, if neg tomorrow plan for dc home w/o abx.
Patient seen and examined at the bedside. I was physically present for key portions of the evaluation and management services provided. Agree with the above history, physical, assessment, and plan with the necessary amendments/elaborations below:    clinical stable. stercoral colitis likely sec to fecal impaction. on bowel regimen, continue, add miralax, stool count. slp eval appreciated, passed, mech soft w/ thins, supervise + assist all meals. agree w/ holding abx, no clear indication currently, monitor for leukocytosis and/or fever, worsening abd symptoms and resume if deemed necessary.
Patient seen and examined at the bedside. I was physically present for key portions of the evaluation and management services provided. Agree with the above history, physical, assessment, and plan with the necessary amendments/elaborations below:    clinically stable. pt w/ BM overnight but fu abd imaging still with significant amount of retained stool. cont current management, add lactulose tid, cont stool count. repeat abd xray in am, plan for dc home after good amount of bowel movements and relief of constipation noted. likely dc in 24-48hrs

## 2019-08-22 LAB
ANION GAP SERPL CALC-SCNC: 11 MMOL/L — SIGNIFICANT CHANGE UP (ref 5–17)
BASOPHILS # BLD AUTO: 0.05 K/UL — SIGNIFICANT CHANGE UP (ref 0–0.2)
BASOPHILS NFR BLD AUTO: 0.5 % — SIGNIFICANT CHANGE UP (ref 0–2)
BUN SERPL-MCNC: 13 MG/DL — SIGNIFICANT CHANGE UP (ref 8–20)
CALCIUM SERPL-MCNC: 8.6 MG/DL — SIGNIFICANT CHANGE UP (ref 8.6–10.2)
CHLORIDE SERPL-SCNC: 107 MMOL/L — SIGNIFICANT CHANGE UP (ref 98–107)
CO2 SERPL-SCNC: 21 MMOL/L — LOW (ref 22–29)
CREAT SERPL-MCNC: 1.08 MG/DL — SIGNIFICANT CHANGE UP (ref 0.5–1.3)
EOSINOPHIL # BLD AUTO: 0.78 K/UL — HIGH (ref 0–0.5)
EOSINOPHIL NFR BLD AUTO: 7.3 % — HIGH (ref 0–6)
GLUCOSE SERPL-MCNC: 106 MG/DL — SIGNIFICANT CHANGE UP (ref 70–115)
HCT VFR BLD CALC: 42.3 % — SIGNIFICANT CHANGE UP (ref 39–50)
HGB BLD-MCNC: 13.5 G/DL — SIGNIFICANT CHANGE UP (ref 13–17)
IMM GRANULOCYTES NFR BLD AUTO: 0.5 % — SIGNIFICANT CHANGE UP (ref 0–1.5)
LYMPHOCYTES # BLD AUTO: 1.34 K/UL — SIGNIFICANT CHANGE UP (ref 1–3.3)
LYMPHOCYTES # BLD AUTO: 12.5 % — LOW (ref 13–44)
MAGNESIUM SERPL-MCNC: 2 MG/DL — SIGNIFICANT CHANGE UP (ref 1.6–2.6)
MCHC RBC-ENTMCNC: 29.5 PG — SIGNIFICANT CHANGE UP (ref 27–34)
MCHC RBC-ENTMCNC: 31.9 GM/DL — LOW (ref 32–36)
MCV RBC AUTO: 92.4 FL — SIGNIFICANT CHANGE UP (ref 80–100)
MONOCYTES # BLD AUTO: 1.03 K/UL — HIGH (ref 0–0.9)
MONOCYTES NFR BLD AUTO: 9.6 % — SIGNIFICANT CHANGE UP (ref 2–14)
NEUTROPHILS # BLD AUTO: 7.49 K/UL — HIGH (ref 1.8–7.4)
NEUTROPHILS NFR BLD AUTO: 69.6 % — SIGNIFICANT CHANGE UP (ref 43–77)
PHOSPHATE SERPL-MCNC: 2.8 MG/DL — SIGNIFICANT CHANGE UP (ref 2.4–4.7)
PLATELET # BLD AUTO: 219 K/UL — SIGNIFICANT CHANGE UP (ref 150–400)
POTASSIUM SERPL-MCNC: 4.5 MMOL/L — SIGNIFICANT CHANGE UP (ref 3.5–5.3)
POTASSIUM SERPL-SCNC: 4.5 MMOL/L — SIGNIFICANT CHANGE UP (ref 3.5–5.3)
RBC # BLD: 4.58 M/UL — SIGNIFICANT CHANGE UP (ref 4.2–5.8)
RBC # FLD: 12.2 % — SIGNIFICANT CHANGE UP (ref 10.3–14.5)
SODIUM SERPL-SCNC: 139 MMOL/L — SIGNIFICANT CHANGE UP (ref 135–145)
WBC # BLD: 10.74 K/UL — HIGH (ref 3.8–10.5)
WBC # FLD AUTO: 10.74 K/UL — HIGH (ref 3.8–10.5)

## 2019-08-22 PROCEDURE — 99232 SBSQ HOSP IP/OBS MODERATE 35: CPT | Mod: GC

## 2019-08-22 RX ADMIN — Medication 100 MILLIGRAM(S): at 18:14

## 2019-08-22 RX ADMIN — HEPARIN SODIUM 5000 UNIT(S): 5000 INJECTION INTRAVENOUS; SUBCUTANEOUS at 06:01

## 2019-08-22 RX ADMIN — HEPARIN SODIUM 5000 UNIT(S): 5000 INJECTION INTRAVENOUS; SUBCUTANEOUS at 18:14

## 2019-08-22 RX ADMIN — PIPERACILLIN AND TAZOBACTAM 25 GRAM(S): 4; .5 INJECTION, POWDER, LYOPHILIZED, FOR SOLUTION INTRAVENOUS at 06:01

## 2019-08-22 RX ADMIN — Medication 250 MILLIGRAM(S): at 06:01

## 2019-08-22 RX ADMIN — ATORVASTATIN CALCIUM 40 MILLIGRAM(S): 80 TABLET, FILM COATED ORAL at 22:33

## 2019-08-22 RX ADMIN — Medication 100 MILLIGRAM(S): at 06:01

## 2019-08-22 RX ADMIN — SENNA PLUS 2 TABLET(S): 8.6 TABLET ORAL at 22:31

## 2019-08-22 RX ADMIN — Medication 10 MILLIGRAM(S): at 14:34

## 2019-08-22 RX ADMIN — Medication 133 MILLILITER(S): at 06:32

## 2019-08-22 RX ADMIN — PIPERACILLIN AND TAZOBACTAM 25 GRAM(S): 4; .5 INJECTION, POWDER, LYOPHILIZED, FOR SOLUTION INTRAVENOUS at 14:34

## 2019-08-22 RX ADMIN — POLYETHYLENE GLYCOL 3350 17 GRAM(S): 17 POWDER, FOR SOLUTION ORAL at 14:34

## 2019-08-22 RX ADMIN — Medication 250 MILLIGRAM(S): at 18:14

## 2019-08-22 RX ADMIN — Medication 81 MILLIGRAM(S): at 14:34

## 2019-08-22 NOTE — PROGRESS NOTE ADULT - SUBJECTIVE AND OBJECTIVE BOX
Patient is a 59y old  Male who presents with a chief complaint of decreased oral intake and inability to swallow fluids (20 Aug 2019 19:38)    INTERVAL/OVERNIGHT EVENTS:  Patient seen and examined at bedside. As per nurse, patient had 2 BMs yesterday but none overnight. Is tolerating mechanical soft diet. Voiding appropriately. No episodes of fever.    Vital Signs Last 24 Hrs  T(C): 36.9 (22 Aug 2019 08:04), Max: 36.9 (21 Aug 2019 22:36)  T(F): 98.4 (22 Aug 2019 08:04), Max: 98.5 (21 Aug 2019 22:36)  HR: 84 (22 Aug 2019 08:04) (84 - 113)  BP: 135/88 (22 Aug 2019 08:04) (131/85 - 146/92)  RR: 20 (22 Aug 2019 08:04) (18 - 20)  SpO2: 95% (21 Aug 2019 22:36) (95% - 98%)    GENERAL: Appears well developed, well groomed, well nourished alert and cooperative.  HEENT: EOMI, Moist oral mucosa.   CARDIOVASCULAR: Normal S1 and S2, No murmur, RRR. No edema  RESPIRATORY: No rales, rhonchi or wheeze. Normal breath sounds bilaterally.  GASTROINTESTINAL: Soft, nontender without mass or organomegaly. Bowel sounds present on 4 quadrants.   EXTREMITIES: No clubbing, cyanosis or edema. No calf tenderness. Distal pulses wnl.   SKIN: warm and dry with normal turgor.  NEURO: Alert. Aphasic, non cooperative.                           13.5   10.74 )-----------( 219      ( 22 Aug 2019 07:22 )             42.3     08-22    139  |  107  |  13.0  ----------------------------<  106  4.5   |  21.0<L>  |  1.08    Ca    8.6      22 Aug 2019 07:22  Phos  2.8     08-22  Mg     2.0     08-22    TPro  7.4  /  Alb  3.7  /  TBili  0.3<L>  /  DBili  x   /  AST  16  /  ALT  19  /  AlkPhos  91  08-20    MEDICATIONS  (STANDING):  aspirin  chewable 81 milliGRAM(s) Oral daily  atorvastatin 40 milliGRAM(s) Oral at bedtime  bisacodyl Suppository 10 milliGRAM(s) Rectal daily  docusate sodium 100 milliGRAM(s) Oral two times a day  heparin  Injectable 5000 Unit(s) SubCutaneous every 12 hours  piperacillin/tazobactam IVPB.. 3.375 Gram(s) IV Intermittent every 8 hours  polyethylene glycol 3350 17 Gram(s) Oral daily  saccharomyces boulardii 250 milliGRAM(s) Oral two times a day  senna 2 Tablet(s) Oral at bedtime    MEDICATIONS  (PRN):  acetaminophen   Tablet .. 650 milliGRAM(s) Oral every 6 hours PRN Temp greater or equal to 38C (100.4F), Mild Pain (1 - 3)  lactulose Syrup 20 Gram(s) Oral three times a day PRN constipation  ondansetron Injectable 4 milliGRAM(s) IV Push every 6 hours PRN Nausea
Patient is a 59y old  Male who presents with a chief complaint of decreased oral intake and inability to swallow fluids (20 Aug 2019 19:38)    INTERVAL/OVERNIGHT EVENTS:  Patient seen and examined at bedside. As per nurse, patient has been having BMs overnight, 2 med-large BMs yesterday and 3 large loose BMs overnight. Patient remained afebrile overnight, s/p code sepsis yesterday for fever, tachycardia, now on zosyn to cover for intra abdominal infection. Tolerating mechanical soft diet.     V/S:  T(F): 98.1 (21 Aug 2019 07:50), Max: 101.6 (20 Aug 2019 18:05)  HR: 94 (21 Aug 2019 07:50) (94 - 115)  BP: 133/94 (21 Aug 2019 07:50) (126/80 - 153/94)  BP(mean): 99 (20 Aug 2019 19:30) (95 - 99)  RR: 18 (21 Aug 2019 07:50) (18 - 18)  SpO2: 96% (21 Aug 2019 07:50) (96% - 100%)    GENERAL: Appears well developed, well groomed, well nourished alert and cooperative.  HEENT: EOMI, Moist oral mucosa.   CARDIOVASCULAR: Normal S1 and S2, No murmur, RRR. No edema  RESPIRATORY: No rales, rhonchi or wheeze. Normal breath sounds bilaterally.  GASTROINTESTINAL: Soft, nontender without mass or organomegaly. Bowel sounds present on 4 quadrants.   EXTREMITIES: No clubbing, cyanosis or edema. No calf tenderness. Distal pulses wnl.   SKIN: warm and dry with normal turgor.  NEURO: Alert. Aphasic, non cooperative.     LABS                          15.6   13.05 )-----------( 280      ( 20 Aug 2019 18:23 )             46.7         08-20    138  |  103  |  13.0  ----------------------------<  158<H>  4.0   |  22.0  |  1.28    Ca    9.8      20 Aug 2019 18:23    TPro  7.4  /  Alb  3.7  /  TBili  0.3<L>  /  DBili  x   /  AST  16  /  ALT  19  /  AlkPhos  91  08-20    LIVER FUNCTIONS - ( 20 Aug 2019 18:23 )  Alb: 3.7 g/dL / Pro: 7.4 g/dL / ALK PHOS: 91 U/L / ALT: 19 U/L / AST: 16 U/L / GGT: x           PT/INR - ( 20 Aug 2019 18:23 )   PT: 13.3 sec;   INR: 1.15 ratio    PTT - ( 20 Aug 2019 18:23 )  PTT:39.6 sec    IMAGING  DIET:    MEDICATIONS  (STANDING):  aspirin  chewable 81 milliGRAM(s) Oral daily  atorvastatin 40 milliGRAM(s) Oral at bedtime  bisacodyl Suppository 10 milliGRAM(s) Rectal daily  docusate sodium 100 milliGRAM(s) Oral two times a day  heparin  Injectable 5000 Unit(s) SubCutaneous every 12 hours  lactulose Syrup 20 Gram(s) Oral every 8 hours  mineral oil enema 133 milliLiter(s) Rectal every 12 hours  piperacillin/tazobactam IVPB.. 3.375 Gram(s) IV Intermittent every 8 hours  polyethylene glycol 3350 17 Gram(s) Oral daily  saccharomyces boulardii 250 milliGRAM(s) Oral two times a day  senna 2 Tablet(s) Oral at bedtime    MEDICATIONS  (PRN):  acetaminophen   Tablet .. 650 milliGRAM(s) Oral every 6 hours PRN Temp greater or equal to 38C (100.4F), Mild Pain (1 - 3)  ondansetron Injectable 4 milliGRAM(s) IV Push every 6 hours PRN Nausea
Pt seen and examined at bedside. Pt aphasic 2/2 CVA in past. As per nurse, pt had 2 BM this afternoon after receiving mag citrate that were medium to large. Pt had code sepsis called in evening for Rectal temp of 101.6 and . Pt nonverbal but looked mildly uncomfortable. Nurse requested we not start lactulose 30mg q4 because pt was uncomfortable. Repeat abd XR in AM. Continues to tolerate his mechanical soft diet and drinking fluids well. No acute events ON. Has condom cath in place/actively voiding.    Vital Signs Last 24 Hrs  T(F): 101.6 (20 Aug 2019 18:05), Max: 101.6 (20 Aug 2019 18:05)  HR: 115 (20 Aug 2019 18:05) (74 - 115)  BP: 126/80 (20 Aug 2019 18:05) (110/81 - 153/94)  BP(mean): 95 (20 Aug 2019 18:05) (95 - 95)  RR: 18 (20 Aug 2019 18:05) (18 - 18)  SpO2: 100% (20 Aug 2019 18:05) (95% - 100%)    GENERAL: Appears well developed, well groomed, well nourished alert and cooperative.  HEENT: Head; normocephalic, atraumatic, PERRLA, EOMI. Moist oral mucosa.   NECK: Supple.   CARDIOVASCULAR: Normal S1 and S2, No murmur, RRR. No edema  RESPIRATORY: No rales, rhonchi or wheeze. Normal breath sounds bilaterally.  GASTROINTESTINAL: Soft, nontender without mass or organomegaly. Bowel sounds present on 4 quadrants.   EXTREMITIES: No clubbing, cyanosis or edema. No calf tenderness. Distal pulses wnl.   MUSCULOSKELETAL: 5/5 muscle strength in UE and LE.   SKIN: warm and dry with normal turgor.  NEURO: Alert. Aphasic, non cooperative.   PSYCH: normal affect.                          15.0   6.97  )-----------( 180      ( 17 Aug 2019 12:05 )             46.0     08-17    139  |  104  |  14.0  ----------------------------<  79  4.1   |  24.0  |  0.92    Ca    9.3      17 Aug 2019 12:05  Phos  3.0     08-17  Mg     1.9     08-17    TPro  7.6  /  Alb  4.0  /  TBili  0.5  /  DBili  x   /  AST  14  /  ALT  15  /  AlkPhos  88  08-17    MEDICATIONS  (STANDING):  aspirin  chewable 81 milliGRAM(s) Oral daily  atorvastatin 40 milliGRAM(s) Oral at bedtime  bisacodyl Suppository 10 milliGRAM(s) Rectal daily  docusate sodium 100 milliGRAM(s) Oral two times a day  heparin  Injectable 5000 Unit(s) SubCutaneous every 12 hours  lactulose Syrup 20 Gram(s) Oral every 8 hours  mineral oil enema 133 milliLiter(s) Rectal every 12 hours  piperacillin/tazobactam IVPB. 3.375 Gram(s) IV Intermittent once  piperacillin/tazobactam IVPB.. 3.375 Gram(s) IV Intermittent every 8 hours  polyethylene glycol 3350 17 Gram(s) Oral daily  senna 2 Tablet(s) Oral at bedtime    MEDICATIONS  (PRN):  acetaminophen   Tablet .. 650 milliGRAM(s) Oral every 6 hours PRN Temp greater or equal to 38C (100.4F), Mild Pain (1 - 3)  ondansetron Injectable 4 milliGRAM(s) IV Push every 6 hours PRN Nausea
Pt seen and examined at bedside. Pt aphasic 2/2 CVA in past. As per nurse, pt had 2 BMs yesterday but none overnight despite increasing dose of lactulose. Continues to tolerate his mechanical soft diet and drinking fluids well. No acute events ON. Has condom cath in place/actively voiding.    Vital Signs Last 24 Hrs  T(C): 36.7 (19 Aug 2019 09:18), Max: 37.1 (18 Aug 2019 16:37)  T(F): 98.1 (19 Aug 2019 09:18), Max: 98.7 (18 Aug 2019 16:37)  HR: 78 (19 Aug 2019 09:18) (57 - 78)  BP: 114/77 (19 Aug 2019 09:18) (114/77 - 125/86)  RR: 18 (19 Aug 2019 09:18) (18 - 18)  SpO2: 98% (19 Aug 2019 09:18) (98% - 100%)    GENERAL: Appears well developed, well groomed, well nourished alert and cooperative.  HEENT: Head; normocephalic, atraumatic, PERRLA, EOMI. Moist oral mucosa.   NECK: Supple.   CARDIOVASCULAR: Normal S1 and S2, No murmur, RRR. No edema  RESPIRATORY: No rales, rhonchi or wheeze. Normal breath sounds bilaterally.  GASTROINTESTINAL: Soft, nontender without mass or organomegaly. Bowel sounds present on 4 quadrants.   EXTREMITIES: No clubbing, cyanosis or edema. No calf tenderness. Distal pulses wnl.   MUSCULOSKELETAL: 5/5 muscle strength in UE and LE.   SKIN: warm and dry with normal turgor.  NEURO: Alert. Aphasic, non cooperative.   PSYCH: normal affect.                          15.0   6.97  )-----------( 180      ( 17 Aug 2019 12:05 )             46.0     08-17    139  |  104  |  14.0  ----------------------------<  79  4.1   |  24.0  |  0.92    Ca    9.3      17 Aug 2019 12:05  Phos  3.0     08-17  Mg     1.9     08-17    TPro  7.6  /  Alb  4.0  /  TBili  0.5  /  DBili  x   /  AST  14  /  ALT  15  /  AlkPhos  88  08-17    MEDICATIONS  (STANDING):  aspirin  chewable 81 milliGRAM(s) Oral daily  atorvastatin 40 milliGRAM(s) Oral at bedtime  bisacodyl Suppository 10 milliGRAM(s) Rectal daily  docusate sodium 100 milliGRAM(s) Oral two times a day  heparin  Injectable 5000 Unit(s) SubCutaneous every 12 hours  lactulose Syrup 20 Gram(s) Oral every 8 hours  polyethylene glycol 3350 17 Gram(s) Oral daily  senna 2 Tablet(s) Oral at bedtime    MEDICATIONS  (PRN):  acetaminophen   Tablet .. 650 milliGRAM(s) Oral every 6 hours PRN Temp greater or equal to 38C (100.4F), Mild Pain (1 - 3)  ondansetron Injectable 4 milliGRAM(s) IV Push every 6 hours PRN Nausea
Pt seen and examined at bedside. Pt aphasic 2/2 CVA in past. As per nurse, pt had a BM at 10PM and has been tolerating his mechanical soft diet and drinking fluids well. No acute events ON. Has condom cath in place and voided 850cc ON.     Vital Signs Last 24 Hrs  T(F): 98 (18 Aug 2019 08:11), Max: 98 (17 Aug 2019 17:20)  HR: 57 (18 Aug 2019 08:11) (57 - 67)  BP: 126/83 (18 Aug 2019 08:11) (109/71 - 134/80)  RR: 18 (18 Aug 2019 08:11) (18 - 20)  SpO2: 95% (18 Aug 2019 08:11) (95% - 96%)    GENERAL: Appears well developed, well nourished alert and cooperative.  HEENT: Head; normocephalic, atraumatic, PERRLA, EOMI. Moist oral mucosa.   NECK: Supple.   CARDIOVASCULAR: Normal S1 and S2, No murmur, RRR. No edema  RESPIRATORY: No rales, rhonchi or wheeze. Normal breath sounds bilaterally.  GASTROINTESTINAL: Soft, nontender without mass or organomegaly. Bowel sounds present on 4 quadrants.   EXTREMITIES: No clubbing, cyanosis or edema. No calf tenderness. Distal pulses wnl.   MUSCULOSKELETAL: 5/5 muscle strength in UE and LE.   SKIN: warm and dry with normal turgor.  NEURO: Alert. Aphasic, non cooperative.   PSYCH: normal affect.                          15.0   6.97  )-----------( 180      ( 17 Aug 2019 12:05 )             46.0     08-17    139  |  104  |  14.0  ----------------------------<  79  4.1   |  24.0  |  0.92    Ca    9.3      17 Aug 2019 12:05  Phos  3.0     08-17  Mg     1.9     08-17    TPro  7.6  /  Alb  4.0  /  TBili  0.5  /  DBili  x   /  AST  14  /  ALT  15  /  AlkPhos  88  08-17    MEDICATIONS  (STANDING):  aspirin  chewable 81 milliGRAM(s) Oral daily  atorvastatin 40 milliGRAM(s) Oral at bedtime  bisacodyl Suppository 10 milliGRAM(s) Rectal daily  docusate sodium 100 milliGRAM(s) Oral two times a day  heparin  Injectable 5000 Unit(s) SubCutaneous every 12 hours  lactulose Syrup 10 Gram(s) Oral every 8 hours  polyethylene glycol 3350 17 Gram(s) Oral daily  senna 2 Tablet(s) Oral at bedtime    MEDICATIONS  (PRN):  acetaminophen   Tablet .. 650 milliGRAM(s) Oral every 6 hours PRN Temp greater or equal to 38C (100.4F), Mild Pain (1 - 3)  ondansetron Injectable 4 milliGRAM(s) IV Push every 6 hours PRN Nausea
59y Male,   Patient is a 59y old  Male who presents with a chief complaint of decreased oral intake and inability to swallow fluids (16 Aug 2019 20:19)        INTERVAL/OVERNIGHT EVENTS:    Patient examined at beside. No acute events over night.  Nurse states patient has a rash on diaper area and Thighs b/l  Unable to obtain ROS as patient is non verbal.       I&O's Summary    17 Aug 2019 07:01  -  17 Aug 2019 14:17  --------------------------------------------------------  IN: 700 mL / OUT: 0 mL / NET: 700 mL      CAPILLARY BLOOD GLUCOSE  POCT Blood Glucose.: 79 mg/dL (17 Aug 2019 12:18)  POCT Blood Glucose.: 81 mg/dL (17 Aug 2019 12:16)  POCT Blood Glucose.: 61 mg/dL (17 Aug 2019 12:14)  POCT Blood Glucose.: 68 mg/dL (17 Aug 2019 12:12)      T(C): 36.7 (08-17-19 @ 08:54), Max: 37.2 (08-17-19 @ 00:18)  HR: 62 (08-17-19 @ 08:54) (55 - 65)  BP: 129/86 (08-17-19 @ 08:54) (96/47 - 129/86)  RR: 18 (08-17-19 @ 08:54) (18 - 20)  SpO2: 97% (08-17-19 @ 08:54) (97% - 98%)    PHYSICAL EXAM  GENERAL: Appears well developed, well nourished alert and cooperative.  HEENT: Head; normocephalic, atraumatic, PERRLA, EOMI. Moist oral mucosa.   NECK: Supple.   CARDIOVASCULAR: Normal S1 and S2, No murmur, RRR. No edema  RESPIRATORY: No rales, rhonchi or wheeze. Normal breath sounds bilaterally.  GASTROINTESTINAL: Soft, nontender without mass or organomegaly. Bowel sounds present on 4 quadrants.   EXTREMITIES: No clubbing, cyanosis or edema. No calf tenderness. Distal pulses wnl.   MUSCULOSKELETAL: 5/5 muscle strength in UE and LE.   SKIN: warm and dry with normal turgor.  NEURO: Alert. Aphasic, non cooperative.   PSYCH: normal affect.    LABS                          15.0   6.97  )-----------( 180      ( 17 Aug 2019 12:05 )             46.0         08-17    139  |  104  |  14.0  ----------------------------<  79  4.1   |  24.0  |  0.92    Ca    9.3      17 Aug 2019 12:05  Phos  3.0     08-17  Mg     1.9     08-17    TPro  7.6  /  Alb  4.0  /  TBili  0.5  /  DBili  x   /  AST  14  /  ALT  15  /  AlkPhos  88  08-17        LIVER FUNCTIONS - ( 17 Aug 2019 12:05 )  Alb: 4.0 g/dL / Pro: 7.6 g/dL / ALK PHOS: 88 U/L / ALT: 15 U/L / AST: 14 U/L / GGT: x               MEDICATIONS  (STANDING):  heparin  Injectable 5000 Unit(s) SubCutaneous every 12 hours  multiple electrolytes Injection Type 1 1000 milliLiter(s) (100 mL/Hr) IV Continuous <Continuous>    MEDICATIONS  (PRN):  acetaminophen   Tablet .. 650 milliGRAM(s) Oral every 6 hours PRN Temp greater or equal to 38C (100.4F), Mild Pain (1 - 3)  bisacodyl Suppository 10 milliGRAM(s) Rectal once PRN Constipation  ondansetron Injectable 4 milliGRAM(s) IV Push every 6 hours PRN Nausea

## 2019-08-22 NOTE — CHART NOTE - NSCHARTNOTEFT_GEN_A_CORE
Patient seen and examined at the bedside with resident team. I was physically present for the key portions of the evaluation and management services provided.  history, physical, assessment, and plan per their note dated for today with the necessary amendments/elaborations below:    clinically stable. pt unable to communicate verbally but appears comfortable, non toxic, no signs/symptoms of distress during examination. fecal impaction/stercoral colitis resolved. cont aggressive bowel regimen on dc, high fiber diet, outpt gi fu for ongoing management to prevent recurrence.    regarding code sepsis, thus far w/u remains unremarkable, no recurrent s/s of active infection since that time, leukocytosis trending down on abx. pending blood cx x2 as final portion of w/u, if unremkarble trial off abx, if afebrile x24hrs after that will dc home w/o active treatment. if cx positive will w/u and manage as indicated.     PT larryal appreciated. has 24hr care at home. likely dc in 24hrs.

## 2019-08-22 NOTE — PROGRESS NOTE ADULT - ASSESSMENT
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 Per family, he hasn't been eating well x 3 day and they have noticed that when he tries to intake solids, he vomits or seems to have a difficulty time swallowing. Found to have large amount of fecal matter in the rectum with likely stercoral colitis. Patient evaluated by speech and swallow, recommended soft mechanical diet/ thin liquids. Is tolerating well. Will continue with lactulose/ bowel regimen. Increased dose of lactulose to 20mg TID. AM Abd XR showed mod amt of fecal material in colon, greater in the R hemicolon and transverse colon. On yesterdays abd XR, moderated fecal colon greater in the R hemicolon and transverse colon. Dr. Yang attempted fecal disimpaction with no success Recommended magnesium citrate and if that does not work give lactulose 30mg q4 hrs standing. Pt 3 BMs on 8/20 overnight. Repeat abd XR after BMs on 8/21 shows fecal impaction resolved. Stercoral colitis resolved. Pt had another 2 BMs on 8/21 afternoon.  Will continue aggressive bowel regimen on dc, high fiber diet, and recommend outpt gi fu for ongoing management to prevent recurrence. F/u BCx, likely discharge in 24 hrs.    Decreased PO intake likely 2/2 stercoral colitis and retained stool seen on CT  -speech and swallow recs appreciated, tolerating mechanical soft w/ thin liquids  -Abd XR 8/18: Moderate amount retained fecal material in the colon, greatest in the right hemicolon and transverse colon.    - XR 8/19: moderated fecal colon greater in the R hemicolon and transverse colon  - GI recs appreciated. Dr. Yang attempted fecal disimpaction with no success Recommeded magnesium citrate and if necessary, lactulose 30mg q4 hrs standing. (held because pt s/p code sepsis for R temp or 101.6 and , likely stercoral colitis)  -S/p 2 BMs yesterday. c/w lactulose, c/w bowel regimen   -s/p zosyn  -repeat abd XR after 2 BMs from 8/21: fecal impaction resolved. stercoral colitis resolved.   -cont aggressive bowel regimen on dc, high fiber diet, outpt gi fu for ongoing management to prevent recurrence.  -f/u BCx, likely discharge in 24 hrs    Dysphagia (likely 2/2 dementia)  -speech and swallow evaluation appreciated  -tolerating mech soft with thin liquids.     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 Mother wants him to be alive and well . Likely discharge in 24 hrs. F/u pending BCx, Patient is a 60 y/o male with pmhx of CVA 8 years ago leaving him aphasic and testicular cancer 10 years ago s/p unilateral orchiectomy was brought in to the ER by mother/aide and brother for decreased oral intake. Per family, he hasn't been eating well x 3 day and they have noticed that when he tries to intake solids, he vomits or seems to have a difficulty time swallowing. Found to have large amount of fecal matter in the rectum with likely stercoral colitis. Patient evaluated by speech and swallow, recommended soft mechanical diet/ thin liquids. Is tolerating well. Will continue with lactulose/ bowel regimen. Increased dose of lactulose to 20mg TID. AM Abd XR showed mod amt of fecal material in colon, greater in the R hemicolon and transverse colon. On yesterdays abd XR, moderated fecal colon greater in the R hemicolon and transverse colon. Dr. Yang attempted fecal disimpaction with no success Recommended magnesium citrate and if that does not work give lactulose 30mg q4 hrs standing. Pt 3 BMs on 8/20 overnight. Repeat abd XR after BMs on 8/21 shows fecal impaction resolved. Stercoral colitis resolved. Pt had another 2 BMs on 8/21 afternoon. Will continue aggressive bowel regimen on dc, high fiber diet, and recommend outpt gi fu for ongoing management to prevent recurrence. F/u BCx, likely discharge in 24 hrs.    Decreased PO intake likely 2/2 stercoral colitis and retained stool seen on CT  -speech and swallow recs appreciated, tolerating mechanical soft w/ thin liquids  -Abd XR 8/18: Moderate amount retained fecal material in the colon, greatest in the right hemicolon and transverse colon.    - XR 8/19: moderated fecal colon greater in the R hemicolon and transverse colon  - GI recs appreciated. Dr. Yang attempted fecal disimpaction with no success Recommeded magnesium citrate and if necessary, lactulose 30mg q4 hrs standing. (held because pt s/p code sepsis for R temp or 101.6 and , likely stercoral colitis)  -S/p 3 BMs on 8/20 and 2 BMs yesterday. c/w lactulose, c/w bowel regimen   -s/p zosyn x 2 days  -repeat abd XR after 2 BMs from 8/21: fecal impaction resolved. stercoral colitis resolved.   -cont aggressive bowel regimen on dc, high fiber diet, outpt gi fu for ongoing management to prevent recurrence.  -f/u BCx, likely discharge in 24 hrs    Dysphagia (likely 2/2 dementia)  -speech and swallow evaluation appreciated  -tolerating mech soft with thin liquids.     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 Mother wants him to be alive and well . Likely discharge in 24 hrs. F/u pending BCx,

## 2019-08-22 NOTE — PROGRESS NOTE ADULT - REASON FOR ADMISSION
decreased oral intake and inability to swallow fluids

## 2019-08-23 ENCOUNTER — APPOINTMENT (OUTPATIENT)
Dept: INTERNAL MEDICINE | Facility: CLINIC | Age: 59
End: 2019-08-23

## 2019-08-23 ENCOUNTER — TRANSCRIPTION ENCOUNTER (OUTPATIENT)
Age: 59
End: 2019-08-23

## 2019-08-23 VITALS
TEMPERATURE: 100 F | SYSTOLIC BLOOD PRESSURE: 144 MMHG | OXYGEN SATURATION: 94 % | RESPIRATION RATE: 18 BRPM | DIASTOLIC BLOOD PRESSURE: 94 MMHG | HEART RATE: 100 BPM

## 2019-08-23 LAB
BASOPHILS # BLD AUTO: 0.05 K/UL — SIGNIFICANT CHANGE UP (ref 0–0.2)
BASOPHILS NFR BLD AUTO: 0.5 % — SIGNIFICANT CHANGE UP (ref 0–2)
EOSINOPHIL # BLD AUTO: 0.88 K/UL — HIGH (ref 0–0.5)
EOSINOPHIL NFR BLD AUTO: 8.6 % — HIGH (ref 0–6)
HCT VFR BLD CALC: 41.3 % — SIGNIFICANT CHANGE UP (ref 39–50)
HGB BLD-MCNC: 13.3 G/DL — SIGNIFICANT CHANGE UP (ref 13–17)
IMM GRANULOCYTES NFR BLD AUTO: 0.5 % — SIGNIFICANT CHANGE UP (ref 0–1.5)
LYMPHOCYTES # BLD AUTO: 1.34 K/UL — SIGNIFICANT CHANGE UP (ref 1–3.3)
LYMPHOCYTES # BLD AUTO: 13 % — SIGNIFICANT CHANGE UP (ref 13–44)
MCHC RBC-ENTMCNC: 29.6 PG — SIGNIFICANT CHANGE UP (ref 27–34)
MCHC RBC-ENTMCNC: 32.2 GM/DL — SIGNIFICANT CHANGE UP (ref 32–36)
MCV RBC AUTO: 91.8 FL — SIGNIFICANT CHANGE UP (ref 80–100)
MONOCYTES # BLD AUTO: 1.13 K/UL — HIGH (ref 0–0.9)
MONOCYTES NFR BLD AUTO: 11 % — SIGNIFICANT CHANGE UP (ref 2–14)
NEUTROPHILS # BLD AUTO: 6.84 K/UL — SIGNIFICANT CHANGE UP (ref 1.8–7.4)
NEUTROPHILS NFR BLD AUTO: 66.4 % — SIGNIFICANT CHANGE UP (ref 43–77)
PLATELET # BLD AUTO: 237 K/UL — SIGNIFICANT CHANGE UP (ref 150–400)
RBC # BLD: 4.5 M/UL — SIGNIFICANT CHANGE UP (ref 4.2–5.8)
RBC # FLD: 12.3 % — SIGNIFICANT CHANGE UP (ref 10.3–14.5)
WBC # BLD: 10.29 K/UL — SIGNIFICANT CHANGE UP (ref 3.8–10.5)
WBC # FLD AUTO: 10.29 K/UL — SIGNIFICANT CHANGE UP (ref 3.8–10.5)

## 2019-08-23 PROCEDURE — 99239 HOSP IP/OBS DSCHRG MGMT >30: CPT

## 2019-08-23 RX ORDER — POLYETHYLENE GLYCOL 3350 17 G/17G
17 POWDER, FOR SOLUTION ORAL
Qty: 520 | Refills: 0
Start: 2019-08-23 | End: 2019-09-21

## 2019-08-23 RX ORDER — ASPIRIN/CALCIUM CARB/MAGNESIUM 324 MG
1 TABLET ORAL
Qty: 30 | Refills: 0
Start: 2019-08-23 | End: 2019-09-21

## 2019-08-23 RX ORDER — SENNA PLUS 8.6 MG/1
2 TABLET ORAL
Qty: 30 | Refills: 0
Start: 2019-08-23

## 2019-08-23 RX ORDER — ATORVASTATIN CALCIUM 80 MG/1
1 TABLET, FILM COATED ORAL
Qty: 30 | Refills: 0
Start: 2019-08-23 | End: 2019-09-21

## 2019-08-23 RX ORDER — DOCUSATE SODIUM 100 MG
1 CAPSULE ORAL
Qty: 60 | Refills: 0
Start: 2019-08-23 | End: 2019-09-21

## 2019-08-23 RX ORDER — NYSTATIN CREAM 100000 [USP'U]/G
1 CREAM TOPICAL
Refills: 0 | Status: DISCONTINUED | OUTPATIENT
Start: 2019-08-23 | End: 2019-08-23

## 2019-08-23 RX ORDER — LACTULOSE 10 G/15ML
30 SOLUTION ORAL
Qty: 2000 | Refills: 0
Start: 2019-08-23 | End: 2019-09-21

## 2019-08-23 RX ADMIN — Medication 100 MILLIGRAM(S): at 06:04

## 2019-08-23 RX ADMIN — NYSTATIN CREAM 1 APPLICATION(S): 100000 CREAM TOPICAL at 17:27

## 2019-08-23 RX ADMIN — Medication 10 MILLIGRAM(S): at 11:53

## 2019-08-23 RX ADMIN — HEPARIN SODIUM 5000 UNIT(S): 5000 INJECTION INTRAVENOUS; SUBCUTANEOUS at 17:27

## 2019-08-23 RX ADMIN — Medication 250 MILLIGRAM(S): at 17:27

## 2019-08-23 RX ADMIN — Medication 100 MILLIGRAM(S): at 17:27

## 2019-08-23 RX ADMIN — Medication 250 MILLIGRAM(S): at 06:04

## 2019-08-23 RX ADMIN — POLYETHYLENE GLYCOL 3350 17 GRAM(S): 17 POWDER, FOR SOLUTION ORAL at 11:53

## 2019-08-23 RX ADMIN — Medication 81 MILLIGRAM(S): at 11:53

## 2019-08-23 RX ADMIN — HEPARIN SODIUM 5000 UNIT(S): 5000 INJECTION INTRAVENOUS; SUBCUTANEOUS at 06:04

## 2019-08-23 NOTE — DISCHARGE NOTE NURSING/CASE MANAGEMENT/SOCIAL WORK - NSDCFUADDAPPT_GEN_ALL_CORE_FT
Please continue to take your bowel regimen as directed to help you have daily bowel movements. Please also continue to stay hydrated.     Please follow up with your PCP in 1-2 days of discharge.    Please follow up with GI doctor in 1 week of discharge.

## 2019-08-23 NOTE — DISCHARGE NOTE NURSING/CASE MANAGEMENT/SOCIAL WORK - NSDCDPATPORTLINK_GEN_ALL_CORE
You can access the [facility], offered by [facility], by registering with the following website: [email or web address]

## 2019-08-25 LAB
CULTURE RESULTS: SIGNIFICANT CHANGE UP
CULTURE RESULTS: SIGNIFICANT CHANGE UP
SPECIMEN SOURCE: SIGNIFICANT CHANGE UP
SPECIMEN SOURCE: SIGNIFICANT CHANGE UP

## 2019-08-26 ENCOUNTER — CHART COPY (OUTPATIENT)
Age: 59
End: 2019-08-26

## 2019-08-29 PROBLEM — F01.50 VASCULAR DEMENTIA WITHOUT BEHAVIORAL DISTURBANCE: Chronic | Status: ACTIVE | Noted: 2019-08-16

## 2019-09-11 ENCOUNTER — RECORD ABSTRACTING (OUTPATIENT)
Age: 59
End: 2019-09-11

## 2019-09-11 ENCOUNTER — APPOINTMENT (OUTPATIENT)
Dept: INTERNAL MEDICINE | Facility: CLINIC | Age: 59
End: 2019-09-11
Payer: MEDICARE

## 2019-09-11 VITALS — RESPIRATION RATE: 14 BRPM | DIASTOLIC BLOOD PRESSURE: 72 MMHG | HEART RATE: 78 BPM | SYSTOLIC BLOOD PRESSURE: 110 MMHG

## 2019-09-11 VITALS — HEIGHT: 67 IN | BODY MASS INDEX: 25.74 KG/M2 | WEIGHT: 164 LBS

## 2019-09-11 DIAGNOSIS — K59.01 SLOW TRANSIT CONSTIPATION: ICD-10-CM

## 2019-09-11 DIAGNOSIS — K56.41 FECAL IMPACTION: ICD-10-CM

## 2019-09-11 PROCEDURE — 99214 OFFICE O/P EST MOD 30 MIN: CPT

## 2019-09-11 NOTE — PHYSICAL EXAM
[No Acute Distress] : no acute distress [Well Developed] : well developed [Well Nourished] : well nourished [Normal Sclera/Conjunctiva] : normal sclera/conjunctiva [Well-Appearing] : well-appearing [PERRL] : pupils equal round and reactive to light [EOMI] : extraocular movements intact [Normal Outer Ear/Nose] : the outer ears and nose were normal in appearance [Normal Oropharynx] : the oropharynx was normal [No JVD] : no jugular venous distention [No Lymphadenopathy] : no lymphadenopathy [Thyroid Normal, No Nodules] : the thyroid was normal and there were no nodules present [Supple] : supple [No Respiratory Distress] : no respiratory distress  [No Accessory Muscle Use] : no accessory muscle use [Clear to Auscultation] : lungs were clear to auscultation bilaterally [Normal Rate] : normal rate  [Regular Rhythm] : with a regular rhythm [Normal S1, S2] : normal S1 and S2 [No Murmur] : no murmur heard [No Carotid Bruits] : no carotid bruits [No Abdominal Bruit] : a ~M bruit was not heard ~T in the abdomen [No Varicosities] : no varicosities [Pedal Pulses Present] : the pedal pulses are present [No Edema] : there was no peripheral edema [No Palpable Aorta] : no palpable aorta [No Extremity Clubbing/Cyanosis] : no extremity clubbing/cyanosis [Soft] : abdomen soft [Non Tender] : non-tender [Non-distended] : non-distended [No Masses] : no abdominal mass palpated [No HSM] : no HSM [Normal Bowel Sounds] : normal bowel sounds [Normal Posterior Cervical Nodes] : no posterior cervical lymphadenopathy [Normal Anterior Cervical Nodes] : no anterior cervical lymphadenopathy [No CVA Tenderness] : no CVA  tenderness [No Spinal Tenderness] : no spinal tenderness [No Joint Swelling] : no joint swelling [No Rash] : no rash [Grossly Normal Strength/Tone] : grossly normal strength/tone [Coordination Grossly Intact] : coordination grossly intact [Normal Gait] : normal gait [No Focal Deficits] : no focal deficits [Deep Tendon Reflexes (DTR)] : deep tendon reflexes were 2+ and symmetric [Normal Affect] : the affect was normal [Normal Insight/Judgement] : insight and judgment were intact

## 2019-09-11 NOTE — HEALTH RISK ASSESSMENT
[] : No [No] : No [(PHQ-2) Unable to screen] : PHQ-2: unable to screen [UnableToScreenReason] : not verbal has advance dementia

## 2019-09-11 NOTE — HISTORY OF PRESENT ILLNESS
[FreeTextEntry1] : follow up hospitalization\par  [de-identified] : follow up hospitalziation \par discharge Aug 23, had stercolits and constipation with impaction\par after he was admitted for inability to eat\par he is now eating everything again\par he is alert but is not verbal\par he needs a note to return to program

## 2019-09-11 NOTE — ASSESSMENT
[FreeTextEntry1] : amitizia added for constipation\par samples given\par bp stable\par seems to be eating ok now\par follow up one month \par no changes to meds\par bilateral heel ulcers continue dressing as per nursing, stage 1.\par labs reviewed from hospital and are ok

## 2019-09-27 ENCOUNTER — MEDICATION RENEWAL (OUTPATIENT)
Age: 59
End: 2019-09-27

## 2019-09-29 PROCEDURE — 85730 THROMBOPLASTIN TIME PARTIAL: CPT

## 2019-09-29 PROCEDURE — 74018 RADEX ABDOMEN 1 VIEW: CPT

## 2019-09-29 PROCEDURE — 74177 CT ABD & PELVIS W/CONTRAST: CPT

## 2019-09-29 PROCEDURE — 92526 ORAL FUNCTION THERAPY: CPT

## 2019-09-29 PROCEDURE — 70491 CT SOFT TISSUE NECK W/DYE: CPT

## 2019-09-29 PROCEDURE — 84145 PROCALCITONIN (PCT): CPT

## 2019-09-29 PROCEDURE — 71260 CT THORAX DX C+: CPT

## 2019-09-29 PROCEDURE — 97116 GAIT TRAINING THERAPY: CPT

## 2019-09-29 PROCEDURE — 83605 ASSAY OF LACTIC ACID: CPT

## 2019-09-29 PROCEDURE — 96374 THER/PROPH/DIAG INJ IV PUSH: CPT | Mod: XU

## 2019-09-29 PROCEDURE — 93005 ELECTROCARDIOGRAM TRACING: CPT

## 2019-09-29 PROCEDURE — 81001 URINALYSIS AUTO W/SCOPE: CPT

## 2019-09-29 PROCEDURE — 85610 PROTHROMBIN TIME: CPT

## 2019-09-29 PROCEDURE — 92610 EVALUATE SWALLOWING FUNCTION: CPT

## 2019-09-29 PROCEDURE — 87040 BLOOD CULTURE FOR BACTERIA: CPT

## 2019-09-29 PROCEDURE — 71045 X-RAY EXAM CHEST 1 VIEW: CPT

## 2019-09-29 PROCEDURE — 96372 THER/PROPH/DIAG INJ SC/IM: CPT | Mod: XU

## 2019-09-29 PROCEDURE — 70450 CT HEAD/BRAIN W/O DYE: CPT

## 2019-09-29 PROCEDURE — 96375 TX/PRO/DX INJ NEW DRUG ADDON: CPT | Mod: XU

## 2019-09-29 PROCEDURE — 80053 COMPREHEN METABOLIC PANEL: CPT

## 2019-09-29 PROCEDURE — 86803 HEPATITIS C AB TEST: CPT

## 2019-09-29 PROCEDURE — 36415 COLL VENOUS BLD VENIPUNCTURE: CPT

## 2019-09-29 PROCEDURE — 83036 HEMOGLOBIN GLYCOSYLATED A1C: CPT

## 2019-09-29 PROCEDURE — 80048 BASIC METABOLIC PNL TOTAL CA: CPT

## 2019-09-29 PROCEDURE — 99285 EMERGENCY DEPT VISIT HI MDM: CPT | Mod: 25

## 2019-09-29 PROCEDURE — 83735 ASSAY OF MAGNESIUM: CPT

## 2019-09-29 PROCEDURE — 97530 THERAPEUTIC ACTIVITIES: CPT

## 2019-09-29 PROCEDURE — 84100 ASSAY OF PHOSPHORUS: CPT

## 2019-09-29 PROCEDURE — 96361 HYDRATE IV INFUSION ADD-ON: CPT

## 2019-09-29 PROCEDURE — 80061 LIPID PANEL: CPT

## 2019-09-29 PROCEDURE — 82962 GLUCOSE BLOOD TEST: CPT

## 2019-09-29 PROCEDURE — 85027 COMPLETE CBC AUTOMATED: CPT

## 2019-10-16 ENCOUNTER — OTHER (OUTPATIENT)
Age: 59
End: 2019-10-16

## 2019-10-18 ENCOUNTER — APPOINTMENT (OUTPATIENT)
Dept: GASTROENTEROLOGY | Facility: CLINIC | Age: 59
End: 2019-10-18
Payer: MEDICARE

## 2019-10-18 VITALS
OXYGEN SATURATION: 96 % | BODY MASS INDEX: 25.53 KG/M2 | RESPIRATION RATE: 16 BRPM | SYSTOLIC BLOOD PRESSURE: 132 MMHG | HEART RATE: 74 BPM | WEIGHT: 163 LBS | DIASTOLIC BLOOD PRESSURE: 100 MMHG

## 2019-10-18 DIAGNOSIS — F03.90 UNSPECIFIED DEMENTIA W/OUT BEHAVIORAL DISTURBANCE: ICD-10-CM

## 2019-10-18 DIAGNOSIS — Z86.59 PERSONAL HISTORY OF OTHER MENTAL AND BEHAVIORAL DISORDERS: ICD-10-CM

## 2019-10-18 DIAGNOSIS — K59.09 OTHER CONSTIPATION: ICD-10-CM

## 2019-10-18 PROCEDURE — 99215 OFFICE O/P EST HI 40 MIN: CPT

## 2019-10-18 PROCEDURE — 82272 OCCULT BLD FECES 1-3 TESTS: CPT

## 2019-10-18 RX ORDER — CROMOLYN SODIUM 40 MG/ML
4 SOLUTION/ DROPS OPHTHALMIC 4 TIMES DAILY
Qty: 1 | Refills: 2 | Status: DISCONTINUED | COMMUNITY
Start: 2017-03-20 | End: 2019-10-18

## 2019-10-18 RX ORDER — TRIAMCINOLONE ACETONIDE 1 MG/G
0.1 CREAM TOPICAL TWICE DAILY
Qty: 1 | Refills: 4 | Status: DISCONTINUED | COMMUNITY
Start: 2018-06-22 | End: 2019-10-18

## 2019-10-18 RX ORDER — LUBIPROSTONE 24 UG/1
24 CAPSULE, GELATIN COATED ORAL
Qty: 30 | Refills: 5 | Status: DISCONTINUED | COMMUNITY
Start: 2019-09-11 | End: 2019-10-18

## 2019-10-18 RX ORDER — BENAZEPRIL HYDROCHLORIDE 40 MG/1
40 TABLET, FILM COATED ORAL
Qty: 28 | Refills: 10 | Status: ACTIVE | COMMUNITY
Start: 2018-05-12

## 2019-10-18 RX ORDER — COLLAGENASE SANTYL 250 [ARB'U]/G
250 OINTMENT TOPICAL DAILY
Qty: 1 | Refills: 4 | Status: DISCONTINUED | COMMUNITY
Start: 2017-12-01 | End: 2019-10-18

## 2019-10-18 RX ORDER — METHYLPREDNISOLONE 4 MG/1
4 TABLET ORAL
Qty: 1 | Refills: 0 | Status: DISCONTINUED | COMMUNITY
Start: 2018-06-22 | End: 2019-10-18

## 2019-10-18 NOTE — ASSESSMENT
[FreeTextEntry1] : impression resolved fecal impaction. Resolved dehydration, having acceptable. Bowel movements without any laxatives. As a rectal exam is unremarkable. Stool for occult blood is negative.\par given patient'sseverely depressed mental status andadvanced dementia and nonverbal state Further investigations with colonoscopy are not appropriate at this time.\par Maintain a high-fiber diet and adequate hydration If constipation becomes an issue initially used MiraLax 1-3 doses per day. If unsuccessful, then proceed  proceed on to use of Lactulose syrup at 30 cc p.o. t.i.d.  RX submitted to pharmacy.  GI office followup in 6-12 months as needed.

## 2019-10-18 NOTE — HISTORY OF PRESENT ILLNESS
[FreeTextEntry1] : 59-year-old white male, previously, H. seen 2 months ago for fecal impaction, at [facility]. Prior history of stroke and dementia. Patient is nonverbal. He is cared for by family members and aides at home with 24-hour assistance.. Family history is negative for colorectal neoplasia to know prior colonoscopies. Patient was treated symptomatically during his hospital stay for constipation, fecal impaction. Enemas and suppositories, and laxatives were employed. Eventually, lactulose at this area cc 4 times a day was employed and toe adequate bowel movements were obtained. No bleeding. No fevers. No abdominal distention. No history of Crucible's syndrome.\par Patient is now tended by his brother reports that since discharge from the hospital lactulose. Has not been necessary. Bowel movements have been occurring on virtually a daily basis. Patient is not in any obvious distress. Decent appetite. He eats well. Obviously, he has not lost any weight or appears to be well hydrated. Prior blood work and thyroid function tests were unrevealing. No prior colonoscopy or endoscopy exams.

## 2019-10-18 NOTE — PHYSICAL EXAM
[General Appearance - Alert] : alert [Sclera] : the sclera and conjunctiva were normal [General Appearance - In No Acute Distress] : in no acute distress [PERRL With Normal Accommodation] : pupils were equal in size, round, and reactive to light [Outer Ear] : the ears and nose were normal in appearance [Extraocular Movements] : extraocular movements were intact [Neck Appearance] : the appearance of the neck was normal [Oropharynx] : the oropharynx was normal [Thyroid Diffuse Enlargement] : the thyroid was not enlarged [Neck Cervical Mass (___cm)] : no neck mass was observed [Jugular Venous Distention Increased] : there was no jugular-venous distention [Thyroid Nodule] : there were no palpable thyroid nodules [Heart Rate And Rhythm] : heart rate was normal and rhythm regular [Auscultation Breath Sounds / Voice Sounds] : lungs were clear to auscultation bilaterally [Heart Sounds] : normal S1 and S2 [Heart Sounds Gallop] : no gallops [Murmurs] : no murmurs [Heart Sounds Pericardial Friction Rub] : no pericardial rub [Normal Sphincter Tone] : normal sphincter tone [No Rectal Mass] : no rectal mass [Internal Hemorrhoid] : no internal hemorrhoids [External Hemorrhoid] : no external hemorrhoids [Occult Blood Positive] : stool was negative for occult blood [Nail Clubbing] : no clubbing  or cyanosis of the fingernails [Abnormal Walk] : normal gait [FreeTextEntry1] : A generous amount of soft stool occupied the rectal vault. No lesions. No tenderness. No fluctuance. No anal or perianal lesions. No decubiti. Patient is incontinent of urine on the examining table. [Musculoskeletal - Swelling] : no joint swelling seen [Motor Tone] : muscle strength and tone were normal [Skin Color & Pigmentation] : normal skin color and pigmentation [Skin Turgor] : normal skin turgor [] : no rash

## 2019-11-22 ENCOUNTER — APPOINTMENT (OUTPATIENT)
Dept: INTERNAL MEDICINE | Facility: CLINIC | Age: 59
End: 2019-11-22
Payer: MEDICARE

## 2019-11-22 VITALS — WEIGHT: 160 LBS | HEIGHT: 67 IN | BODY MASS INDEX: 25.11 KG/M2

## 2019-11-22 VITALS — SYSTOLIC BLOOD PRESSURE: 126 MMHG | RESPIRATION RATE: 12 BRPM | HEART RATE: 70 BPM | DIASTOLIC BLOOD PRESSURE: 84 MMHG

## 2019-11-22 DIAGNOSIS — L97.429 NON-PRESSURE CHRONIC ULCER OF LEFT HEEL AND MIDFOOT WITH UNSPECIFIED SEVERITY: ICD-10-CM

## 2019-11-22 PROCEDURE — 99214 OFFICE O/P EST MOD 30 MIN: CPT | Mod: 25

## 2019-11-22 PROCEDURE — 90688 IIV4 VACCINE SPLT 0.5 ML IM: CPT

## 2019-11-22 PROCEDURE — G0008: CPT

## 2019-11-22 NOTE — HISTORY OF PRESENT ILLNESS
[FreeTextEntry1] : follow up htn\par dementia\par  [de-identified] : follow up htn dementia\par needs flu vaccine for program\par nadia is non verbal was at GI for consitpation no plans to do colonoscopy\par he has no chest pain sob nvd or palpitations\par his behavior is at baseline

## 2019-11-22 NOTE — PHYSICAL EXAM
[No Acute Distress] : no acute distress [Well Nourished] : well nourished [Well Developed] : well developed [Well-Appearing] : well-appearing [Normal Sclera/Conjunctiva] : normal sclera/conjunctiva [PERRL] : pupils equal round and reactive to light [EOMI] : extraocular movements intact [Normal Outer Ear/Nose] : the outer ears and nose were normal in appearance [Normal Oropharynx] : the oropharynx was normal [No JVD] : no jugular venous distention [No Lymphadenopathy] : no lymphadenopathy [Supple] : supple [Thyroid Normal, No Nodules] : the thyroid was normal and there were no nodules present [No Respiratory Distress] : no respiratory distress  [No Accessory Muscle Use] : no accessory muscle use [Clear to Auscultation] : lungs were clear to auscultation bilaterally [Normal Rate] : normal rate  [Regular Rhythm] : with a regular rhythm [Normal S1, S2] : normal S1 and S2 [No Murmur] : no murmur heard [No Carotid Bruits] : no carotid bruits [No Abdominal Bruit] : a ~M bruit was not heard ~T in the abdomen [No Varicosities] : no varicosities [Pedal Pulses Present] : the pedal pulses are present [No Edema] : there was no peripheral edema [No Palpable Aorta] : no palpable aorta [No Extremity Clubbing/Cyanosis] : no extremity clubbing/cyanosis [Soft] : abdomen soft [Non Tender] : non-tender [Non-distended] : non-distended [No Masses] : no abdominal mass palpated [No HSM] : no HSM [Normal Bowel Sounds] : normal bowel sounds [Normal Posterior Cervical Nodes] : no posterior cervical lymphadenopathy [Normal Anterior Cervical Nodes] : no anterior cervical lymphadenopathy [No CVA Tenderness] : no CVA  tenderness [No Spinal Tenderness] : no spinal tenderness [No Joint Swelling] : no joint swelling [Grossly Normal Strength/Tone] : grossly normal strength/tone [No Rash] : no rash [Coordination Grossly Intact] : coordination grossly intact [No Focal Deficits] : no focal deficits [Normal Gait] : normal gait [Deep Tendon Reflexes (DTR)] : deep tendon reflexes were 2+ and symmetric [Normal Affect] : the affect was normal [Normal Insight/Judgement] : insight and judgment were intact [de-identified] : left heel crusted over

## 2019-11-22 NOTE — ASSESSMENT
[FreeTextEntry1] : bp stable\par dementia stable\par lipids stable\par left heel ucler dry and crusted avoid excessive pressure\par flu vaccine givne

## 2020-01-27 ENCOUNTER — OTHER (OUTPATIENT)
Age: 60
End: 2020-01-27

## 2020-04-22 ENCOUNTER — NON-APPOINTMENT (OUTPATIENT)
Age: 60
End: 2020-04-22

## 2020-04-22 ENCOUNTER — APPOINTMENT (OUTPATIENT)
Dept: INTERNAL MEDICINE | Facility: CLINIC | Age: 60
End: 2020-04-22
Payer: MEDICARE

## 2020-04-22 VITALS
SYSTOLIC BLOOD PRESSURE: 120 MMHG | DIASTOLIC BLOOD PRESSURE: 72 MMHG | WEIGHT: 160 LBS | HEIGHT: 67 IN | HEART RATE: 68 BPM | BODY MASS INDEX: 25.11 KG/M2

## 2020-04-22 PROCEDURE — 93000 ELECTROCARDIOGRAM COMPLETE: CPT

## 2020-04-22 PROCEDURE — G0439: CPT

## 2020-04-22 PROCEDURE — 36415 COLL VENOUS BLD VENIPUNCTURE: CPT

## 2020-04-22 NOTE — HISTORY OF PRESENT ILLNESS
[FreeTextEntry1] : For CPE [de-identified] : For CPE\par has dementia, urinary incontinence\par lives with brother\par patient has been stable, is not verbal but is well behaved\par he has no chest pain sob nvd \par has good appetite and normal bowel movements

## 2020-04-22 NOTE — PHYSICAL EXAM
[No Acute Distress] : no acute distress [Well Nourished] : well nourished [Well Developed] : well developed [Well-Appearing] : well-appearing [Normal Sclera/Conjunctiva] : normal sclera/conjunctiva [PERRL] : pupils equal round and reactive to light [EOMI] : extraocular movements intact [Normal Outer Ear/Nose] : the outer ears and nose were normal in appearance [Normal Oropharynx] : the oropharynx was normal [No JVD] : no jugular venous distention [No Lymphadenopathy] : no lymphadenopathy [Supple] : supple [Thyroid Normal, No Nodules] : the thyroid was normal and there were no nodules present [No Respiratory Distress] : no respiratory distress  [No Accessory Muscle Use] : no accessory muscle use [Clear to Auscultation] : lungs were clear to auscultation bilaterally [Normal Rate] : normal rate  [Regular Rhythm] : with a regular rhythm [No Murmur] : no murmur heard [Normal S1, S2] : normal S1 and S2 [No Carotid Bruits] : no carotid bruits [No Abdominal Bruit] : a ~M bruit was not heard ~T in the abdomen [No Varicosities] : no varicosities [Pedal Pulses Present] : the pedal pulses are present [No Edema] : there was no peripheral edema [No Palpable Aorta] : no palpable aorta [No Extremity Clubbing/Cyanosis] : no extremity clubbing/cyanosis [Soft] : abdomen soft [Non Tender] : non-tender [Non-distended] : non-distended [No Masses] : no abdominal mass palpated [No HSM] : no HSM [Normal Bowel Sounds] : normal bowel sounds [Normal Posterior Cervical Nodes] : no posterior cervical lymphadenopathy [Normal Anterior Cervical Nodes] : no anterior cervical lymphadenopathy [No CVA Tenderness] : no CVA  tenderness [No Spinal Tenderness] : no spinal tenderness [No Joint Swelling] : no joint swelling [Grossly Normal Strength/Tone] : grossly normal strength/tone [No Rash] : no rash [Coordination Grossly Intact] : coordination grossly intact [No Focal Deficits] : no focal deficits [Normal Affect] : the affect was normal [Normal Gait] : normal gait [Deep Tendon Reflexes (DTR)] : deep tendon reflexes were 2+ and symmetric [Normal Insight/Judgement] : insight and judgment were intact

## 2020-04-22 NOTE — HEALTH RISK ASSESSMENT
[] : No [No] : No [No falls in past year] : Patient reported no falls in the past year [0] : 2) Feeling down, depressed, or hopeless: Not at all (0) [HIV test declined] : HIV test declined [Hepatitis C test declined] : Hepatitis C test declined [Change in mental status noted] : No change in mental status noted [Language] : difficulty with language [Behavior] : denies difficulty with behavior [Reasoning] : denies difficulty with reasoning [Learning/Retaining New Information] : difficulty learning/retaining new information [Handling Complex Tasks] : difficulty handling complex tasks [Spatial Ability and Orientation] : difficulty with spatial ability and orientation [With Family] : lives with family [College] : College [Sexually Active] : not sexually active [] :  [High Risk Behavior] : no high risk behavior [Feels Safe at Home] : Feels safe at home [Fully functional (using the telephone, shopping, preparing meals, housekeeping, doing laundry, using] : Fully functional and needs no help or supervision to perform IADLs (using the telephone, shopping, preparing meals, housekeeping, doing laundry, using transportation, managing medications and managing finances) [Fully functional (bathing, dressing, toileting, transferring, walking, feeding)] : Fully functional (bathing, dressing, toileting, transferring, walking, feeding) [Reports changes in vision] : Reports no changes in vision [Reports changes in hearing] : Reports no changes in hearing [Reports changes in dental health] : Reports no changes in dental health [Carbon Monoxide Detector] : no carbon monoxide detector [Smoke Detector] : smoke detector [Safety elements used in home] : safety elements used in home [Guns at Home] : no guns at home [Sunscreen] : does not use sunscreen [Caregiver Concerns] : does not have caregiver concerns [Seat Belt] :  uses seat belt [Travel to Developing Areas] : does not  travel to developing areas [With Patient/Caregiver] : With Patient/Caregiver [Name: ___] : Health Care Proxy's Name: [unfilled]  [Relationship: ___] : Relationship: [unfilled] [I will adhere to the patient's wishes as expressed in the advance directive except as noted below.] : I will adhere to the patient's wishes as expressed in the advance directive except as noted below [AdvancecareDate] : 4/22/20

## 2020-04-22 NOTE — ASSESSMENT
[FreeTextEntry1] : medically stble\par no new issues to report\par no chenges to medication\par follow up prn or 6months

## 2020-04-23 LAB
25(OH)D3 SERPL-MCNC: 19.5 NG/ML
ALBUMIN SERPL ELPH-MCNC: 4.4 G/DL
ALP BLD-CCNC: 90 U/L
ALT SERPL-CCNC: 28 U/L
ANION GAP SERPL CALC-SCNC: 14 MMOL/L
AST SERPL-CCNC: 19 U/L
BASOPHILS # BLD AUTO: 0.03 K/UL
BASOPHILS NFR BLD AUTO: 0.5 %
BILIRUB SERPL-MCNC: 0.3 MG/DL
BUN SERPL-MCNC: 17 MG/DL
CALCIUM SERPL-MCNC: 9.9 MG/DL
CHLORIDE SERPL-SCNC: 103 MMOL/L
CHOLEST SERPL-MCNC: 151 MG/DL
CHOLEST/HDLC SERPL: 3.2 RATIO
CO2 SERPL-SCNC: 26 MMOL/L
CREAT SERPL-MCNC: 0.92 MG/DL
EOSINOPHIL # BLD AUTO: 0.11 K/UL
EOSINOPHIL NFR BLD AUTO: 1.7 %
GLUCOSE SERPL-MCNC: 84 MG/DL
HCT VFR BLD CALC: 48.4 %
HDLC SERPL-MCNC: 48 MG/DL
HGB BLD-MCNC: 15.1 G/DL
IMM GRANULOCYTES NFR BLD AUTO: 0.2 %
LDLC SERPL CALC-MCNC: 88 MG/DL
LYMPHOCYTES # BLD AUTO: 1.49 K/UL
LYMPHOCYTES NFR BLD AUTO: 22.5 %
MAN DIFF?: NORMAL
MCHC RBC-ENTMCNC: 29.5 PG
MCHC RBC-ENTMCNC: 31.2 GM/DL
MCV RBC AUTO: 94.7 FL
MONOCYTES # BLD AUTO: 0.63 K/UL
MONOCYTES NFR BLD AUTO: 9.5 %
NEUTROPHILS # BLD AUTO: 4.36 K/UL
NEUTROPHILS NFR BLD AUTO: 65.6 %
PLATELET # BLD AUTO: 228 K/UL
POTASSIUM SERPL-SCNC: 4.9 MMOL/L
PROT SERPL-MCNC: 7.7 G/DL
PSA SERPL-MCNC: 0.51 NG/ML
RBC # BLD: 5.11 M/UL
RBC # FLD: 12.2 %
SODIUM SERPL-SCNC: 144 MMOL/L
T4 FREE SERPL-MCNC: 1.2 NG/DL
TRIGL SERPL-MCNC: 75 MG/DL
TSH SERPL-ACNC: 2.48 UIU/ML
WBC # FLD AUTO: 6.63 K/UL

## 2020-10-21 ENCOUNTER — APPOINTMENT (OUTPATIENT)
Dept: INTERNAL MEDICINE | Facility: CLINIC | Age: 60
End: 2020-10-21
Payer: MEDICARE

## 2020-10-21 VITALS
HEART RATE: 72 BPM | DIASTOLIC BLOOD PRESSURE: 84 MMHG | BODY MASS INDEX: 25.74 KG/M2 | WEIGHT: 164 LBS | SYSTOLIC BLOOD PRESSURE: 128 MMHG | RESPIRATION RATE: 12 BRPM | HEIGHT: 67 IN

## 2020-10-21 VITALS — HEIGHT: 67 IN | WEIGHT: 164 LBS | BODY MASS INDEX: 25.74 KG/M2

## 2020-10-21 DIAGNOSIS — L84 CORNS AND CALLOSITIES: ICD-10-CM

## 2020-10-21 PROCEDURE — G0008: CPT

## 2020-10-21 PROCEDURE — 90686 IIV4 VACC NO PRSV 0.5 ML IM: CPT

## 2020-10-21 PROCEDURE — 99214 OFFICE O/P EST MOD 30 MIN: CPT | Mod: 25

## 2020-10-21 NOTE — HISTORY OF PRESENT ILLNESS
[FreeTextEntry1] : follow up dementia\par needs flu vaccine [de-identified] : follow up dementia\par needs flu vaccine\par has been at baseline\par here with mom and brother\par needs to see foot doctor has peeling skin of his foot and hard callus\par he is not verbal

## 2020-10-21 NOTE — ASSESSMENT
[FreeTextEntry1] : bp stable\par lipid repeat labs in April cpe\par dementia stable\par flu vaccine given\par see podiatry Dr. Contreras for foot side callus

## 2020-10-21 NOTE — HEALTH RISK ASSESSMENT
[No] : No [No falls in past year] : Patient reported no falls in the past year [(PHQ-2) Unable to screen] : PHQ-2: unable to screen [] : No [UnableToScreenReason] : non verbal dementia

## 2021-03-19 ENCOUNTER — APPOINTMENT (OUTPATIENT)
Dept: PODIATRY | Facility: HOSPITAL | Age: 61
End: 2021-03-19
Payer: MEDICARE

## 2021-03-19 ENCOUNTER — OUTPATIENT (OUTPATIENT)
Dept: OUTPATIENT SERVICES | Facility: HOSPITAL | Age: 61
LOS: 1 days | Discharge: HOME | End: 2021-03-19
Payer: MEDICARE

## 2021-03-19 ENCOUNTER — APPOINTMENT (OUTPATIENT)
Dept: WOUND CARE | Facility: HOSPITAL | Age: 61
End: 2021-03-19
Payer: MEDICARE

## 2021-03-19 VITALS
HEIGHT: 67 IN | RESPIRATION RATE: 20 BRPM | BODY MASS INDEX: 25.74 KG/M2 | SYSTOLIC BLOOD PRESSURE: 120 MMHG | WEIGHT: 164 LBS | HEART RATE: 62 BPM | TEMPERATURE: 97.6 F | OXYGEN SATURATION: 98 % | DIASTOLIC BLOOD PRESSURE: 80 MMHG

## 2021-03-19 DIAGNOSIS — Z86.39 PERSONAL HISTORY OF OTHER ENDOCRINE, NUTRITIONAL AND METABOLIC DISEASE: ICD-10-CM

## 2021-03-19 DIAGNOSIS — Z98.890 OTHER SPECIFIED POSTPROCEDURAL STATES: Chronic | ICD-10-CM

## 2021-03-19 DIAGNOSIS — S91.309A UNSPECIFIED OPEN WOUND, UNSPECIFIED FOOT, INITIAL ENCOUNTER: ICD-10-CM

## 2021-03-19 DIAGNOSIS — Z86.73 PERSONAL HISTORY OF TRANSIENT ISCHEMIC ATTACK (TIA), AND CEREBRAL INFARCTION W/OUT RESIDUAL DEFICITS: ICD-10-CM

## 2021-03-19 DIAGNOSIS — Z90.79 ACQUIRED ABSENCE OF OTHER GENITAL ORGAN(S): Chronic | ICD-10-CM

## 2021-03-19 PROCEDURE — 99214 OFFICE O/P EST MOD 30 MIN: CPT

## 2021-03-19 PROCEDURE — G0463: CPT

## 2021-03-19 RX ORDER — CLOTRIMAZOLE AND BETAMETHASONE DIPROPIONATE 10; .5 MG/G; MG/G
1-0.05 CREAM TOPICAL TWICE DAILY
Qty: 1 | Refills: 5 | Status: ACTIVE | COMMUNITY
Start: 2021-03-19 | End: 1900-01-01

## 2021-03-19 RX ORDER — LACTULOSE 10 G/15ML
20 SOLUTION ORAL 3 TIMES DAILY
Qty: 8100 | Refills: 1 | Status: DISCONTINUED | COMMUNITY
Start: 2019-10-18 | End: 2021-03-19

## 2021-03-19 NOTE — PHYSICAL EXAM
[1+] : left 1+ [Ankle Swelling (On Exam)] : present [Varicose Veins Of Lower Extremities] : bilaterally [Ankle Swelling On The Right] : mild [] : not present [Purpura] : no purpura  [Petechiae] : no petechiae [Skin Ulcer] : no ulcer [Alert] : alert [Oriented to Person] : disoriented to person [Oriented to Place] : disoriented to place [Oriented to Time] : disoriented to time [Calm] : calm [de-identified] : calm [de-identified] : HTN, HLD [de-identified] : hammer toes  [de-identified] : Dry tinea with cracking of skin , atopic dermatitis right dorsal foot  [de-identified] : alzheimers  [FreeTextEntry1] : Right Foot- localized irritation- No open wounds/ skin intact [de-identified] : Lotrisone [de-identified] : Cleansed with Normal saline\par  [FreeTextEntry7] : Left Foot- No open wounds/ skin intact [de-identified] : No Dressing [de-identified] : CIRCULATION\par Dorsalis Pedis: R palpable  L palpable\par Posterior Tibialis: R palpable L palpable\par Extremity Color: Pigmented\par Extremity Temperature: Warm\par Capillary Refill: < 3 seconds bilaterally\par Vascular studies not ordered by Dr Hwang\par \par \par  [TWNoteComboBox4] : None

## 2021-03-19 NOTE — PLAN
[FreeTextEntry1] : Lotrisone , ketoconazole and bag balm with white socks , PTR 2 weeks ,   Spent 30 minutes for patient care and medical decision making.\par

## 2021-03-19 NOTE — ASSESSMENT
[Verbal] : Verbal [Demo] : Demo [Family member] : Family member [Good - alert, interested, motivated] : Good - alert, interested, motivated [Verbalizes knowledge/Understanding] : Verbalizes knowledge/understanding [Foot Care] : foot care [Skin Care] : skin care [Pressure relief] : pressure relief [Signs and symptoms of infection] : sign and symptoms of infection [How and When to Call] : how and when to call [Off-loading] : off-loading [Patient responsibility to plan of care] : patient responsibility to plan of care [] : Yes [Stable] : stable [Home] : Home [Wheelchair] : Wheelchair [FreeTextEntry2] : Maintain optimal skin integrity\par  [FreeTextEntry4] : Dr Hwang/ Photos taken\par Lotrisone escribed by Dr Hwang\par Pt's Brother instructed by Dr Hwang to apply Lotrisone to localized irritation area of Right Foot/ continue with Ketoconazole (as had been prescribed by Pt's Podiatrist) to intact skin bilateral Feet & may apply Bag Balm to dry skin bilateral Feet & wear white cotton socks- Pt's Brother verbalizes understanding\par F/U to [facility] in 2 weeks

## 2021-03-19 NOTE — HISTORY OF PRESENT ILLNESS
[FreeTextEntry1] : The wound is located on both Feet- the areas of concern started about 4-5 months ago- pt has been seen by Podiatrist ([facility]- Dr Medina) regularly- Pt's Brother states pt has been prescribed Ketoconazole & Ammonium Lactate but areas have not been healing so he scheduled appointment to [facility]

## 2021-03-19 NOTE — VITALS
[de-identified] : Pt's Brother states pt does not c/o any pain or discomforts- pt appears comfortable

## 2021-03-19 NOTE — REASON FOR VISIT
[Consultation] : a consultation visit [Family Member] : family member [FreeTextEntry1] : New Evaluation

## 2021-03-19 NOTE — REVIEW OF SYSTEMS
[Fever] : no fever [Chills] : no chills [Eye Pain] : no eye pain [Shortness Of Breath] : no shortness of breath [Abdominal Pain] : no abdominal pain [Skin Lesions] : skin lesion [Anxiety] : no anxiety [Easy Bleeding] : no tendency for easy bleeding [Negative] : Endocrine [FreeTextEntry5] : HTN, HLD  [FreeTextEntry9] : Hammer toes [de-identified] : Fungal , dry skin with cracking and atopic dermatitis  [de-identified] : alzheimers  , accompanied by his brother

## 2021-03-20 DIAGNOSIS — I10 ESSENTIAL (PRIMARY) HYPERTENSION: ICD-10-CM

## 2021-03-20 DIAGNOSIS — G30.9 ALZHEIMER'S DISEASE, UNSPECIFIED: ICD-10-CM

## 2021-03-20 DIAGNOSIS — F02.80 DEMENTIA IN OTHER DISEASES CLASSIFIED ELSEWHERE, UNSPECIFIED SEVERITY, WITHOUT BEHAVIORAL DISTURBANCE, PSYCHOTIC DISTURBANCE, MOOD DISTURBANCE, AND ANXIETY: ICD-10-CM

## 2021-03-20 DIAGNOSIS — Z79.899 OTHER LONG TERM (CURRENT) DRUG THERAPY: ICD-10-CM

## 2021-03-20 DIAGNOSIS — L20.89 OTHER ATOPIC DERMATITIS: ICD-10-CM

## 2021-03-20 DIAGNOSIS — B35.3 TINEA PEDIS: ICD-10-CM

## 2021-03-20 DIAGNOSIS — E78.5 HYPERLIPIDEMIA, UNSPECIFIED: ICD-10-CM

## 2021-03-20 DIAGNOSIS — Z86.73 PERSONAL HISTORY OF TRANSIENT ISCHEMIC ATTACK (TIA), AND CEREBRAL INFARCTION WITHOUT RESIDUAL DEFICITS: ICD-10-CM

## 2021-03-20 DIAGNOSIS — Z88.3 ALLERGY STATUS TO OTHER ANTI-INFECTIVE AGENTS: ICD-10-CM

## 2021-04-02 ENCOUNTER — APPOINTMENT (OUTPATIENT)
Dept: PODIATRY | Facility: HOSPITAL | Age: 61
End: 2021-04-02
Payer: MEDICARE

## 2021-04-02 ENCOUNTER — OUTPATIENT (OUTPATIENT)
Dept: OUTPATIENT SERVICES | Facility: HOSPITAL | Age: 61
LOS: 1 days | Discharge: HOME | End: 2021-04-02
Payer: MEDICARE

## 2021-04-02 VITALS
BODY MASS INDEX: 25.74 KG/M2 | TEMPERATURE: 97.6 F | RESPIRATION RATE: 20 BRPM | SYSTOLIC BLOOD PRESSURE: 111 MMHG | WEIGHT: 164 LBS | HEIGHT: 67 IN | HEART RATE: 68 BPM | DIASTOLIC BLOOD PRESSURE: 81 MMHG | OXYGEN SATURATION: 98 %

## 2021-04-02 DIAGNOSIS — Z90.79 ACQUIRED ABSENCE OF OTHER GENITAL ORGAN(S): Chronic | ICD-10-CM

## 2021-04-02 DIAGNOSIS — Z98.890 OTHER SPECIFIED POSTPROCEDURAL STATES: Chronic | ICD-10-CM

## 2021-04-02 DIAGNOSIS — S91.309A UNSPECIFIED OPEN WOUND, UNSPECIFIED FOOT, INITIAL ENCOUNTER: ICD-10-CM

## 2021-04-02 PROCEDURE — 99213 OFFICE O/P EST LOW 20 MIN: CPT

## 2021-04-02 PROCEDURE — G0463: CPT

## 2021-04-05 ENCOUNTER — NON-APPOINTMENT (OUTPATIENT)
Age: 61
End: 2021-04-05

## 2021-04-06 ENCOUNTER — NON-APPOINTMENT (OUTPATIENT)
Age: 61
End: 2021-04-06

## 2021-04-06 ENCOUNTER — APPOINTMENT (OUTPATIENT)
Dept: INTERNAL MEDICINE | Facility: CLINIC | Age: 61
End: 2021-04-06
Payer: MEDICARE

## 2021-04-06 VITALS
SYSTOLIC BLOOD PRESSURE: 108 MMHG | WEIGHT: 164 LBS | HEIGHT: 67 IN | BODY MASS INDEX: 25.74 KG/M2 | HEART RATE: 72 BPM | RESPIRATION RATE: 12 BRPM | DIASTOLIC BLOOD PRESSURE: 72 MMHG

## 2021-04-06 DIAGNOSIS — L84 CORNS AND CALLOSITIES: ICD-10-CM

## 2021-04-06 DIAGNOSIS — L20.89 OTHER ATOPIC DERMATITIS: ICD-10-CM

## 2021-04-06 DIAGNOSIS — E78.5 HYPERLIPIDEMIA, UNSPECIFIED: ICD-10-CM

## 2021-04-06 DIAGNOSIS — Z88.3 ALLERGY STATUS TO OTHER ANTI-INFECTIVE AGENTS: ICD-10-CM

## 2021-04-06 DIAGNOSIS — G30.9 ALZHEIMER'S DISEASE, UNSPECIFIED: ICD-10-CM

## 2021-04-06 DIAGNOSIS — Z86.73 PERSONAL HISTORY OF TRANSIENT ISCHEMIC ATTACK (TIA), AND CEREBRAL INFARCTION WITHOUT RESIDUAL DEFICITS: ICD-10-CM

## 2021-04-06 DIAGNOSIS — F02.80 DEMENTIA IN OTHER DISEASES CLASSIFIED ELSEWHERE, UNSPECIFIED SEVERITY, WITHOUT BEHAVIORAL DISTURBANCE, PSYCHOTIC DISTURBANCE, MOOD DISTURBANCE, AND ANXIETY: ICD-10-CM

## 2021-04-06 DIAGNOSIS — B35.3 TINEA PEDIS: ICD-10-CM

## 2021-04-06 DIAGNOSIS — Z79.899 OTHER LONG TERM (CURRENT) DRUG THERAPY: ICD-10-CM

## 2021-04-06 DIAGNOSIS — I10 ESSENTIAL (PRIMARY) HYPERTENSION: ICD-10-CM

## 2021-04-06 PROCEDURE — 36415 COLL VENOUS BLD VENIPUNCTURE: CPT

## 2021-04-06 PROCEDURE — G0439: CPT

## 2021-04-06 NOTE — HEALTH RISK ASSESSMENT
[Good] : ~his/her~  mood as  good [] : No [No] : No [No falls in past year] : Patient reported no falls in the past year [0] : 2) Feeling down, depressed, or hopeless: Not at all (0) [(PHQ-2) Unable to screen] : PHQ-2: unable to screen [UnableToScreenReason] : severe dementia [Patient declined colonoscopy] : Patient declined colonoscopy [HIV test declined] : HIV test declined [Hepatitis C test declined] : Hepatitis C test declined [Change in mental status noted] : Change in mental status noted [Language] : difficulty with language [Behavior] : difficulty with behavior [Learning/Retaining New Information] : difficulty learning/retaining new information [Reasoning] : difficulty with reasoning [Spatial Ability and Orientation] : difficulty with spatial ability and orientation [Behavioral] : behavioral [With Family] : lives with family [College] : College [] :  [Sexually Active] : not sexually active [High Risk Behavior] : no high risk behavior [Feels Safe at Home] : Feels safe at home [ColonoscopyComments] : deferring for now [de-identified] : needs assistance [de-identified] : needs assistance unable to manage

## 2021-04-06 NOTE — REVIEW OF SYSTEMS
[Fever] : no fever [Chills] : no chills [Eye Pain] : no eye pain [Shortness Of Breath] : no shortness of breath [Abdominal Pain] : no abdominal pain [Skin Lesions] : skin lesion [Anxiety] : no anxiety [Easy Bleeding] : no tendency for easy bleeding [Negative] : Endocrine [FreeTextEntry5] : HTN, HLD  [FreeTextEntry9] : Hammer toes [de-identified] : Fungal , dry skin with cracking and atopic dermatitis  [de-identified] : alzheimers  , accompanied by his brother

## 2021-04-06 NOTE — PHYSICAL EXAM
[No Acute Distress] : no acute distress [Well Nourished] : well nourished [Well Developed] : well developed [Well-Appearing] : well-appearing [Normal Sclera/Conjunctiva] : normal sclera/conjunctiva [PERRL] : pupils equal round and reactive to light [EOMI] : extraocular movements intact [Normal Outer Ear/Nose] : the outer ears and nose were normal in appearance [Normal Oropharynx] : the oropharynx was normal [No JVD] : no jugular venous distention [No Lymphadenopathy] : no lymphadenopathy [Supple] : supple [Thyroid Normal, No Nodules] : the thyroid was normal and there were no nodules present [No Respiratory Distress] : no respiratory distress  [No Accessory Muscle Use] : no accessory muscle use [Clear to Auscultation] : lungs were clear to auscultation bilaterally [Normal Rate] : normal rate  [Regular Rhythm] : with a regular rhythm [Normal S1, S2] : normal S1 and S2 [No Murmur] : no murmur heard [No Carotid Bruits] : no carotid bruits [No Abdominal Bruit] : a ~M bruit was not heard ~T in the abdomen [No Varicosities] : no varicosities [Pedal Pulses Present] : the pedal pulses are present [No Edema] : there was no peripheral edema [No Palpable Aorta] : no palpable aorta [No Extremity Clubbing/Cyanosis] : no extremity clubbing/cyanosis [Soft] : abdomen soft [Non Tender] : non-tender [Non-distended] : non-distended [No Masses] : no abdominal mass palpated [No HSM] : no HSM [Normal Bowel Sounds] : normal bowel sounds [Normal Posterior Cervical Nodes] : no posterior cervical lymphadenopathy [Normal Anterior Cervical Nodes] : no anterior cervical lymphadenopathy [No CVA Tenderness] : no CVA  tenderness [No Spinal Tenderness] : no spinal tenderness [No Joint Swelling] : no joint swelling [Grossly Normal Strength/Tone] : grossly normal strength/tone [No Rash] : no rash [Coordination Grossly Intact] : coordination grossly intact [No Focal Deficits] : no focal deficits [Normal Gait] : normal gait [Deep Tendon Reflexes (DTR)] : deep tendon reflexes were 2+ and symmetric [Normal Affect] : the affect was normal [Normal Insight/Judgement] : insight and judgment were intact [de-identified] : nonverbal [de-identified] : nonverbal to commands

## 2021-04-06 NOTE — HISTORY OF PRESENT ILLNESS
[Family Member] : family member [FreeTextEntry1] : FOr CPE [de-identified] : For CPE\par history of dementia severe, here with his brother\par he uses adult diapers, patient is not verbal\par brother denies any new pimples

## 2021-04-06 NOTE — ASSESSMENT
[Verbal] : Verbal [Demo] : Demo [Family member] : Family member [Good - alert, interested, motivated] : Good - alert, interested, motivated [Verbalizes knowledge/Understanding] : Verbalizes knowledge/understanding [Foot Care] : foot care [Skin Care] : skin care [Pressure relief] : pressure relief [Signs and symptoms of infection] : sign and symptoms of infection [How and When to Call] : how and when to call [Off-loading] : off-loading [Patient responsibility to plan of care] : patient responsibility to plan of care [] : Yes [Stable] : stable [Home] : Home [Wheelchair] : Wheelchair [FreeTextEntry2] : Promote optimal skin integrity\par  [FreeTextEntry4] : Pt's Brother instructed by Dr Hwang to wash bilateral feet with [place] sponge,  apply Lotrisone to localized irritation areas t/ continue with Ketoconazole (as had been prescribed by Pt's Podiatrist) to intact skin bilateral Feet and may apply Bag Balm to dry skin bilateral Feet & wear white cotton socks- Pt's Brother verbalizes understanding\par \par Orthotist consult next visit for evaluation of orthotics for bilateral feet.  Nurse manager to arrange to have [facility], orthotist see patient next visit.\par \par \par fj/u 2 weeks

## 2021-04-06 NOTE — ASSESSMENT
[FreeTextEntry1] : advanced dementia progressive\par non verbal\par continue meds\par bp stable\par followup 6months \par received vaccine for covid

## 2021-04-06 NOTE — PHYSICAL EXAM
[1+] : left 1+ [Ankle Swelling (On Exam)] : present [Varicose Veins Of Lower Extremities] : bilaterally [Ankle Swelling On The Right] : mild [] : not present [Purpura] : no purpura  [Petechiae] : no petechiae [Skin Ulcer] : no ulcer [Alert] : alert [Oriented to Person] : disoriented to person [Oriented to Place] : disoriented to place [Oriented to Time] : disoriented to time [Calm] : calm [de-identified] : calm [de-identified] : HTN, HLD [de-identified] : hammer toes  [de-identified] : Dry tinea with cracking of skin , atopic dermatitis right dorsal foot  [de-identified] : alzheimers  [de-identified] : DPM removed callus and applied Lostrisone [FreeTextEntry1] : Right Foot- localized irritation- No open wounds/ skin intact [de-identified] : Lotrisone [de-identified] : NSC\par  [de-identified] : DPM removed callus and applied Lostrisone [FreeTextEntry7] : Left Foot- No open wounds/ skin intact, mild callus areas [de-identified] : Lotrisone [de-identified] : NSC [de-identified] : CIRCULATION\par Dorsalis Pedis: R palpable  L palpable\par Posterior Tibialis: R palpable L palpable\par Extremity Color: Pigmented\par Extremity Temperature: Warm\par Capillary Refill: < 3 seconds bilaterally\par Vascular studies not ordered by Dr Hwang\par \par \par  [TWNoteComboBox4] : None [de-identified] : None

## 2021-04-07 ENCOUNTER — NON-APPOINTMENT (OUTPATIENT)
Age: 61
End: 2021-04-07

## 2021-04-07 LAB
25(OH)D3 SERPL-MCNC: 20.5 NG/ML
ALBUMIN SERPL ELPH-MCNC: 4.1 G/DL
ALP BLD-CCNC: 91 U/L
ALT SERPL-CCNC: 16 U/L
ANION GAP SERPL CALC-SCNC: 10 MMOL/L
AST SERPL-CCNC: 15 U/L
BASOPHILS # BLD AUTO: 0.04 K/UL
BASOPHILS NFR BLD AUTO: 0.6 %
BILIRUB SERPL-MCNC: 0.2 MG/DL
BUN SERPL-MCNC: 16 MG/DL
CALCIUM SERPL-MCNC: 9.9 MG/DL
CHLORIDE SERPL-SCNC: 105 MMOL/L
CHOLEST SERPL-MCNC: 141 MG/DL
CO2 SERPL-SCNC: 29 MMOL/L
CREAT SERPL-MCNC: 1.08 MG/DL
EOSINOPHIL # BLD AUTO: 0.37 K/UL
EOSINOPHIL NFR BLD AUTO: 5.7 %
ESTIMATED AVERAGE GLUCOSE: 108 MG/DL
GLUCOSE SERPL-MCNC: 85 MG/DL
HBA1C MFR BLD HPLC: 5.4 %
HCT VFR BLD CALC: 46.4 %
HDLC SERPL-MCNC: 37 MG/DL
HGB BLD-MCNC: 14.4 G/DL
IMM GRANULOCYTES NFR BLD AUTO: 0.3 %
LDLC SERPL CALC-MCNC: 83 MG/DL
LYMPHOCYTES # BLD AUTO: 1.32 K/UL
LYMPHOCYTES NFR BLD AUTO: 20.3 %
MAN DIFF?: NORMAL
MCHC RBC-ENTMCNC: 29.9 PG
MCHC RBC-ENTMCNC: 31 GM/DL
MCV RBC AUTO: 96.5 FL
MONOCYTES # BLD AUTO: 0.63 K/UL
MONOCYTES NFR BLD AUTO: 9.7 %
NEUTROPHILS # BLD AUTO: 4.11 K/UL
NEUTROPHILS NFR BLD AUTO: 63.4 %
NONHDLC SERPL-MCNC: 104 MG/DL
PLATELET # BLD AUTO: 238 K/UL
POTASSIUM SERPL-SCNC: 4.6 MMOL/L
PROT SERPL-MCNC: 7.4 G/DL
PSA SERPL-MCNC: 0.41 NG/ML
RBC # BLD: 4.81 M/UL
RBC # FLD: 12.5 %
SODIUM SERPL-SCNC: 144 MMOL/L
T4 FREE SERPL-MCNC: 1.2 NG/DL
TRIGL SERPL-MCNC: 104 MG/DL
TSH SERPL-ACNC: 1.94 UIU/ML
WBC # FLD AUTO: 6.49 K/UL

## 2021-04-12 ENCOUNTER — RX RENEWAL (OUTPATIENT)
Age: 61
End: 2021-04-12

## 2021-04-16 ENCOUNTER — APPOINTMENT (OUTPATIENT)
Dept: PODIATRY | Facility: HOSPITAL | Age: 61
End: 2021-04-16
Payer: MEDICARE

## 2021-04-16 ENCOUNTER — OUTPATIENT (OUTPATIENT)
Dept: OUTPATIENT SERVICES | Facility: HOSPITAL | Age: 61
LOS: 1 days | Discharge: HOME | End: 2021-04-16
Payer: MEDICARE

## 2021-04-16 ENCOUNTER — APPOINTMENT (OUTPATIENT)
Dept: SURGERY | Facility: HOSPITAL | Age: 61
End: 2021-04-16

## 2021-04-16 VITALS
SYSTOLIC BLOOD PRESSURE: 126 MMHG | HEART RATE: 71 BPM | RESPIRATION RATE: 18 BRPM | DIASTOLIC BLOOD PRESSURE: 83 MMHG | HEIGHT: 67 IN | TEMPERATURE: 96.9 F | OXYGEN SATURATION: 99 % | BODY MASS INDEX: 25.74 KG/M2 | WEIGHT: 164 LBS

## 2021-04-16 DIAGNOSIS — B35.3 TINEA PEDIS: ICD-10-CM

## 2021-04-16 DIAGNOSIS — Z90.79 ACQUIRED ABSENCE OF OTHER GENITAL ORGAN(S): Chronic | ICD-10-CM

## 2021-04-16 DIAGNOSIS — Z98.890 OTHER SPECIFIED POSTPROCEDURAL STATES: Chronic | ICD-10-CM

## 2021-04-16 DIAGNOSIS — S91.309A UNSPECIFIED OPEN WOUND, UNSPECIFIED FOOT, INITIAL ENCOUNTER: ICD-10-CM

## 2021-04-16 DIAGNOSIS — L20.89 OTHER ATOPIC DERMATITIS: ICD-10-CM

## 2021-04-16 PROCEDURE — G0463: CPT

## 2021-04-16 PROCEDURE — 99213 OFFICE O/P EST LOW 20 MIN: CPT

## 2021-04-17 DIAGNOSIS — Z88.3 ALLERGY STATUS TO OTHER ANTI-INFECTIVE AGENTS: ICD-10-CM

## 2021-04-17 DIAGNOSIS — G30.9 ALZHEIMER'S DISEASE, UNSPECIFIED: ICD-10-CM

## 2021-04-17 DIAGNOSIS — R32 UNSPECIFIED URINARY INCONTINENCE: ICD-10-CM

## 2021-04-17 DIAGNOSIS — L84 CORNS AND CALLOSITIES: ICD-10-CM

## 2021-04-17 DIAGNOSIS — Z86.73 PERSONAL HISTORY OF TRANSIENT ISCHEMIC ATTACK (TIA), AND CEREBRAL INFARCTION WITHOUT RESIDUAL DEFICITS: ICD-10-CM

## 2021-04-17 DIAGNOSIS — I10 ESSENTIAL (PRIMARY) HYPERTENSION: ICD-10-CM

## 2021-04-17 DIAGNOSIS — E78.5 HYPERLIPIDEMIA, UNSPECIFIED: ICD-10-CM

## 2021-04-17 DIAGNOSIS — L20.89 OTHER ATOPIC DERMATITIS: ICD-10-CM

## 2021-04-17 DIAGNOSIS — Z79.899 OTHER LONG TERM (CURRENT) DRUG THERAPY: ICD-10-CM

## 2021-04-17 DIAGNOSIS — Z82.49 FAMILY HISTORY OF ISCHEMIC HEART DISEASE AND OTHER DISEASES OF THE CIRCULATORY SYSTEM: ICD-10-CM

## 2021-04-17 DIAGNOSIS — B35.3 TINEA PEDIS: ICD-10-CM

## 2021-04-17 DIAGNOSIS — F02.80 DEMENTIA IN OTHER DISEASES CLASSIFIED ELSEWHERE, UNSPECIFIED SEVERITY, WITHOUT BEHAVIORAL DISTURBANCE, PSYCHOTIC DISTURBANCE, MOOD DISTURBANCE, AND ANXIETY: ICD-10-CM

## 2021-04-19 ENCOUNTER — NON-APPOINTMENT (OUTPATIENT)
Age: 61
End: 2021-04-19

## 2021-04-23 ENCOUNTER — APPOINTMENT (OUTPATIENT)
Dept: INTERNAL MEDICINE | Facility: CLINIC | Age: 61
End: 2021-04-23

## 2021-05-04 NOTE — ASSESSMENT
[FreeTextEntry2] : Maintain Skin Integrity \par  [FreeTextEntry4] : Pt's brother told he may continue to apply Bag Balm to dry skin bilateral Feet & wear white cotton socks- Pt's Brother verbalizes understanding\par Orthotist consult next visit for evaluation of orthotics for bilateral feet.  Juno is unable to do anything for Pt at this time.\par Pt Discharged from [facility] \par \par \par fj/u 2 weeks

## 2021-05-04 NOTE — HISTORY OF PRESENT ILLNESS
The patient is advised to continue on the same medications  [FreeTextEntry1] : Dry tinea and atopic dermatitis bilateral feet , improved

## 2021-05-04 NOTE — REVIEW OF SYSTEMS
[Skin Lesions] : skin lesion [Negative] : Endocrine [Fever] : no fever [Chills] : no chills [Eye Pain] : no eye pain [Shortness Of Breath] : no shortness of breath [Abdominal Pain] : no abdominal pain [Anxiety] : no anxiety [Easy Bleeding] : no tendency for easy bleeding [FreeTextEntry5] : HTN, HLD  [FreeTextEntry9] : Hammer toes [de-identified] : Fungal , dry skin with cracking and atopic dermatitis  [de-identified] : alzheimers  , accompanied by his brother

## 2021-05-04 NOTE — PHYSICAL EXAM
[1+] : left 1+ [Ankle Swelling (On Exam)] : present [Varicose Veins Of Lower Extremities] : bilaterally [Ankle Swelling On The Right] : mild [Alert] : alert [Calm] : calm [] : not present [Purpura] : no purpura  [Petechiae] : no petechiae [Skin Ulcer] : no ulcer [Oriented to Person] : disoriented to person [Oriented to Place] : disoriented to place [Oriented to Time] : disoriented to time [de-identified] : calm [de-identified] : HTN, HLD [de-identified] : hammer toes  [de-identified] : Dry tinea with cracking of skin , atopic dermatitis right dorsal foot  [de-identified] : alzheimers  [de-identified] : DPM removed callus and applied Lostrisone [FreeTextEntry1] : Right Foot- localized irritation- No open wounds/ skin intact [de-identified] : Cleansed with Normal Saline. \par  [de-identified] : DPM removed callus and applied Lostrisone [FreeTextEntry7] : Left Foot- No open wounds/ skin intact, mild callus areas [de-identified] : Cleansed with Normal Saline.  [de-identified] : CIRCULATION\par Dorsalis Pedis: R palpable  L palpable\par Posterior Tibialis: R palpable L palpable\par Extremity Color: Pigmented\par Extremity Temperature: Warm\par Capillary Refill: < 3 seconds bilaterally\par Vascular studies not ordered by Dr Hwang\par \par \par  [TWNoteComboBox4] : None [de-identified] : None

## 2021-05-04 NOTE — PLAN
[FreeTextEntry1] : Patient much improved , discharged , can f/u with outside podiatrist for maintenance care Spent 20 minutes for patient care and medical decision making.  The patient would benefit from custom orthopedic shoes and custom foot orthotics to prevent future break down and limit exposure to limb loss \par

## 2021-06-18 NOTE — ED PROVIDER NOTE - ATTESTATION, MLM
Call transferred from [place], [place].  Pt states having right nipple discharge that started this morning.  Patient states she was going to shower and noticed that her right nipple appeared more open and she started to apply pressure and was having a white discharge.  Patient denies pain, fevers, redness or inflammation.  Patient states that both breasts appear symmetrical.  Patient states that this is the first time this happens. Patient has nexplanon as contraceptive that was recently inserted, therefore, patient is not pregnant and has never been pregnant.    Discussed patient problem with Dr. Christensen in person and patient scheduled to be seen next week.   I have reviewed and confirmed nurses' notes for patient's medications, allergies, medical history, and surgical history.

## 2021-07-05 ENCOUNTER — EMERGENCY (EMERGENCY)
Facility: HOSPITAL | Age: 61
LOS: 1 days | Discharge: HOME | End: 2021-07-05
Attending: STUDENT IN AN ORGANIZED HEALTH CARE EDUCATION/TRAINING PROGRAM
Payer: MEDICARE

## 2021-07-05 ENCOUNTER — RX RENEWAL (OUTPATIENT)
Age: 61
End: 2021-07-05

## 2021-07-05 VITALS
HEART RATE: 80 BPM | RESPIRATION RATE: 19 BRPM | TEMPERATURE: 98 F | HEIGHT: 67 IN | SYSTOLIC BLOOD PRESSURE: 153 MMHG | DIASTOLIC BLOOD PRESSURE: 97 MMHG | OXYGEN SATURATION: 99 % | WEIGHT: 195.11 LBS

## 2021-07-05 DIAGNOSIS — Z90.79 ACQUIRED ABSENCE OF OTHER GENITAL ORGAN(S): Chronic | ICD-10-CM

## 2021-07-05 DIAGNOSIS — Z98.890 OTHER SPECIFIED POSTPROCEDURAL STATES: Chronic | ICD-10-CM

## 2021-07-05 PROCEDURE — 12052 INTMD RPR FACE/MM 2.6-5.0 CM: CPT

## 2021-07-05 PROCEDURE — 70450 CT HEAD/BRAIN W/O DYE: CPT

## 2021-07-05 PROCEDURE — 99284 EMERGENCY DEPT VISIT MOD MDM: CPT | Mod: 25

## 2021-07-05 PROCEDURE — 70450 CT HEAD/BRAIN W/O DYE: CPT | Mod: 26,MA

## 2021-07-05 PROCEDURE — 12011 RPR F/E/E/N/L/M 2.5 CM/<: CPT

## 2021-07-05 RX ORDER — TETANUS TOXOID, REDUCED DIPHTHERIA TOXOID AND ACELLULAR PERTUSSIS VACCINE, ADSORBED 5; 2.5; 8; 8; 2.5 [IU]/.5ML; [IU]/.5ML; UG/.5ML; UG/.5ML; UG/.5ML
0.5 SUSPENSION INTRAMUSCULAR ONCE
Refills: 0 | Status: COMPLETED | OUTPATIENT
Start: 2021-07-05 | End: 2021-07-05

## 2021-07-05 NOTE — ED PROVIDER NOTE - PHYSICAL EXAMINATION
General: well appearing, interactive, well nourished, NAD  HEENT: 3 cm laceration to right forehead above brow, pupils equal and reactive, normal external ears bilaterally   Cardiac: RRR, no MRG appreciated  Resp: lungs clear to auscultation bilaterally, symmetric chest wall rise  Abd: soft, nontender, nondistended,   : no CVA tenderness  Neuro: Moving all extremities  Skin:  normal color for race

## 2021-07-05 NOTE — ED PROVIDER NOTE - OBJECTIVE STATEMENT
Pt is a 60 y/o M w/PMHx CVS, vascular dementia presents c/o fall.  Per brother at bedside Pt was walking with a wooden stick and tripped over the stick and fell to his knees and then onto his forehead.  Pt was helped up and found to have a laceration to his right forehead.  Per brother he is at his baseline mental status. Pt is a 60 y/o M w/PMHx CVA, vascular dementia presents c/o fall.  Per brother at bedside Pt was walking with a wooden stick and tripped over the stick and fell to his knees and then onto his forehead.  Pt was helped up and found to have a laceration to his right forehead.  Per brother he is at his baseline mental status.

## 2021-07-05 NOTE — ED PROVIDER NOTE - PATIENT PORTAL LINK FT
You can access the FollowMyHealth Patient Portal offered by [facility] by registering at the following website: [email or web address]. By joining [employer]’s FollowMyHealth portal, you will also be able to view your health information using other applications (apps) compatible with our system.

## 2021-07-05 NOTE — ED PROCEDURE NOTE - CPROC ED INFORMED CONSENT1
from brother/Benefits, risks, and possible complications of procedure explained to patient/caregiver who verbalized understanding and gave verbal consent.

## 2021-07-05 NOTE — ED PROVIDER NOTE - NSFOLLOWUPINSTRUCTIONS_ED_ALL_ED_FT
Do not get the area wet for 2 days.    Stitches, Staples, or Adhesive Wound Closure  Doctors use stitches (sutures), staples, and certain glue (skin adhesives) to hold your skin together while it heals (wound closure). You may need this treatment after you have surgery or if you cut your skin accidentally. These methods help your skin heal more quickly. They also make it less likely that you will have a scar.    What are the different kinds of wound closures?  There are many options for wound closure. The one that your doctor uses depends on how deep and large your wound is.    Sutures     Sutures are the oldest method of wound closure. Sutures can be made from natural or synthetic materials. They can be made from a material that your body can break down as your wound heals (absorbable), or they can be made from a material that needs to be removed from your skin (nonabsorbable). They come in many different strengths and sizes.    Your doctor attaches the sutures to a steel needle on one end. Sutures can be passed through your skin, or through the tissues beneath your skin. Then they are tied and cut. Your skin edges may be closed in one continuous stitch or in separate stitches.    Sutures are strong and can be used for all kinds of wounds. Absorbable sutures may be used to close tissues under the skin. The disadvantage of sutures is that they may cause skin reactions that lead to infection. Nonabsorbable sutures need to be removed.    YOUR WOUND NEEDS FOLLOW UP FOR A WOUND CHECK, SUTURE REMOVAL OR STAPLE REMOVAL IN  7 DAYS    IF YOU HAD SUTURES WERE PLACED TODAY:  5 SUTURES WERE PLACED  When should I seek help for my wound closure?  Contact your doctor if:    You have a fever.  You have chills.  You have redness, puffiness (swelling), or pain at the site of your wound.  You have fluid, blood, or pus coming from your wound.  There is a bad smell coming from your wound.  The skin edges of your wound start to separate after your sutures have been removed.  Your wound becomes thick, raised, and darker in color after your sutures come out (scarring).    This information is not intended to replace advice given to you by your health care provider. Make sure you discuss any questions you have with your health care provider.

## 2021-07-05 NOTE — ED PROVIDER NOTE - PROGRESS NOTE DETAILS
Pt reassessed with brother at bedside, vss. Discussed in depth and explained to pt in depth the next steps that need to be taking including proper follow up with PCP or specialists. All incidental findings were discussed with pt as well. Pt verbalized their concerns and all questions were answered. Pt understands dispo and wants discharge. Given good instructions when to return to ED and importance of f/u.

## 2021-07-05 NOTE — ED PROVIDER NOTE - ATTENDING CONTRIBUTION TO CARE
60yo male with pmh of CVA and vascular dementia presents s/p fall. As per brother pt was walking with a wooden stick and tripped over it and fell over. No loc and as per brother pt at baseline mental status  General: well appearing, interactive, well nourished, NAD  HEENT: 3 cm laceration to right forehead above brow, pupils equal and reactive, normal external ears bilaterally   Cardiac: RRR, no MRG appreciated  Resp: lungs clear to auscultation bilaterally, symmetric chest wall rise  Abd: soft, nontender, nondistended,   : no CVA tenderness  Neuro: Moving all extremities  Skin:  normal color for race  CTH with no acute traumatic injuries, at baseline, lac repair

## 2021-07-05 NOTE — ED ADULT TRIAGE NOTE - CHIEF COMPLAINT QUOTE
Pt. BIBA s/p witnessed fall at home.  Pt. with history of vascular dementia, CVA and non verbal at baseline.  Pt. at baseline unable to walk on own; as per EMS, pt brother witnessed fall and pt. fell forward to ground.  Negative LOC; negative blood thinners.  Laceration to right eye and abrasions to bilateral knees.  Pt. awake and alert in triage.  Brother en route to [facility] to provide further information

## 2021-07-06 NOTE — ED ADULT NURSE NOTE - OBJECTIVE STATEMENT
Pt is a 60 y/o M w/PMHx CVA, vascular dementia presents c/o fall.  Per brother at bedside Pt was walking with a wooden stick and tripped over the stick and fell to his knees and then onto his forehead.  Pt was helped up and found to have a laceration to his right forehead.  Per brother he is at his baseline mental status

## 2021-07-06 NOTE — ED ADULT NURSE NOTE - CHIEF COMPLAINT QUOTE
Pt. BIBA s/p witnessed fall at home.  Pt. with history of vascular dementia, CVA and non verbal at baseline.  Pt. at baseline unable to walk on own; as per EMS, pt brother witnessed fall and pt. fell forward to ground.  Negative LOC; negative blood thinners.  Laceration to right eye and abrasions to bilateral knees.  Pt. awake and alert in triage.  Brother en route to [facility] to provide further information

## 2021-08-04 ENCOUNTER — APPOINTMENT (OUTPATIENT)
Dept: INTERNAL MEDICINE | Facility: CLINIC | Age: 61
End: 2021-08-04

## 2021-08-27 ENCOUNTER — RX RENEWAL (OUTPATIENT)
Age: 61
End: 2021-08-27

## 2021-08-31 ENCOUNTER — APPOINTMENT (OUTPATIENT)
Dept: INTERNAL MEDICINE | Facility: CLINIC | Age: 61
End: 2021-08-31
Payer: MEDICARE

## 2021-08-31 VITALS
HEART RATE: 78 BPM | RESPIRATION RATE: 13 BRPM | HEIGHT: 67 IN | BODY MASS INDEX: 25.74 KG/M2 | DIASTOLIC BLOOD PRESSURE: 84 MMHG | SYSTOLIC BLOOD PRESSURE: 130 MMHG | WEIGHT: 164 LBS

## 2021-08-31 DIAGNOSIS — I73.9 PERIPHERAL VASCULAR DISEASE, UNSPECIFIED: ICD-10-CM

## 2021-08-31 PROCEDURE — 90686 IIV4 VACC NO PRSV 0.5 ML IM: CPT

## 2021-08-31 PROCEDURE — G0008: CPT

## 2021-08-31 PROCEDURE — 99214 OFFICE O/P EST MOD 30 MIN: CPT | Mod: 25

## 2021-08-31 NOTE — ASSESSMENT
[FreeTextEntry1] : bp stable\par hld stable\par dementia progresseive\par needs new air mattress\par flu vaccine given\par follow up 6months

## 2021-08-31 NOTE — HISTORY OF PRESENT ILLNESS
[FreeTextEntry1] : follow up  [de-identified] : follow up needs face to face\par needs new air mattress\par patient has advanced dementia\par has htn, hld\par has been taking his meds\par has no new issues\par is here with brother is not verbal

## 2021-09-01 ENCOUNTER — OUTPATIENT (OUTPATIENT)
Dept: OUTPATIENT SERVICES | Facility: HOSPITAL | Age: 61
LOS: 1 days | End: 2021-09-01
Payer: MEDICARE

## 2021-09-01 ENCOUNTER — APPOINTMENT (OUTPATIENT)
Dept: ULTRASOUND IMAGING | Facility: CLINIC | Age: 61
End: 2021-09-01
Payer: MEDICARE

## 2021-09-01 DIAGNOSIS — I73.9 PERIPHERAL VASCULAR DISEASE, UNSPECIFIED: ICD-10-CM

## 2021-09-01 DIAGNOSIS — Z98.890 OTHER SPECIFIED POSTPROCEDURAL STATES: Chronic | ICD-10-CM

## 2021-09-01 DIAGNOSIS — Z90.79 ACQUIRED ABSENCE OF OTHER GENITAL ORGAN(S): Chronic | ICD-10-CM

## 2021-09-01 PROCEDURE — 93925 LOWER EXTREMITY STUDY: CPT | Mod: 26

## 2021-09-01 PROCEDURE — 93925 LOWER EXTREMITY STUDY: CPT

## 2021-09-07 ENCOUNTER — NON-APPOINTMENT (OUTPATIENT)
Age: 61
End: 2021-09-07

## 2021-09-15 ENCOUNTER — EMERGENCY (EMERGENCY)
Facility: HOSPITAL | Age: 61
LOS: 1 days | Discharge: HOME | End: 2021-09-15
Attending: EMERGENCY MEDICINE
Payer: MEDICARE

## 2021-09-15 VITALS
HEIGHT: 67 IN | HEART RATE: 77 BPM | TEMPERATURE: 98 F | RESPIRATION RATE: 16 BRPM | OXYGEN SATURATION: 99 % | DIASTOLIC BLOOD PRESSURE: 72 MMHG | SYSTOLIC BLOOD PRESSURE: 109 MMHG

## 2021-09-15 DIAGNOSIS — Z90.79 ACQUIRED ABSENCE OF OTHER GENITAL ORGAN(S): Chronic | ICD-10-CM

## 2021-09-15 DIAGNOSIS — Z98.890 OTHER SPECIFIED POSTPROCEDURAL STATES: Chronic | ICD-10-CM

## 2021-09-15 LAB
ALBUMIN SERPL ELPH-MCNC: 3.9 G/DL — SIGNIFICANT CHANGE UP (ref 3.3–5.2)
ALP SERPL-CCNC: 93 U/L — SIGNIFICANT CHANGE UP (ref 40–120)
ALT FLD-CCNC: 17 U/L — SIGNIFICANT CHANGE UP
ANION GAP SERPL CALC-SCNC: 10 MMOL/L — SIGNIFICANT CHANGE UP (ref 5–17)
AST SERPL-CCNC: 15 U/L — SIGNIFICANT CHANGE UP
BILIRUB SERPL-MCNC: 0.2 MG/DL — LOW (ref 0.4–2)
BUN SERPL-MCNC: 17.4 MG/DL — SIGNIFICANT CHANGE UP (ref 8–20)
CALCIUM SERPL-MCNC: 9.5 MG/DL — SIGNIFICANT CHANGE UP (ref 8.6–10.2)
CHLORIDE SERPL-SCNC: 104 MMOL/L — SIGNIFICANT CHANGE UP (ref 98–107)
CO2 SERPL-SCNC: 26 MMOL/L — SIGNIFICANT CHANGE UP (ref 22–29)
CREAT SERPL-MCNC: 1.05 MG/DL — SIGNIFICANT CHANGE UP (ref 0.5–1.3)
GLUCOSE SERPL-MCNC: 102 MG/DL — HIGH (ref 70–99)
HCT VFR BLD CALC: 42.7 % — SIGNIFICANT CHANGE UP (ref 39–50)
HGB BLD-MCNC: 13.9 G/DL — SIGNIFICANT CHANGE UP (ref 13–17)
MCHC RBC-ENTMCNC: 29.8 PG — SIGNIFICANT CHANGE UP (ref 27–34)
MCHC RBC-ENTMCNC: 32.6 GM/DL — SIGNIFICANT CHANGE UP (ref 32–36)
MCV RBC AUTO: 91.4 FL — SIGNIFICANT CHANGE UP (ref 80–100)
PLATELET # BLD AUTO: 216 K/UL — SIGNIFICANT CHANGE UP (ref 150–400)
POTASSIUM SERPL-MCNC: 4.6 MMOL/L — SIGNIFICANT CHANGE UP (ref 3.5–5.3)
POTASSIUM SERPL-SCNC: 4.6 MMOL/L — SIGNIFICANT CHANGE UP (ref 3.5–5.3)
PROT SERPL-MCNC: 7.4 G/DL — SIGNIFICANT CHANGE UP (ref 6.6–8.7)
RBC # BLD: 4.67 M/UL — SIGNIFICANT CHANGE UP (ref 4.2–5.8)
RBC # FLD: 11.8 % — SIGNIFICANT CHANGE UP (ref 10.3–14.5)
SODIUM SERPL-SCNC: 139 MMOL/L — SIGNIFICANT CHANGE UP (ref 135–145)
WBC # BLD: 8.53 K/UL — SIGNIFICANT CHANGE UP (ref 3.8–10.5)
WBC # FLD AUTO: 8.53 K/UL — SIGNIFICANT CHANGE UP (ref 3.8–10.5)

## 2021-09-15 PROCEDURE — 93971 EXTREMITY STUDY: CPT | Mod: 26,RT

## 2021-09-15 PROCEDURE — 80053 COMPREHEN METABOLIC PANEL: CPT

## 2021-09-15 PROCEDURE — 99284 EMERGENCY DEPT VISIT MOD MDM: CPT | Mod: 25

## 2021-09-15 PROCEDURE — 93971 EXTREMITY STUDY: CPT

## 2021-09-15 PROCEDURE — 85027 COMPLETE CBC AUTOMATED: CPT

## 2021-09-15 PROCEDURE — 36415 COLL VENOUS BLD VENIPUNCTURE: CPT

## 2021-09-15 PROCEDURE — 99284 EMERGENCY DEPT VISIT MOD MDM: CPT

## 2021-09-15 PROCEDURE — 96374 THER/PROPH/DIAG INJ IV PUSH: CPT

## 2021-09-15 RX ORDER — AMPICILLIN SODIUM AND SULBACTAM SODIUM 250; 125 MG/ML; MG/ML
1.5 INJECTION, POWDER, FOR SUSPENSION INTRAMUSCULAR; INTRAVENOUS ONCE
Refills: 0 | Status: COMPLETED | OUTPATIENT
Start: 2021-09-15 | End: 2021-09-15

## 2021-09-15 RX ADMIN — AMPICILLIN SODIUM AND SULBACTAM SODIUM 200 GRAM(S): 250; 125 INJECTION, POWDER, FOR SUSPENSION INTRAMUSCULAR; INTRAVENOUS at 18:18

## 2021-09-15 NOTE — ED PROVIDER NOTE - PATIENT PORTAL LINK FT
You can access the FollowMyHealth Patient Portal offered by [facility] by registering at the following website: [email or web address]. By joining "[employer]’s FollowMyHealth portal, you will also be able to view your health information using other applications (apps) compatible with our system.

## 2021-09-15 NOTE — ED ADULT NURSE NOTE - OBJECTIVE STATEMENT
Pt nonverbal at baseline, sent in for L foot cellulitis, no acute s/s of respiratory distress noted or reported at this time, will continue to monitor

## 2021-09-15 NOTE — ED PROVIDER NOTE - NSICDXPASTMEDICALHX_GEN_ALL_CORE_FT
PAST MEDICAL HISTORY:  CVA (cerebral vascular accident) 2013    Nonverbal     Vascular dementia

## 2021-09-27 ENCOUNTER — RX RENEWAL (OUTPATIENT)
Age: 61
End: 2021-09-27

## 2021-10-05 ENCOUNTER — APPOINTMENT (OUTPATIENT)
Dept: INTERNAL MEDICINE | Facility: CLINIC | Age: 61
End: 2021-10-05
Payer: MEDICARE

## 2021-10-05 ENCOUNTER — INPATIENT (INPATIENT)
Facility: HOSPITAL | Age: 61
LOS: 2 days | Discharge: HOME | DRG: 603 | End: 2021-10-08
Attending: INTERNAL MEDICINE | Admitting: FAMILY MEDICINE
Payer: MEDICARE

## 2021-10-05 VITALS
HEIGHT: 67 IN | RESPIRATION RATE: 18 BRPM | SYSTOLIC BLOOD PRESSURE: 138 MMHG | DIASTOLIC BLOOD PRESSURE: 69 MMHG | TEMPERATURE: 99 F | HEART RATE: 66 BPM | WEIGHT: 169.98 LBS | OXYGEN SATURATION: 99 %

## 2021-10-05 VITALS — DIASTOLIC BLOOD PRESSURE: 78 MMHG | SYSTOLIC BLOOD PRESSURE: 128 MMHG | HEART RATE: 70 BPM | RESPIRATION RATE: 16 BRPM

## 2021-10-05 VITALS — BODY MASS INDEX: 26 KG/M2 | WEIGHT: 166 LBS

## 2021-10-05 DIAGNOSIS — R09.89 OTHER SPECIFIED SYMPTOMS AND SIGNS INVOLVING THE CIRCULATORY AND RESPIRATORY SYSTEMS: ICD-10-CM

## 2021-10-05 DIAGNOSIS — Z98.890 OTHER SPECIFIED POSTPROCEDURAL STATES: Chronic | ICD-10-CM

## 2021-10-05 DIAGNOSIS — L03.119 CELLULITIS OF UNSPECIFIED PART OF LIMB: ICD-10-CM

## 2021-10-05 DIAGNOSIS — Z90.79 ACQUIRED ABSENCE OF OTHER GENITAL ORGAN(S): Chronic | ICD-10-CM

## 2021-10-05 LAB
ALBUMIN SERPL ELPH-MCNC: 3.8 G/DL — SIGNIFICANT CHANGE UP (ref 3.3–5.2)
ALP SERPL-CCNC: 105 U/L — SIGNIFICANT CHANGE UP (ref 40–120)
ALT FLD-CCNC: 17 U/L — SIGNIFICANT CHANGE UP
ANION GAP SERPL CALC-SCNC: 11 MMOL/L — SIGNIFICANT CHANGE UP (ref 5–17)
AST SERPL-CCNC: 17 U/L — SIGNIFICANT CHANGE UP
BASOPHILS # BLD AUTO: 0.06 K/UL — SIGNIFICANT CHANGE UP (ref 0–0.2)
BASOPHILS NFR BLD AUTO: 0.7 % — SIGNIFICANT CHANGE UP (ref 0–2)
BILIRUB SERPL-MCNC: 0.2 MG/DL — LOW (ref 0.4–2)
BUN SERPL-MCNC: 16.9 MG/DL — SIGNIFICANT CHANGE UP (ref 8–20)
CALCIUM SERPL-MCNC: 9.3 MG/DL — SIGNIFICANT CHANGE UP (ref 8.6–10.2)
CHLORIDE SERPL-SCNC: 106 MMOL/L — SIGNIFICANT CHANGE UP (ref 98–107)
CO2 SERPL-SCNC: 24 MMOL/L — SIGNIFICANT CHANGE UP (ref 22–29)
CREAT SERPL-MCNC: 1.03 MG/DL — SIGNIFICANT CHANGE UP (ref 0.5–1.3)
EOSINOPHIL # BLD AUTO: 1.3 K/UL — HIGH (ref 0–0.5)
EOSINOPHIL NFR BLD AUTO: 15.9 % — HIGH (ref 0–6)
GLUCOSE SERPL-MCNC: 89 MG/DL — SIGNIFICANT CHANGE UP (ref 70–99)
HCT VFR BLD CALC: 45.6 % — SIGNIFICANT CHANGE UP (ref 39–50)
HGB BLD-MCNC: 14.8 G/DL — SIGNIFICANT CHANGE UP (ref 13–17)
IMM GRANULOCYTES NFR BLD AUTO: 0.2 % — SIGNIFICANT CHANGE UP (ref 0–1.5)
LACTATE BLDV-MCNC: 1 MMOL/L — SIGNIFICANT CHANGE UP (ref 0.5–2)
LYMPHOCYTES # BLD AUTO: 1.5 K/UL — SIGNIFICANT CHANGE UP (ref 1–3.3)
LYMPHOCYTES # BLD AUTO: 18.3 % — SIGNIFICANT CHANGE UP (ref 13–44)
MCHC RBC-ENTMCNC: 29.5 PG — SIGNIFICANT CHANGE UP (ref 27–34)
MCHC RBC-ENTMCNC: 32.5 GM/DL — SIGNIFICANT CHANGE UP (ref 32–36)
MCV RBC AUTO: 90.8 FL — SIGNIFICANT CHANGE UP (ref 80–100)
MONOCYTES # BLD AUTO: 0.62 K/UL — SIGNIFICANT CHANGE UP (ref 0–0.9)
MONOCYTES NFR BLD AUTO: 7.6 % — SIGNIFICANT CHANGE UP (ref 2–14)
NEUTROPHILS # BLD AUTO: 4.68 K/UL — SIGNIFICANT CHANGE UP (ref 1.8–7.4)
NEUTROPHILS NFR BLD AUTO: 57.3 % — SIGNIFICANT CHANGE UP (ref 43–77)
PLATELET # BLD AUTO: 240 K/UL — SIGNIFICANT CHANGE UP (ref 150–400)
POTASSIUM SERPL-MCNC: 4.6 MMOL/L — SIGNIFICANT CHANGE UP (ref 3.5–5.3)
POTASSIUM SERPL-SCNC: 4.6 MMOL/L — SIGNIFICANT CHANGE UP (ref 3.5–5.3)
PROT SERPL-MCNC: 7.6 G/DL — SIGNIFICANT CHANGE UP (ref 6.6–8.7)
RBC # BLD: 5.02 M/UL — SIGNIFICANT CHANGE UP (ref 4.2–5.8)
RBC # FLD: 11.9 % — SIGNIFICANT CHANGE UP (ref 10.3–14.5)
SODIUM SERPL-SCNC: 141 MMOL/L — SIGNIFICANT CHANGE UP (ref 135–145)
WBC # BLD: 8.18 K/UL — SIGNIFICANT CHANGE UP (ref 3.8–10.5)
WBC # FLD AUTO: 8.18 K/UL — SIGNIFICANT CHANGE UP (ref 3.8–10.5)

## 2021-10-05 PROCEDURE — 99214 OFFICE O/P EST MOD 30 MIN: CPT

## 2021-10-05 PROCEDURE — 73610 X-RAY EXAM OF ANKLE: CPT | Mod: 26,RT

## 2021-10-05 PROCEDURE — 99285 EMERGENCY DEPT VISIT HI MDM: CPT

## 2021-10-05 PROCEDURE — 93970 EXTREMITY STUDY: CPT | Mod: 26

## 2021-10-05 RX ORDER — LACTOBACILLUS ACIDOPHILUS 100MM CELL
1 CAPSULE ORAL
Refills: 0 | Status: DISCONTINUED | OUTPATIENT
Start: 2021-10-05 | End: 2021-10-08

## 2021-10-05 RX ORDER — PIPERACILLIN AND TAZOBACTAM 4; .5 G/20ML; G/20ML
3.38 INJECTION, POWDER, LYOPHILIZED, FOR SOLUTION INTRAVENOUS EVERY 8 HOURS
Refills: 0 | Status: DISCONTINUED | OUTPATIENT
Start: 2021-10-05 | End: 2021-10-06

## 2021-10-05 RX ORDER — ENOXAPARIN SODIUM 100 MG/ML
40 INJECTION SUBCUTANEOUS DAILY
Refills: 0 | Status: DISCONTINUED | OUTPATIENT
Start: 2021-10-05 | End: 2021-10-07

## 2021-10-05 RX ORDER — PIPERACILLIN AND TAZOBACTAM 4; .5 G/20ML; G/20ML
3.38 INJECTION, POWDER, LYOPHILIZED, FOR SOLUTION INTRAVENOUS ONCE
Refills: 0 | Status: COMPLETED | OUTPATIENT
Start: 2021-10-05 | End: 2021-10-05

## 2021-10-05 RX ORDER — SODIUM CHLORIDE 9 MG/ML
1000 INJECTION, SOLUTION INTRAVENOUS ONCE
Refills: 0 | Status: COMPLETED | OUTPATIENT
Start: 2021-10-05 | End: 2021-10-05

## 2021-10-05 RX ORDER — VANCOMYCIN HCL 1 G
1250 VIAL (EA) INTRAVENOUS ONCE
Refills: 0 | Status: COMPLETED | OUTPATIENT
Start: 2021-10-05 | End: 2021-10-05

## 2021-10-05 RX ORDER — SENNA PLUS 8.6 MG/1
2 TABLET ORAL AT BEDTIME
Refills: 0 | Status: DISCONTINUED | OUTPATIENT
Start: 2021-10-05 | End: 2021-10-08

## 2021-10-05 RX ORDER — LACTULOSE 10 G/15ML
10 SOLUTION ORAL THREE TIMES A DAY
Refills: 0 | Status: DISCONTINUED | OUTPATIENT
Start: 2021-10-05 | End: 2021-10-08

## 2021-10-05 RX ADMIN — PIPERACILLIN AND TAZOBACTAM 200 GRAM(S): 4; .5 INJECTION, POWDER, LYOPHILIZED, FOR SOLUTION INTRAVENOUS at 18:34

## 2021-10-05 RX ADMIN — SENNA PLUS 2 TABLET(S): 8.6 TABLET ORAL at 23:41

## 2021-10-05 RX ADMIN — SODIUM CHLORIDE 1000 MILLILITER(S): 9 INJECTION, SOLUTION INTRAVENOUS at 18:09

## 2021-10-05 RX ADMIN — Medication 110 MILLIGRAM(S): at 14:47

## 2021-10-05 RX ADMIN — PIPERACILLIN AND TAZOBACTAM 25 GRAM(S): 4; .5 INJECTION, POWDER, LYOPHILIZED, FOR SOLUTION INTRAVENOUS at 23:41

## 2021-10-05 RX ADMIN — Medication 100 MILLIGRAM(S): at 18:09

## 2021-10-05 RX ADMIN — Medication 166.67 MILLIGRAM(S): at 20:04

## 2021-10-05 NOTE — H&P ADULT - NSHPLABSRESULTS_GEN_ALL_CORE
62 y/o male who is non verbal at baseline, with h/o vascular dementia which started 10 years ago and he has been non verbal since then was brought in for RLE cellulitis with failed out-pt therapy, on going for past few weeks, pt's brother not sure how it started, no fever. no other complaints. no cp, no sob, no abd. pain. no n/v/d. As per pt's brother he has no swallowing difficulty and not using any medicines on daily basis.    - rt. foot cellulitis, will give one dose of vancomycin and keep on zosyn, elevation of rt. lower ext. skin break between rt. toes might have contributed to cellulitis. ID consult.    - Tinea pedis, will keep on antifungal.     - H/o vascular dementia, at baseline, enhanced supervision requested.    - dvt prophylaxis, subcut lovenox 40 mg daily.

## 2021-10-05 NOTE — PHYSICAL EXAM
[No Acute Distress] : no acute distress [Well Nourished] : well nourished [Well Developed] : well developed [Well-Appearing] : well-appearing [Normal Sclera/Conjunctiva] : normal sclera/conjunctiva [PERRL] : pupils equal round and reactive to light [EOMI] : extraocular movements intact [Normal Outer Ear/Nose] : the outer ears and nose were normal in appearance [Normal Oropharynx] : the oropharynx was normal [No JVD] : no jugular venous distention [No Lymphadenopathy] : no lymphadenopathy [Supple] : supple [No Respiratory Distress] : no respiratory distress  [Thyroid Normal, No Nodules] : the thyroid was normal and there were no nodules present [No Accessory Muscle Use] : no accessory muscle use [Clear to Auscultation] : lungs were clear to auscultation bilaterally [Normal Rate] : normal rate  [Regular Rhythm] : with a regular rhythm [Normal S1, S2] : normal S1 and S2 [No Murmur] : no murmur heard [No Carotid Bruits] : no carotid bruits [No Abdominal Bruit] : a ~M bruit was not heard ~T in the abdomen [No Varicosities] : no varicosities [Pedal Pulses Present] : the pedal pulses are present [No Edema] : there was no peripheral edema [No Palpable Aorta] : no palpable aorta [No Extremity Clubbing/Cyanosis] : no extremity clubbing/cyanosis [Soft] : abdomen soft [Non Tender] : non-tender [Non-distended] : non-distended [No Masses] : no abdominal mass palpated [No HSM] : no HSM [Normal Bowel Sounds] : normal bowel sounds [Normal Posterior Cervical Nodes] : no posterior cervical lymphadenopathy [Normal Anterior Cervical Nodes] : no anterior cervical lymphadenopathy [No CVA Tenderness] : no CVA  tenderness [No Spinal Tenderness] : no spinal tenderness [No Joint Swelling] : no joint swelling [Grossly Normal Strength/Tone] : grossly normal strength/tone [No Rash] : no rash [Coordination Grossly Intact] : coordination grossly intact [No Focal Deficits] : no focal deficits [Normal Gait] : normal gait [Deep Tendon Reflexes (DTR)] : deep tendon reflexes were 2+ and symmetric [Normal Affect] : the affect was normal [Normal Insight/Judgement] : insight and judgment were intact [de-identified] : right cellulitic foot edema, foul smelling areas of excoriations

## 2021-10-05 NOTE — ED PROVIDER NOTE - PHYSICAL EXAMINATION
GEN:  No acute distress.   HEENT: Normocephalic and atraumatic. Clear conjunctiva, non icteric. Moist mucosa. Neck supple. RIP, EOMI.  CV: Normal S1 and S2. No murmurs, rubs, or gallops. Peripheral pulses intact and palpable throughout.   RESP: Clear to auscultation bilaterally. No wheezes or rales. No increased work of breathing.   ABD: Soft, nondistended, nontender. No organomegaly. Bowel sounds presentrf  EXT:  NEURO: Awake and alert, good tone  SKIN: No rashes, warm and well perfused, brisk cap refill GEN:  No acute distress.   HEENT: Normocephalic and atraumatic. Clear conjunctiva, non icteric. Moist mucosa. Neck supple. RIP, EOMI.  CV: Normal S1 and S2. No murmurs, rubs, or gallops. Peripheral pulses intact and palpable throughout.   RESP: Clear to auscultation bilaterally. No wheezes or rales. No increased work of breathing.   ABD: Soft, nondistended, nontender. No organomegaly. Bowel sounds present.  EXT: Moving upper and lower extremities equally. Right lower extremity presents with circumferential swelling, redness and warmth from level of foot up to mid-calf without clearly demarcated border.   NEURO: Patient awake, but non-verbal and unable to respond to questions. Will attend to voice only.  SKIN: Warm and well perfused, brisk cap refill

## 2021-10-05 NOTE — HISTORY OF PRESENT ILLNESS
[FreeTextEntry1] : right foot cellulitis [de-identified] : has foul smelling right food \par he was at wound care but discharged\par today increasing redness right foot\par peeling skin with abrasion or right torres\par patient with advance dementia

## 2021-10-05 NOTE — ED PROVIDER NOTE - OBJECTIVE STATEMENT
TC is a 61 year old man with PMH significant for CVA and vascular dementia presenting to the hospital from his PCP with cellulitis of his right lower extremity. Patient's family reports that this has been ongoing for a few months. Patient due to medical history is non-verbal and unable to meaningfully respond to questions. Per family patient has had no fevers, and no complaints of pain. Patient lives with mother and is reported to have a home-health aide. TC is a 61 year old man with PMH significant for CVA and vascular dementia presenting to the hospital from his PCP with cellulitis of his right lower extremity. Patient's family reports that this has been ongoing for a few months. Patient due to medical history is non-verbal and unable to meaningfully respond to questions. Per family patient has had no fevers, and no complaints of pain. Patient lives with mother and is reported to have a home-health aide. Patient vaccinated against COVID. TC is a 61 year old man with PMH significant for CVA and vascular dementia presenting to the hospital from his PCP with cellulitis of his right lower extremity. Patient's family reports that this has been ongoing for a few months. Patient due to medical history is non-verbal and unable to meaningfully respond to questions. Per family patient has had no fevers, and no complaints of pain, no changes in appetite, no weight changes. Patient lives with mother and is reported to have a home-health aide. Patient vaccinated against COVID. TC is a 61 year old man with PMH significant for CVA and vascular dementia presenting to the hospital from his PCP with cellulitis of his right lower extremity. Patient's family reports that this has been ongoing for a few months. Patient due to medical history is non-verbal and unable to meaningfully respond to questions. Per family patient has had no fevers, and no complaints of pain, no changes in appetite, no weight changes. Family unaware of any injury to patient's leg. Patient lives with mother and is reported to have a home-health aide. Patient vaccinated against COVID.

## 2021-10-05 NOTE — ASSESSMENT
[FreeTextEntry1] : cellutlits of foot need IV abx it is foul smelling needs blood cultures\par bp stable\par alzheimers disease advanced\par follow up after hospitalzation

## 2021-10-05 NOTE — ED PROVIDER NOTE - ATTENDING CONTRIBUTION TO CARE
I, Conner Padilla, performed a face to face bedside interview with this patient regarding history of present illness, review of symptoms and relevant past medical, social and family history.  I completed an independent physical examination. I have communicated the patient’s plan of care and disposition with the student.  Pt with PMH mentally handicapped, CVA, nonverbal, vascular dementia presents with cellulitis. pt with cellulitis of the R foot x 1 month, constant, failed outpt tx, no fevers, discharge  Gen: NAD, well appearing  CV: RRR  Pul: CTA b/l  Abd: Soft, non-distended, non-tender  Neuro: no focal deficits  msk: circumfeential erythema, edema, tenderness to the RLE for the foot and lower calf  Pt improved, stable for dc

## 2021-10-05 NOTE — H&P ADULT - NSHPPHYSICALEXAM_GEN_ALL_CORE
Vital Signs Last 24 Hrs  T(C): 37.2 (05 Oct 2021 12:07), Max: 37.2 (05 Oct 2021 12:07)  T(F): 98.9 (05 Oct 2021 12:07), Max: 98.9 (05 Oct 2021 12:07)  HR: 87 (05 Oct 2021 15:58) (66 - 87)  BP: 142/84 (05 Oct 2021 15:58) (138/69 - 142/84)  BP(mean): --  RR: 20 (05 Oct 2021 15:58) (18 - 20)  SpO2: 96% (05 Oct 2021 15:58) (96% - 99%)    General: pt. in bed not in distress.  HEENT: AT, NC. PERRL. intact EOM. no throat erythema or exudate.   Neck: supple. no JVD.   Chest: CTA bilaterally  Heart: S1,S2. RRR. no heart murmur. no edema.   Abdomen: soft. non-tender. non-distended. + BS.  Ext: no calf tenderness appreciated B/L, rt. foot dorsal aspect to lower part of rt. calf area erythema and some warmth is noted, no open wound, rt. heel outer aspect with callus formation, grayish pseudomembrane between rt. toes noted, Left foot and lower ext with no sig. findings.  Neuro: Alert, awake, non verbal at baseline, moving extremities, no change in baseline mental status per brother.  Skin: warm and dry, other rt. foot findings as in ext. exam.   psych : no agitation, resting comfortably in bed. Vital Signs Last 24 Hrs  T(C): 37.2 (05 Oct 2021 12:07), Max: 37.2 (05 Oct 2021 12:07)  T(F): 98.9 (05 Oct 2021 12:07), Max: 98.9 (05 Oct 2021 12:07)  HR: 87 (05 Oct 2021 15:58) (66 - 87)  BP: 142/84 (05 Oct 2021 15:58) (138/69 - 142/84)  BP(mean): --  RR: 20 (05 Oct 2021 15:58) (18 - 20)  SpO2: 96% (05 Oct 2021 15:58) (96% - 99%)    General: pt. in bed not in distress.  HEENT: AT, NC. PERRL. intact EOM. no throat erythema or exudate.   Neck: supple. no JVD.   Chest: CTA bilaterally  Heart: S1,S2. RRR. no heart murmur. no edema.   Abdomen: soft. non-tender. non-distended. + BS.  Ext: no calf tenderness appreciated B/L, rt. foot dorsal aspect to lower part of rt. calf area erythema and some warmth is noted, no open wound, rt. heel outer aspect with callus formation, grayish pseudomembrane and skin break between rt. toes noted, Left foot and lower ext with no sig. findings.  Neuro: Alert, awake, non verbal at baseline, moving extremities, no change in baseline mental status per brother.  Skin: warm and dry, other rt. foot findings as in ext. exam.   psych : no agitation, resting comfortably in bed.

## 2021-10-05 NOTE — ED PROVIDER NOTE - CLINICAL SUMMARY MEDICAL DECISION MAKING FREE TEXT BOX
TC is a 61 year old man with PMH significant for CVA and vascular dementia presenting to the hospital from his PCP with cellulitis of his right lower extremity. Patient with area of swelling, warmth and redness on right lower extremity at level of foot up to mid-calf without clearly demarcated border consistent with cellulitis of right lower extremity. Low concern for septic presentation. CBC, CMP, lactate, blood cultures, and right ankle xray ordered. Treating with IV doxycycline given endorsed timeframe of infection.

## 2021-10-05 NOTE — H&P ADULT - HISTORY OF PRESENT ILLNESS
62 y/o male who is non verbal at baseline, with h/o vascular dementia which started 10 years ago and he has been non verbal since then was brought in for RLE cellulitis with failed out-pt therapy, on going for past few weeks, pt's brother not sure how it started, no fever. no other complaints. no cp, no sob, no abd. pain. no n/v/d. As per pt's brother he has no swallowing difficulty and not using any medicines on daily basis.

## 2021-10-06 LAB
ANION GAP SERPL CALC-SCNC: 11 MMOL/L — SIGNIFICANT CHANGE UP (ref 5–17)
APTT BLD: 35.7 SEC — HIGH (ref 27.5–35.5)
BASOPHILS # BLD AUTO: 0.05 K/UL — SIGNIFICANT CHANGE UP (ref 0–0.2)
BASOPHILS NFR BLD AUTO: 0.6 % — SIGNIFICANT CHANGE UP (ref 0–2)
BUN SERPL-MCNC: 12.5 MG/DL — SIGNIFICANT CHANGE UP (ref 8–20)
CALCIUM SERPL-MCNC: 9.4 MG/DL — SIGNIFICANT CHANGE UP (ref 8.6–10.2)
CHLORIDE SERPL-SCNC: 106 MMOL/L — SIGNIFICANT CHANGE UP (ref 98–107)
CO2 SERPL-SCNC: 26 MMOL/L — SIGNIFICANT CHANGE UP (ref 22–29)
COVID-19 SPIKE DOMAIN AB INTERP: POSITIVE
COVID-19 SPIKE DOMAIN ANTIBODY RESULT: 171 U/ML — HIGH
CREAT SERPL-MCNC: 1.02 MG/DL — SIGNIFICANT CHANGE UP (ref 0.5–1.3)
EOSINOPHIL # BLD AUTO: 0.97 K/UL — HIGH (ref 0–0.5)
EOSINOPHIL NFR BLD AUTO: 12.1 % — HIGH (ref 0–6)
GLUCOSE SERPL-MCNC: 76 MG/DL — SIGNIFICANT CHANGE UP (ref 70–99)
HCT VFR BLD CALC: 47.8 % — SIGNIFICANT CHANGE UP (ref 39–50)
HGB BLD-MCNC: 15.9 G/DL — SIGNIFICANT CHANGE UP (ref 13–17)
IMM GRANULOCYTES NFR BLD AUTO: 0.4 % — SIGNIFICANT CHANGE UP (ref 0–1.5)
INR BLD: 1.15 RATIO — SIGNIFICANT CHANGE UP (ref 0.88–1.16)
LYMPHOCYTES # BLD AUTO: 1 K/UL — SIGNIFICANT CHANGE UP (ref 1–3.3)
LYMPHOCYTES # BLD AUTO: 12.5 % — LOW (ref 13–44)
MCHC RBC-ENTMCNC: 30.1 PG — SIGNIFICANT CHANGE UP (ref 27–34)
MCHC RBC-ENTMCNC: 33.3 GM/DL — SIGNIFICANT CHANGE UP (ref 32–36)
MCV RBC AUTO: 90.5 FL — SIGNIFICANT CHANGE UP (ref 80–100)
MONOCYTES # BLD AUTO: 0.52 K/UL — SIGNIFICANT CHANGE UP (ref 0–0.9)
MONOCYTES NFR BLD AUTO: 6.5 % — SIGNIFICANT CHANGE UP (ref 2–14)
NEUTROPHILS # BLD AUTO: 5.44 K/UL — SIGNIFICANT CHANGE UP (ref 1.8–7.4)
NEUTROPHILS NFR BLD AUTO: 67.9 % — SIGNIFICANT CHANGE UP (ref 43–77)
PLATELET # BLD AUTO: 213 K/UL — SIGNIFICANT CHANGE UP (ref 150–400)
POTASSIUM SERPL-MCNC: 4 MMOL/L — SIGNIFICANT CHANGE UP (ref 3.5–5.3)
POTASSIUM SERPL-SCNC: 4 MMOL/L — SIGNIFICANT CHANGE UP (ref 3.5–5.3)
PROTHROM AB SERPL-ACNC: 13.2 SEC — SIGNIFICANT CHANGE UP (ref 10.6–13.6)
RBC # BLD: 5.28 M/UL — SIGNIFICANT CHANGE UP (ref 4.2–5.8)
RBC # FLD: 11.7 % — SIGNIFICANT CHANGE UP (ref 10.3–14.5)
SARS-COV-2 IGG+IGM SERPL QL IA: 171 U/ML — HIGH
SARS-COV-2 IGG+IGM SERPL QL IA: POSITIVE
SARS-COV-2 RNA SPEC QL NAA+PROBE: SIGNIFICANT CHANGE UP
SODIUM SERPL-SCNC: 143 MMOL/L — SIGNIFICANT CHANGE UP (ref 135–145)
WBC # BLD: 8.01 K/UL — SIGNIFICANT CHANGE UP (ref 3.8–10.5)
WBC # FLD AUTO: 8.01 K/UL — SIGNIFICANT CHANGE UP (ref 3.8–10.5)

## 2021-10-06 PROCEDURE — 99222 1ST HOSP IP/OBS MODERATE 55: CPT

## 2021-10-06 RX ORDER — CEFAZOLIN SODIUM 1 G
1000 VIAL (EA) INJECTION EVERY 8 HOURS
Refills: 0 | Status: DISCONTINUED | OUTPATIENT
Start: 2021-10-06 | End: 2021-10-08

## 2021-10-06 RX ADMIN — Medication 1 TABLET(S): at 16:19

## 2021-10-06 RX ADMIN — Medication 1 TABLET(S): at 07:57

## 2021-10-06 RX ADMIN — PIPERACILLIN AND TAZOBACTAM 25 GRAM(S): 4; .5 INJECTION, POWDER, LYOPHILIZED, FOR SOLUTION INTRAVENOUS at 05:52

## 2021-10-06 RX ADMIN — SENNA PLUS 2 TABLET(S): 8.6 TABLET ORAL at 23:00

## 2021-10-06 RX ADMIN — Medication 1 TABLET(S): at 12:31

## 2021-10-06 RX ADMIN — Medication 100 MILLIGRAM(S): at 14:48

## 2021-10-06 RX ADMIN — Medication 1 APPLICATION(S): at 05:56

## 2021-10-06 RX ADMIN — Medication 100 MILLIGRAM(S): at 14:49

## 2021-10-06 RX ADMIN — Medication 100 MILLIGRAM(S): at 22:59

## 2021-10-06 RX ADMIN — ENOXAPARIN SODIUM 40 MILLIGRAM(S): 100 INJECTION SUBCUTANEOUS at 07:57

## 2021-10-06 RX ADMIN — Medication 1 APPLICATION(S): at 16:19

## 2021-10-06 NOTE — CONSULT NOTE ADULT - SUBJECTIVE AND OBJECTIVE BOX
[facility]  INFECTIOUS DISEASES  at [place]  =======================================================  Santi Alfaro MD  Diplomates American Board of Internal Medicine and Infectious Diseases  Tel  [phone]  Fax [phone]  =======================================================    [facility]-[id number]  SHELLY ARMANDO   HPI:  This 60 y/o male with hx of CVA, vascular dementia, nonverbal at baseline,  who was brought in for RLE cellulitis with failed out-pt therapy, on going for past few weeks, pt's brother not sure how it started, no fever. no other complaints. no cp, no sob, no abd. pain. no n/v/d. As per pt's brother he has no swallowing difficulty and not using any medicines on daily basis. (05 Oct 2021 17:37)    patient cannot offer any history  no fevers noted.   patient was started on various antibiotics for the cellulitis.     He has food at his tray table but not eaten anything on his own.     I have personally reviewed the labs and data; pertinent labs and data are listed in this note; please see below.   =======================================================  Past Medical & Surgical Hx:  =====================  PAST MEDICAL & SURGICAL HISTORY:  CVA (cerebral vascular accident) 2013  Nonverbal  Vascular dementia  H/O sinus surgery  S/P orchiectomy    Problem List:  ==========  HEALTH ISSUES - PROBLEM Dx:  Suspected deep vein thrombosis (DVT)         Social Hx:  =======  no toxic habits currently    FAMILY HISTORY:  FH: dementia (Father)    no significant family history of immunosuppressive disorders in mother or father   =======================================================    REVIEW OF SYSTEMS:  Limited due to medical condition      =======================================================  Allergies  Cipro (Hives)    Antibiotics:  ceFAZolin   IVPB 1000 milliGRAM(s) IV Intermittent every 8 hours  clindamycin IVPB 600 milliGRAM(s) IV Intermittent every 8 hours    Other medications:  clotrimazole 1% Cream 1 Application(s) Topical two times a day  enoxaparin Injectable 40 milliGRAM(s) SubCutaneous daily  lactobacillus acidophilus 1 Tablet(s) Oral three times a day with meals  senna 2 Tablet(s) Oral at bedtime     ampicillin/sulbactam  IVPB   200 mL/Hr IV Intermittent (09-15-21 @ 18:18)  doxycycline IVPB  110 mL/Hr IV Intermittent (10-05-21 @ 14:47)  piperacillin/tazobactam IVPB.   200 mL/Hr IV Intermittent (10-05-21 @ 18:34)  piperacillin/tazobactam IVPB..   25 mL/Hr IV Intermittent (10-05-21 @ 23:41)   25 mL/Hr IV Intermittent (10-06-21 @ 05:52)  Vancomycin  IVPB   166.67 mL/Hr IV Intermittent (10-05-21 @ 20:04)      ======================================================  Physical Exam:  ============  T(F): 98.4 (06 Oct 2021 07:49), Max: 98.9 (05 Oct 2021 12:07)  HR: 70 (06 Oct 2021 07:49)  BP: 131/86 (06 Oct 2021 07:49)  RR: 19 (06 Oct 2021 07:49)  SpO2: 97% (06 Oct 2021 07:49) (96% - 99%)  temp max in last 48H T(F): , Max: 98.9 (10-05-21 @ 12:07)Height (cm): 170.2 (10-05-21 @ 12:07)  Weight (kg): 77.1 (10-05-21 @ 12:07)  BMI (kg/m2): 26.6 (10-05-21 @ 12:07)  BSA (m2): 1.89 (10-05-21 @ 12:07)    General:  No acute distress. Awake, non verbal  Eye: Pupils are equal, round and reactive to light, Extraocular movements are intact, Normal conjunctiva.  HENT: Normocephalic, Oral mucosa is moist, No pharyngeal erythema, No sinus tenderness.  Neck: Supple, No lymphadenopathy.  Respiratory: Lungs are clear to auscultation, Respirations are non-labored.  Cardiovascular: Normal rate, Regular rhythm,   Gastrointestinal: Soft, Non-tender, Non-distended, Normal bowel sounds.  Genitourinary: No costovertebral angle tenderness.  Lymphatics: No lymphadenopathy neck,   Musculoskeletal: Normal range of motion, Normal strength.  Integumentary:  RIGHT foot and lower leg with erythema.  DRIED flaking devitalized skin stuck on the lateral, medial aspect of foot, and lower leg.   well demarcated area of redness in the lower part of the leg.  CRACKING between right toes notes at webspace #1.   Neurologic: awake, non verbal     =======================================================  Labs:                        15.9   8.01  )-----------( 213      ( 06 Oct 2021 07:55 )             47.8     WBC Count: 8.01 K/uL (10-06-21 @ 07:55)  WBC Count: 8.18 K/uL (10-05-21 @ 14:20)      10-06    143  |  106  |  12.5  ----------------------------<  76  4.0   |  26.0  |  1.02    Ca    9.4      06 Oct 2021 07:55    TPro  7.6  /  Alb  3.8  /  TBili  0.2<L>  /  DBili  x   /  AST  17  /  ALT  17  /  AlkPhos  105  10-05      Creatinine, Serum: 1.02 mg/dL (10-06-21 @ 07:55)  Creatinine, Serum: 1.03 mg/dL (10-05-21 @ 14:20)         < from: US Duplex Venous Lower Ext Complete, Bilateral (10.05.21 @ 17:52) >     EXAM:  US DPLX LWR EXT VEINS COMPL BI                          PROCEDURE DATE:  10/05/2021          INTERPRETATION:  CLINICAL INFORMATION: Leg swelling      COMPARISON: 9/15/2021.    TECHNIQUE: Duplex sonography of the BILATERAL LOWER extremity veins with color and spectral Doppler, with and without compression.    FINDINGS:    RIGHT:  Normal compressibility of the RIGHT common femoral, femoral and popliteal veins.  Doppler examination shows normal spontaneous and phasic flow.  No RIGHT calf vein thrombosis is detected.    LEFT:  Normal compressibility of the LEFT common femoral, femoral and popliteal veins.  Doppler examination shows normal spontaneous and phasic flow.  No LEFT calf vein thrombosis is detected.    IMPRESSION:  No evidence of deep venous thrombosis in either lower extremity.    --- End of Report ---       WEI REBOLLEDO MD; Attending Radiologist  This document has been electronically signed. Oct  5 2021  6:57PM    < end of copied text >

## 2021-10-06 NOTE — CONSULT NOTE ADULT - ASSESSMENT
This 62 y/o male with hx of CVA, vascular dementia, nonverbal at baseline,  who was brought in for RLE cellulitis with failed out-pt therapy, on going for past few weeks, pt's brother not sure how it started, no fever. no other complaints. no cp, no sob, no abd. pain. no n/v/d. As per pt's brother he has no swallowing difficulty and not using any medicines on daily basis. (05 Oct 2021 17:37)    patient cannot offer any history  no fevers noted.   patient was started on various antibiotics for the cellulitis.       Impression:  right foot and leg cellulitis  dementia  feeding with assistance    Plan:  - unsure if failure of therapy from last ER visit was from non-compliance of medication  - patient requires feeding assistance, which may be once of the factors.     for now:  change antibiotics to:  Clindamycin 600mg IV Q8H x 9 doses (3 days worth)  and Cefazolin 1 gram Q 8H x 7 days --->  this can be eventually changed to Keflex 500mg PO Q6H when ready for discharge.   - he was started on a topical antifungal by medical admission team; can continue this for 1 -2 weeks.       Consider evaluation for his ability to eat and take medication      No further specific infectious disease recommendations. Will be available as needed.

## 2021-10-07 ENCOUNTER — TRANSCRIPTION ENCOUNTER (OUTPATIENT)
Age: 61
End: 2021-10-07

## 2021-10-07 RX ORDER — ENOXAPARIN SODIUM 100 MG/ML
40 INJECTION SUBCUTANEOUS DAILY
Refills: 0 | Status: DISCONTINUED | OUTPATIENT
Start: 2021-10-07 | End: 2021-10-08

## 2021-10-07 RX ADMIN — Medication 100 MILLIGRAM(S): at 21:36

## 2021-10-07 RX ADMIN — Medication 1 TABLET(S): at 17:18

## 2021-10-07 RX ADMIN — Medication 100 MILLIGRAM(S): at 13:31

## 2021-10-07 RX ADMIN — Medication 1 APPLICATION(S): at 17:18

## 2021-10-07 RX ADMIN — Medication 1 APPLICATION(S): at 06:02

## 2021-10-07 RX ADMIN — ENOXAPARIN SODIUM 40 MILLIGRAM(S): 100 INJECTION SUBCUTANEOUS at 10:57

## 2021-10-07 RX ADMIN — Medication 100 MILLIGRAM(S): at 06:02

## 2021-10-07 RX ADMIN — Medication 1 TABLET(S): at 08:47

## 2021-10-07 RX ADMIN — Medication 100 MILLIGRAM(S): at 21:35

## 2021-10-07 RX ADMIN — Medication 100 MILLIGRAM(S): at 13:00

## 2021-10-07 RX ADMIN — Medication 1 TABLET(S): at 10:58

## 2021-10-07 RX ADMIN — SENNA PLUS 2 TABLET(S): 8.6 TABLET ORAL at 21:35

## 2021-10-07 NOTE — PROGRESS NOTE ADULT - ASSESSMENT
62 y/o male who is non verbal at baseline, with h/o vascular dementia which started 10 years ago and he has been non verbal since then was brought in for RLE cellulitis with failed out-pt therapy, on going for past few weeks, pt's brother not sure how it started, no fever. no other complaints. no cp, no sob, no abd. pain. no n/v/d. As per pt's brother he has no swallowing difficulty and not using any medicines on daily basis.    #right foot cellulitis - continues to have erythema, tenderness; without fever  - continue abx as per ID  - elevation of rt. lower ext.   - skin break between rt. toes might have contributed to cellulitis.  - ID recs appreciated  - remains acute - continue with IV antibiotics until improvement    #Tinea pedis, will keep on antifungal.     #H/o vascular dementia, at baseline, enhanced supervision requested.  - requires total assistance for ADL's  - PT to assess for discharge needs when medically ready    #dvt prophylaxis, subcut lovenox 40 mg daily.    
60 y/o male who is non verbal at baseline, with h/o vascular dementia which started 10 years ago and he has been non verbal since then was brought in for RLE cellulitis with failed out-pt therapy, on going for past few weeks, pt's brother not sure how it started, no fever. no other complaints. no cp, no sob, no abd. pain. no n/v/d. As per pt's brother he has no swallowing difficulty and not using any medicines on daily basis.    #right foot cellulitis,   - continue abx as per ID  - elevation of rt. lower ext.   - skin break between rt. toes might have contributed to cellulitis.  - ID recs appreciated    #Tinea pedis, will keep on antifungal.     #H/o vascular dementia, at baseline, enhanced supervision requested.    #dvt prophylaxis, subcut lovenox 40 mg daily.

## 2021-10-07 NOTE — DISCHARGE NOTE PROVIDER - HOSPITAL COURSE
61M with pmhx vascular dementia x10y (nonverbal at baseline) was brought to ED for RLE cellulitis after failing outpatient therapy. LE duplex neg for DVT. Was given a dose of vanc/zosyn. ID saw pt in consult and changed abx to clinda/cefazolin. Also noted to have skin breakdown between toes, ?tinea pedis, which could have contributed to cellulitis. Started on topical antifungal.

## 2021-10-07 NOTE — DISCHARGE NOTE PROVIDER - NSDCCPCAREPLAN_GEN_ALL_CORE_FT
PRINCIPAL DISCHARGE DIAGNOSIS  Diagnosis: Cellulitis of lower leg  Assessment and Plan of Treatment: Please continue antibiotics.       PRINCIPAL DISCHARGE DIAGNOSIS  Diagnosis: Cellulitis of lower leg  Assessment and Plan of Treatment: Please continue antibiotics, both keflex and clindamycin as prescribed. monitor for abdominal discomfort, diarrhea, and fever. return to ED if these symptoms arise. Follow up with PCP on discharge.       PRINCIPAL DISCHARGE DIAGNOSIS  Diagnosis: Cellulitis of lower leg  Assessment and Plan of Treatment: Please continue antibiotics, both keflex and clindamycin as prescribed. monitor for abdominal discomfort, diarrhea, and fever. return to ED if these symptoms arise. Follow up with PCP on discharge.      SECONDARY DISCHARGE DIAGNOSES  Diagnosis: Intertrigo  Assessment and Plan of Treatment: Please apply nystatin cream twice daily and follow up with your primary care provider if rash in groin does not resolve.

## 2021-10-07 NOTE — PROGRESS NOTE ADULT - SUBJECTIVE AND OBJECTIVE BOX
CC: cellulitis (05 Oct 2021 17:37)    HPI:  62 y/o male who is non verbal at baseline, with h/o vascular dementia which started 10 years ago and he has been non verbal since then was brought in for RLE cellulitis with failed out-pt therapy, on going for past few weeks, pt's brother not sure how it started, no fever. no other complaints. no cp, no sob, no abd. pain. no n/v/d. As per pt's brother he has no swallowing difficulty and not using any medicines on daily basis. (05 Oct 2021 17:37)    INTERVAL HPI/OVERNIGHT EVENTS:  unable to offer ros    Vital Signs Last 24 Hrs  T(C): 36.9 (06 Oct 2021 07:49), Max: 37 (05 Oct 2021 19:51)  T(F): 98.4 (06 Oct 2021 07:49), Max: 98.6 (05 Oct 2021 19:51)  HR: 70 (06 Oct 2021 07:49) (70 - 87)  BP: 131/86 (06 Oct 2021 07:49) (131/86 - 142/84)  BP(mean): --  RR: 19 (06 Oct 2021 07:49) (18 - 20)  SpO2: 97% (06 Oct 2021 07:49) (96% - 99%)    PHYSICAL EXAM:  General: Well developed; in no acute distress  Eyes: PERRLA, EOMI; conjunctiva and sclera clear  Head: Normocephalic; atraumatic  ENMT: No nasal discharge; airway clear  Neck: Supple; non tender; no masses  Respiratory: No wheezes, rales or rhonchi  Cardiovascular: Regular rate and rhythm. S1 and S2 Normal; No murmurs, gallops or rubs  Gastrointestinal: Soft non-tender non-distended; Normal bowel sounds  Genitourinary: No costovertebral angle tenderness  Extremities: Normal range of motion, No clubbing, cyanosis or edema; patient with right foot erythema, well demarcated, possible erysipelas; demarcated with marker  Vascular: Peripheral pulses palpable 2+ bilaterally  Neurological: Alert but non-responsive  Skin: Warm and dry. No acute rash  Lymph Nodes: No acute cervical adenopathy  Psychiatric: Cooperative and appropriate    I&O's Detail                        15.9   8.01  )-----------( 213      ( 06 Oct 2021 07:55 )             47.8     06 Oct 2021 07:55    143    |  106    |  12.5   ----------------------------<  76     4.0     |  26.0   |  1.02     Ca    9.4        06 Oct 2021 07:55    TPro  7.6    /  Alb  3.8    /  TBili  0.2    /  DBili  x      /  AST  17     /  ALT  17     /  AlkPhos  105    05 Oct 2021 14:20    PT/INR - ( 06 Oct 2021 07:55 )   PT: 13.2 sec;   INR: 1.15 ratio         PTT - ( 06 Oct 2021 07:55 )  PTT:35.7 sec  CAPILLARY BLOOD GLUCOSE        LIVER FUNCTIONS - ( 05 Oct 2021 14:20 )  Alb: 3.8 g/dL / Pro: 7.6 g/dL / ALK PHOS: 105 U/L / ALT: 17 U/L / AST: 17 U/L / GGT: x               MEDICATIONS  (STANDING):  ceFAZolin   IVPB 1000 milliGRAM(s) IV Intermittent every 8 hours  clindamycin IVPB 600 milliGRAM(s) IV Intermittent every 8 hours  clotrimazole 1% Cream 1 Application(s) Topical two times a day  enoxaparin Injectable 40 milliGRAM(s) SubCutaneous daily  lactobacillus acidophilus 1 Tablet(s) Oral three times a day with meals  senna 2 Tablet(s) Oral at bedtime    MEDICATIONS  (PRN):  lactulose Syrup 10 Gram(s) Oral three times a day PRN constipation      RADIOLOGY & ADDITIONAL TESTS:
CC: cellulitis (05 Oct 2021 17:37)    HPI:  60 y/o male who is non verbal at baseline, with h/o vascular dementia which started 10 years ago and he has been non verbal since then was brought in for RLE cellulitis with failed out-pt therapy, on going for past few weeks, pt's brother not sure how it started, no fever. no other complaints. no cp, no sob, no abd. pain. no n/v/d. As per pt's brother he has no swallowing difficulty and not using any medicines on daily basis. (05 Oct 2021 17:37)    INTERVAL HPI/OVERNIGHT EVENTS:  unable to offer ros    Vital Signs Last 24 Hrs  T(C): 36.4 (07 Oct 2021 11:48), Max: 38.1 (06 Oct 2021 22:13)  T(F): 97.5 (07 Oct 2021 11:48), Max: 100.5 (06 Oct 2021 22:13)  HR: 64 (07 Oct 2021 11:48) (64 - 99)  BP: 124/76 (07 Oct 2021 11:48) (123/81 - 158/104)  BP(mean): --  RR: 18 (07 Oct 2021 11:48) (17 - 18)  SpO2: 96% (07 Oct 2021 11:48) (96% - 99%)    PHYSICAL EXAM:  General: Well developed; in no acute distress  Respiratory: No wheezes, rales or rhonchi  Cardiovascular: Regular rate and rhythm. S1 and S2 Normal; No murmurs, gallops or rubs  Gastrointestinal: Soft non-tender non-distended; Normal bowel sounds  Extremities: Normal range of motion, No clubbing, cyanosis or edema; patient with right foot erythema, well demarcated, possible erysipelas; demarcated with marker; skin additionally flaking  Vascular: Peripheral pulses palpable 2+ bilaterally  Neurological: Alert but non-responsive  Psychiatric: Cooperative and appropriate    I&O's Detail                        15.9   8.01  )-----------( 213      ( 06 Oct 2021 07:55 )             47.8     06 Oct 2021 07:55    143    |  106    |  12.5   ----------------------------<  76     4.0     |  26.0   |  1.02     Ca    9.4        06 Oct 2021 07:55    TPro  7.6    /  Alb  3.8    /  TBili  0.2    /  DBili  x      /  AST  17     /  ALT  17     /  AlkPhos  105    05 Oct 2021 14:20    PT/INR - ( 06 Oct 2021 07:55 )   PT: 13.2 sec;   INR: 1.15 ratio         PTT - ( 06 Oct 2021 07:55 )  PTT:35.7 sec  CAPILLARY BLOOD GLUCOSE        LIVER FUNCTIONS - ( 05 Oct 2021 14:20 )  Alb: 3.8 g/dL / Pro: 7.6 g/dL / ALK PHOS: 105 U/L / ALT: 17 U/L / AST: 17 U/L / GGT: x               MEDICATIONS  (STANDING):  ceFAZolin   IVPB 1000 milliGRAM(s) IV Intermittent every 8 hours  clindamycin IVPB 600 milliGRAM(s) IV Intermittent every 8 hours  clotrimazole 1% Cream 1 Application(s) Topical two times a day  enoxaparin Injectable 40 milliGRAM(s) SubCutaneous daily  lactobacillus acidophilus 1 Tablet(s) Oral three times a day with meals  senna 2 Tablet(s) Oral at bedtime    MEDICATIONS  (PRN):  lactulose Syrup 10 Gram(s) Oral three times a day PRN constipation      RADIOLOGY & ADDITIONAL TESTS:

## 2021-10-07 NOTE — DISCHARGE NOTE PROVIDER - NSDCMRMEDTOKEN_GEN_ALL_CORE_FT
aspirin 81 mg oral tablet, chewable: 1 tab(s) orally once a day  atorvastatin 40 mg oral tablet: 1 tab(s) orally once a day (at bedtime)  Augmentin 875 mg-125 mg oral tablet: 1 tab(s) orally 2 times a day   bisacodyl 10 mg rectal suppository: 1 suppository(ies) rectal once a day  docusate sodium 100 mg oral capsule: 1 cap(s) orally 2 times a day  lactulose 10 g/15 mL oral syrup: 30 milliliter(s) orally 3 times a day, As needed, constipation  polyethylene glycol 3350 oral powder for reconstitution: 17 gram(s) orally once a day  senna oral tablet: 2 tab(s) orally once a day (at bedtime)   aspirin 81 mg oral tablet, chewable: 1 tab(s) orally once a day  atorvastatin 40 mg oral tablet: 1 tab(s) orally once a day (at bedtime)  bisacodyl 10 mg rectal suppository: 1 suppository(ies) rectal once a day  clindamycin 300 mg oral capsule: 2 cap(s) orally 3 times a day   docusate sodium 100 mg oral capsule: 1 cap(s) orally 2 times a day  Florastor 250 mg oral capsule: 1 cap(s) orally 2 times a day   Keflex 500 mg oral capsule: 1 cap(s) orally 4 times a day   lactulose 10 g/15 mL oral syrup: 30 milliliter(s) orally 3 times a day, As needed, constipation  polyethylene glycol 3350 oral powder for reconstitution: 17 gram(s) orally once a day  senna oral tablet: 2 tab(s) orally once a day (at bedtime)   aspirin 81 mg oral tablet, chewable: 1 tab(s) orally once a day  atorvastatin 40 mg oral tablet: 1 tab(s) orally once a day (at bedtime)  bisacodyl 10 mg rectal suppository: 1 suppository(ies) rectal once a day  clindamycin 300 mg oral capsule: 2 cap(s) orally 3 times a day   docusate sodium 100 mg oral capsule: 1 cap(s) orally 2 times a day  Florastor 250 mg oral capsule: 1 cap(s) orally 2 times a day   Keflex 500 mg oral capsule: 1 cap(s) orally 4 times a day   lactulose 10 g/15 mL oral syrup: 30 milliliter(s) orally 3 times a day, As needed, constipation  nystatin 100,000 units/g topical cream: Apply topically to affected area 2 times a day   polyethylene glycol 3350 oral powder for reconstitution: 17 gram(s) orally once a day  senna oral tablet: 2 tab(s) orally once a day (at bedtime)

## 2021-10-08 ENCOUNTER — TRANSCRIPTION ENCOUNTER (OUTPATIENT)
Age: 61
End: 2021-10-08

## 2021-10-08 VITALS
TEMPERATURE: 98 F | RESPIRATION RATE: 17 BRPM | SYSTOLIC BLOOD PRESSURE: 125 MMHG | DIASTOLIC BLOOD PRESSURE: 93 MMHG | HEART RATE: 82 BPM | OXYGEN SATURATION: 98 %

## 2021-10-08 RX ORDER — NYSTATIN CREAM 100000 [USP'U]/G
1 CREAM TOPICAL
Qty: 60 | Refills: 0
Start: 2021-10-08 | End: 2021-10-21

## 2021-10-08 RX ORDER — NYSTATIN CREAM 100000 [USP'U]/G
1 CREAM TOPICAL
Refills: 0 | Status: DISCONTINUED | OUTPATIENT
Start: 2021-10-08 | End: 2021-10-08

## 2021-10-08 RX ORDER — SACCHAROMYCES BOULARDII 250 MG
1 POWDER IN PACKET (EA) ORAL
Qty: 10 | Refills: 0
Start: 2021-10-08 | End: 2021-10-12

## 2021-10-08 RX ORDER — CEPHALEXIN 500 MG
1 CAPSULE ORAL
Qty: 20 | Refills: 0
Start: 2021-10-08 | End: 2021-10-12

## 2021-10-08 RX ADMIN — Medication 1 TABLET(S): at 17:16

## 2021-10-08 RX ADMIN — Medication 1 TABLET(S): at 08:54

## 2021-10-08 RX ADMIN — Medication 100 MILLIGRAM(S): at 06:01

## 2021-10-08 RX ADMIN — Medication 1 TABLET(S): at 11:25

## 2021-10-08 RX ADMIN — Medication 100 MILLIGRAM(S): at 14:13

## 2021-10-08 RX ADMIN — Medication 100 MILLIGRAM(S): at 13:29

## 2021-10-08 RX ADMIN — Medication 1 APPLICATION(S): at 06:01

## 2021-10-08 RX ADMIN — ENOXAPARIN SODIUM 40 MILLIGRAM(S): 100 INJECTION SUBCUTANEOUS at 11:25

## 2021-10-08 RX ADMIN — NYSTATIN CREAM 1 APPLICATION(S): 100000 CREAM TOPICAL at 17:17

## 2021-10-08 RX ADMIN — Medication 1 APPLICATION(S): at 17:18

## 2021-10-08 NOTE — DISCHARGE NOTE NURSING/CASE MANAGEMENT/SOCIAL WORK - PATIENT PORTAL LINK FT
You can access the FollowMyHealth Patient Portal offered by [facility] by registering at the following website: [email or web address]. By joining [facility]’s FollowMyHealth portal, you will also be able to view your health information using other applications (apps) compatible with our system.

## 2021-10-11 ENCOUNTER — NON-APPOINTMENT (OUTPATIENT)
Age: 61
End: 2021-10-11

## 2021-10-19 ENCOUNTER — EMERGENCY (EMERGENCY)
Facility: HOSPITAL | Age: 61
LOS: 1 days | Discharge: HOME | End: 2021-10-19
Attending: EMERGENCY MEDICINE
Payer: MEDICARE

## 2021-10-19 VITALS
RESPIRATION RATE: 18 BRPM | TEMPERATURE: 98 F | OXYGEN SATURATION: 98 % | HEART RATE: 89 BPM | HEIGHT: 67 IN | SYSTOLIC BLOOD PRESSURE: 157 MMHG | DIASTOLIC BLOOD PRESSURE: 87 MMHG | WEIGHT: 175.05 LBS

## 2021-10-19 DIAGNOSIS — Z98.890 OTHER SPECIFIED POSTPROCEDURAL STATES: Chronic | ICD-10-CM

## 2021-10-19 DIAGNOSIS — Z90.79 ACQUIRED ABSENCE OF OTHER GENITAL ORGAN(S): Chronic | ICD-10-CM

## 2021-10-19 PROCEDURE — 72125 CT NECK SPINE W/O DYE: CPT | Mod: 26,MG

## 2021-10-19 PROCEDURE — 12014 RPR F/E/E/N/L/M 5.1-7.5 CM: CPT

## 2021-10-19 PROCEDURE — 99284 EMERGENCY DEPT VISIT MOD MDM: CPT | Mod: 25

## 2021-10-19 PROCEDURE — 90715 TDAP VACCINE 7 YRS/> IM: CPT

## 2021-10-19 PROCEDURE — G1004: CPT

## 2021-10-19 PROCEDURE — 90471 IMMUNIZATION ADMIN: CPT

## 2021-10-19 PROCEDURE — 70450 CT HEAD/BRAIN W/O DYE: CPT | Mod: 26,MG

## 2021-10-19 PROCEDURE — 70486 CT MAXILLOFACIAL W/O DYE: CPT | Mod: MA

## 2021-10-19 PROCEDURE — 70486 CT MAXILLOFACIAL W/O DYE: CPT | Mod: 26,MA

## 2021-10-19 PROCEDURE — 72125 CT NECK SPINE W/O DYE: CPT | Mod: MG

## 2021-10-19 PROCEDURE — 70450 CT HEAD/BRAIN W/O DYE: CPT | Mod: MG

## 2021-10-19 RX ORDER — TETANUS TOXOID, REDUCED DIPHTHERIA TOXOID AND ACELLULAR PERTUSSIS VACCINE, ADSORBED 5; 2.5; 8; 8; 2.5 [IU]/.5ML; [IU]/.5ML; UG/.5ML; UG/.5ML; UG/.5ML
0.5 SUSPENSION INTRAMUSCULAR ONCE
Refills: 0 | Status: COMPLETED | OUTPATIENT
Start: 2021-10-19 | End: 2021-10-19

## 2021-10-19 RX ADMIN — TETANUS TOXOID, REDUCED DIPHTHERIA TOXOID AND ACELLULAR PERTUSSIS VACCINE, ADSORBED 0.5 MILLILITER(S): 5; 2.5; 8; 8; 2.5 SUSPENSION INTRAMUSCULAR at 19:01

## 2021-10-19 NOTE — ED PROVIDER NOTE - PHYSICAL EXAMINATION
General: NAD  Head:  NC, laceration 5 cm over right eyebrow  Eyes: EOMI, PERRLA, no scleral icterus  Ears: no erythema/drainage  Nose: midline, no bleeding/drainage  Throat: MMM  Cardiac: RRR, no m/r/g, no lower extremity edema  Respiratory: CTABL, no wheezes/rales/rhonchi, equal chest wall expansions, no use of accessory muscles, no retractions  Abdomen: soft, nondistended, nontender, no rebound tenderness, no guarding, nonperitonitic  MSK/Vascular: full ROM, soft compartments, warm extremities  Neuro: motor/sensation intact, mentation at baseline  Psych: calm, cooperative, normal affect General: NAD  Head:  NC, laceration 7 cm over left eyebrow  Eyes: EOMI, PERRLA, no scleral icterus  Ears: no erythema/drainage  Nose: midline, no bleeding/drainage  Throat: MMM  Cardiac: RRR, no m/r/g, no lower extremity edema  Respiratory: CTABL, no wheezes/rales/rhonchi, equal chest wall expansions, no use of accessory muscles, no retractions  Abdomen: soft, nondistended, nontender, no rebound tenderness, no guarding, nonperitonitic  MSK/Vascular: full ROM, soft compartments, warm extremities  Neuro: motor/sensation intact, mentation at baseline  Psych: calm, cooperative, normal affect

## 2021-10-19 NOTE — ED PROVIDER NOTE - NSFOLLOWUPINSTRUCTIONS_ED_ALL_ED_FT
Seek a medical provider that can remove stitches in 7 to 10 days.    General info:  Facial Laceration    A facial laceration is a cut on the face. This can happen because of an accident or injury that cuts or tears the skin or tissues on your face. These injuries can hurt and bleed. Some cuts may need to be closed with stitches (sutures), skin glue, or skin tape (adhesive) strips. Cuts usually heal quickly, but they can leave a scar. It can take 1–2 years for the scar to go away completely.    Follow these instructions at home:  Wound care    Follow your doctor's instructions for wound care. These instructions will vary depending on how the wound was closed.  For stitches:  Keep the wound clean and dry.  If you were given a bandage (dressing), change it at least one time a day, or as told by your doctor. Also change the bandage if it gets wet or dirty.  Wash the wound with soap and water two times a day, or as told by your doctor. Rinse off the soap with water. Use a clean towel to pat the wound dry.  After cleaning, apply a thin layer of antibiotic ointment as told by your doctor. This helps prevent infection and keeps the bandage from sticking to the wound.  You may shower as usual after the first 24 hours. Do not soak the wound until the stitches are taken out.  Go back to have your stitches taken out as told by your doctor.  Do not wear makeup until your doctor says it is okay.  For skin tape strips:  Keep the wound clean and dry.  Do not let the skin tape strips get wet.  Bathe carefully to keep the wound and skin tape strips dry. If the wound gets wet, pat it dry with a clean towel right away.  Skin tape strips fall off on their own over time. You may trim the strips as the wound heals. Do not take off skin tape strips that are still stuck to the wound.  For skin glue:  You may briefly wet your wound in the shower or bath.  Do not soak or scrub the wound.  Do not swim.  Do not do anything that makes you sweat a lot until the skin glue has fallen off on its own.  After you shower or take a bath, use a clean towel to gently pat the wound dry.  Do not put liquid medicine, cream medicine, ointment, or makeup on your wound while the skin glue is in place. This may loosen the film before your wound is healed.  If you have a bandage over your wound, be careful not to apply tape directly over the skin glue. This may pull off the skin glue before the wound is healed.  Do not spend a long time in the sun or use a tanning lamp while the skin glue is in place.  The skin glue usually stays in place for 5–10 days. Then, it naturally falls off the skin. Do not pick at the skin glue.    General instructions    Take over-the-counter and prescription medicines only as told by your doctor.  Check your wound area every day for signs of infection. Check for:  More redness, swelling, or pain.  More fluid or blood.  Warmth.  Pus or a bad smell.  If you were prescribed an antibiotic, take or apply it as told by your doctor. Do not stop taking or applying the antibiotic even if your condition improves.  After the cut has healed:   Know that it can take a year or two for redness or scarring to fade.  Apply sunscreen to the skin of your healed wound to minimize scarring. Ultraviolet (UV) rays can darken scar tissue.    Contact a doctor if:  You have a fever.  You have more redness, swelling, or pain around your wound.  You have more fluid or blood coming from your wound.  Your wound feels warm to the touch.  You have pus or a bad smell coming from your wound.    Get help right away if:  You have a red streak going away from your wound.    Summary  A cut on the face (facial laceration) may need to be closed with stitches (sutures), skin tape strips, or skin glue.  Follow your doctor's instructions for wound care.  Check your wound area every day for signs of infection such as redness, swelling, or drainage.    ADDITIONAL NOTES AND INSTRUCTIONS    Please follow up with your Primary MD in 24-48 hr.  Seek immediate medical care for any new/worsening signs or symptoms.

## 2021-10-19 NOTE — ED ADULT NURSE NOTE - INTERVENTIONS DEFINITIONS
[place] to call system/Call bell, personal items and telephone within reach/Instruct patient to call for assistance/Room bathroom lighting operational/Non-slip footwear when patient is off stretcher/Physically safe environment: no spills, clutter or unnecessary equipment/Stretcher in lowest position, wheels locked, appropriate side rails in place

## 2021-10-19 NOTE — ED PROVIDER NOTE - NS ED ROS FT
Constitutional: no fever, no sweats, and no chills.  Eyes: no pain, no redness, and no visual changes.  ENMT: no ear pain and no hearing problems, no nasal congestion/drainage, no dysphagia, and no throat pain, no neck pain, no stiffness  CV: no chest pain, no edema.  Resp: no cough, no dyspnea  GI: no abdominal pain, no bloating, no constipation, no diarrhea, no nausea and no vomiting.  : no dysuria, no hematuria  MSK: no msk pain, no weakness  Skin: +laceration, and no rashes.  Neuro: no LOC, no headache, no sensory deficits, and no weakness.

## 2021-10-19 NOTE — ED ADULT TRIAGE NOTE - CHIEF COMPLAINT QUOTE
witnessed fall from home by home health aid, unknown if syncope unknown LOC. pt non verbal at baseline, eyes open doesn't follow commands, stroke ten years ago. laceration to left eye brow. brought to CT. [facility] at bedside

## 2021-10-19 NOTE — ED PROVIDER NOTE - OBJECTIVE STATEMENT
Pt is a 61 y.o. M hx vascular dementia, nonverbal after past CVA presenting after fall today. The pt was walking when he tripped over the treadmill and fell on to the ground head first, sustaining a laceration over the right eyebrow. Witnessed event by family, not a syncopal event. [facility] imaging ordered. Pt unable to contribute to history due to baseline nonverbal state. Not on anticoagulants. Pt is a 61 y.o. M hx vascular dementia, nonverbal after past CVA presenting after fall today. The pt was walking when he tripped over the treadmill and fell on to the ground head first, sustaining a laceration over the left eyebrow. Witnessed event by family, not a syncopal event. [facility] imaging ordered. Pt unable to contribute to history due to baseline nonverbal state. Not on anticoagulants.

## 2021-10-19 NOTE — ED PROVIDER NOTE - CLINICAL SUMMARY MEDICAL DECISION MAKING FREE TEXT BOX
Pt is a 61 y.o. M hx vascular dementia, nonverbal secondary to CVA, presenting with right facial laceration secondary to mechanical fall from standing height. CT imaging, tetanus shot, laceration repair.

## 2021-10-19 NOTE — ED PROVIDER NOTE - PROGRESS NOTE DETAILS
Melissa Her, Resident: Pt well appearing, vitals accepatable. No active bleeding from laceration which was repaired at bedside. Will discharge, return precautions and red flags verbalized. Advised to remove sutures in 7-10 days.

## 2021-10-19 NOTE — ED PROVIDER NOTE - ATTENDING CONTRIBUTION TO CARE
I personally saw the patient with the resident, and completed the key components of the history and physical exam. I then discussed the management plan with the resident.    62 y/o M with vascular dementia, non-verbal s/p CVA presents for fall today when he tripped over a treadmill and fell to the ground, hitting his head sustaining a laceration over his right eyebrow, no LOC. Not on AC. Ambulates without assistance, acting normally per wife.    AP - NAD, hematoma to top of head with abrasion to top of head, c-shaped laceration over the right eyebrow, EOMI, PERRL, c-collar in place, no palpable spinal step offs, chest wall stable, full ROM bilateral upper and lower extremities, abd soft NT/ND, right LE with improving cellulitis, dry skin, 2+ distal pulses.    Priority CT, update tetanus, lac repair.

## 2021-10-20 ENCOUNTER — APPOINTMENT (OUTPATIENT)
Dept: INTERNAL MEDICINE | Facility: CLINIC | Age: 61
End: 2021-10-20
Payer: MEDICARE

## 2021-10-20 VITALS — RESPIRATION RATE: 12 BRPM | HEART RATE: 78 BPM | DIASTOLIC BLOOD PRESSURE: 78 MMHG | SYSTOLIC BLOOD PRESSURE: 124 MMHG

## 2021-10-20 DIAGNOSIS — S01.81XS LACERATION W/OUT FOREIGN BODY OF OTHER PART OF HEAD, SEQUELA: ICD-10-CM

## 2021-10-20 DIAGNOSIS — L03.119 CELLULITIS OF UNSPECIFIED PART OF LIMB: ICD-10-CM

## 2021-10-20 DIAGNOSIS — L03.011 CELLULITIS OF RIGHT FINGER: ICD-10-CM

## 2021-10-20 PROCEDURE — 99495 TRANSJ CARE MGMT MOD F2F 14D: CPT

## 2021-10-20 RX ORDER — SILVER SULFADIAZINE 10 MG/G
1 CREAM TOPICAL
Qty: 1 | Refills: 3 | Status: ACTIVE | COMMUNITY
Start: 2021-10-20 | End: 1900-01-01

## 2021-10-20 NOTE — HISTORY OF PRESENT ILLNESS
[Admitted on: ___] : The patient was admitted on [unfilled] [Discharged on ___] : discharged on [unfilled] [Discharge Summary] : discharge summary [FreeTextEntry2] : patient discharged following cellulitis, yesterday fell at home has laceration of left brow\par he is non verbal\par his celluitis has improved markedly\par he has no chest pain sob nvd or palpitations\par

## 2021-10-20 NOTE — HEALTH RISK ASSESSMENT
[] : No [Any fall with injury in past year] : Patient reported fall with injury in the past year [Other reason not done] : Other reason not done [de-identified] : non verbal alzheimers [Change in mental status noted] : Change in mental status noted [Language] : difficulty with language [Behavior] : difficulty with behavior [Learning/Retaining New Information] : difficulty learning/retaining new information [Handling Complex Tasks] : difficulty handling complex tasks [Reasoning] : difficulty with reasoning [Spatial Ability and Orientation] : difficulty with spatial ability and orientation [Behavioral] : behavioral [With Family] : lives with family [College] : College [] :  [Sexually Active] : not sexually active [Feels Safe at Home] : Feels safe at home [Reports changes in dental health] : Reports no changes in dental health [Smoke Detector] : smoke detector [Carbon Monoxide Detector] : carbon monoxide detector [Guns at Home] : no guns at home [Safety elements used in home] : safety elements used in home [Seat Belt] :  uses seat belt [Sunscreen] : does not use sunscreen [Travel to Developing Areas] : does not  travel to developing areas [Caregiver Concerns] : does not have caregiver concerns [de-identified] : needs care [de-identified] : needs care

## 2021-10-20 NOTE — PHYSICAL EXAM
[No Acute Distress] : no acute distress [Well Nourished] : well nourished [Well Developed] : well developed [Well-Appearing] : well-appearing [Normal Sclera/Conjunctiva] : normal sclera/conjunctiva [PERRL] : pupils equal round and reactive to light [EOMI] : extraocular movements intact [Normal Outer Ear/Nose] : the outer ears and nose were normal in appearance [Normal Oropharynx] : the oropharynx was normal [No JVD] : no jugular venous distention [No Lymphadenopathy] : no lymphadenopathy [Supple] : supple [Thyroid Normal, No Nodules] : the thyroid was normal and there were no nodules present [No Respiratory Distress] : no respiratory distress  [No Accessory Muscle Use] : no accessory muscle use [Clear to Auscultation] : lungs were clear to auscultation bilaterally [Normal Rate] : normal rate  [Regular Rhythm] : with a regular rhythm [Normal S1, S2] : normal S1 and S2 [No Murmur] : no murmur heard [No Carotid Bruits] : no carotid bruits [No Abdominal Bruit] : a ~M bruit was not heard ~T in the abdomen [No Varicosities] : no varicosities [Pedal Pulses Present] : the pedal pulses are present [No Edema] : there was no peripheral edema [No Palpable Aorta] : no palpable aorta [No Extremity Clubbing/Cyanosis] : no extremity clubbing/cyanosis [Soft] : abdomen soft [Non Tender] : non-tender [Non-distended] : non-distended [No Masses] : no abdominal mass palpated [No HSM] : no HSM [Normal Bowel Sounds] : normal bowel sounds [Normal Posterior Cervical Nodes] : no posterior cervical lymphadenopathy [Normal Anterior Cervical Nodes] : no anterior cervical lymphadenopathy [No CVA Tenderness] : no CVA  tenderness [No Spinal Tenderness] : no spinal tenderness [No Joint Swelling] : no joint swelling [Grossly Normal Strength/Tone] : grossly normal strength/tone [Normal Affect] : the affect was normal [Normal Insight/Judgement] : insight and judgment were intact [de-identified] : peeling skin much improved swelling and cellulitis [de-identified] : needs assistance with walking

## 2021-10-20 NOTE — ASSESSMENT
[FreeTextEntry1] : will need surgery to remove brow laceration sutures deep\par cellulits of foot better\par alzheimers worst\par follow up 3months\par silvadene cram for legs\par avoid picking\par ct of head reviewed

## 2021-10-26 PROCEDURE — 93970 EXTREMITY STUDY: CPT

## 2021-10-26 PROCEDURE — 99285 EMERGENCY DEPT VISIT HI MDM: CPT | Mod: 25

## 2021-10-26 PROCEDURE — 80053 COMPREHEN METABOLIC PANEL: CPT

## 2021-10-26 PROCEDURE — 96366 THER/PROPH/DIAG IV INF ADDON: CPT

## 2021-10-26 PROCEDURE — 96365 THER/PROPH/DIAG IV INF INIT: CPT

## 2021-10-26 PROCEDURE — 83605 ASSAY OF LACTIC ACID: CPT

## 2021-10-26 PROCEDURE — 97163 PT EVAL HIGH COMPLEX 45 MIN: CPT

## 2021-10-26 PROCEDURE — 80048 BASIC METABOLIC PNL TOTAL CA: CPT

## 2021-10-26 PROCEDURE — 73610 X-RAY EXAM OF ANKLE: CPT

## 2021-10-26 PROCEDURE — 85730 THROMBOPLASTIN TIME PARTIAL: CPT

## 2021-10-26 PROCEDURE — 85025 COMPLETE CBC W/AUTO DIFF WBC: CPT

## 2021-10-26 PROCEDURE — 36415 COLL VENOUS BLD VENIPUNCTURE: CPT

## 2021-10-26 PROCEDURE — 86769 SARS-COV-2 COVID-19 ANTIBODY: CPT

## 2021-10-26 PROCEDURE — U0003: CPT

## 2021-10-26 PROCEDURE — U0005: CPT

## 2021-10-26 PROCEDURE — 87040 BLOOD CULTURE FOR BACTERIA: CPT

## 2021-10-26 PROCEDURE — 85610 PROTHROMBIN TIME: CPT

## 2022-01-20 ENCOUNTER — APPOINTMENT (OUTPATIENT)
Dept: INTERNAL MEDICINE | Facility: CLINIC | Age: 62
End: 2022-01-20
Payer: MEDICARE

## 2022-01-20 VITALS — RESPIRATION RATE: 12 BRPM | DIASTOLIC BLOOD PRESSURE: 72 MMHG | SYSTOLIC BLOOD PRESSURE: 132 MMHG | HEART RATE: 72 BPM

## 2022-01-20 VITALS — WEIGHT: 162 LBS | BODY MASS INDEX: 25.37 KG/M2

## 2022-01-20 DIAGNOSIS — L97.401 NON-PRESSURE CHRONIC ULCER OF UNSPECIFIED HEEL AND MIDFOOT LIMITED TO BREAKDOWN OF SKIN: ICD-10-CM

## 2022-01-20 DIAGNOSIS — L97.419 NON-PRESSURE CHRONIC ULCER OF RIGHT HEEL AND MIDFOOT WITH UNSPECIFIED SEVERITY: ICD-10-CM

## 2022-01-20 PROCEDURE — 99214 OFFICE O/P EST MOD 30 MIN: CPT | Mod: 25

## 2022-01-20 PROCEDURE — 36415 COLL VENOUS BLD VENIPUNCTURE: CPT

## 2022-01-20 NOTE — ASSESSMENT
[FreeTextEntry1] : heel  ulceration wound care at home\par dementia pregressively worsening\par bp stable\par lipid check labs\par follow up 4 months\par

## 2022-01-20 NOTE — HEALTH RISK ASSESSMENT
[Never] : Never [No] : No [No falls in past year] : Patient reported no falls in the past year [Medical reason not done] : Medical reason not done [de-identified] : nonverbal

## 2022-01-20 NOTE — PHYSICAL EXAM
[No Acute Distress] : no acute distress [Well Nourished] : well nourished [Well Developed] : well developed [Well-Appearing] : well-appearing [Normal Sclera/Conjunctiva] : normal sclera/conjunctiva [PERRL] : pupils equal round and reactive to light [EOMI] : extraocular movements intact [Normal Outer Ear/Nose] : the outer ears and nose were normal in appearance [Normal Oropharynx] : the oropharynx was normal [No JVD] : no jugular venous distention [No Lymphadenopathy] : no lymphadenopathy [Supple] : supple [Thyroid Normal, No Nodules] : the thyroid was normal and there were no nodules present [No Respiratory Distress] : no respiratory distress  [No Accessory Muscle Use] : no accessory muscle use [Clear to Auscultation] : lungs were clear to auscultation bilaterally [Normal Rate] : normal rate  [Regular Rhythm] : with a regular rhythm [Normal S1, S2] : normal S1 and S2 [No Murmur] : no murmur heard [No Carotid Bruits] : no carotid bruits [No Abdominal Bruit] : a ~M bruit was not heard ~T in the abdomen [No Varicosities] : no varicosities [Pedal Pulses Present] : the pedal pulses are present [No Edema] : there was no peripheral edema [No Palpable Aorta] : no palpable aorta [No Extremity Clubbing/Cyanosis] : no extremity clubbing/cyanosis [Soft] : abdomen soft [Non Tender] : non-tender [Non-distended] : non-distended [No Masses] : no abdominal mass palpated [No HSM] : no HSM [Normal Bowel Sounds] : normal bowel sounds [Normal Posterior Cervical Nodes] : no posterior cervical lymphadenopathy [Normal Anterior Cervical Nodes] : no anterior cervical lymphadenopathy [No CVA Tenderness] : no CVA  tenderness [No Spinal Tenderness] : no spinal tenderness [No Joint Swelling] : no joint swelling [Grossly Normal Strength/Tone] : grossly normal strength/tone [No Rash] : no rash [Normal Affect] : the affect was normal [Normal Insight/Judgement] : insight and judgment were intact [de-identified] : advanced dementia poor gait

## 2022-01-20 NOTE — HISTORY OF PRESENT ILLNESS
[FreeTextEntry1] : follow up  [de-identified] : follow up advanced dementia here with brother non verbal\par uses diapers, has hha at home\par sees wound care at home due to ulcers of his ankles from picking at it\par he has no new symptoms according to his brother\par no symptoms of cough fever or sob, gait is unbalanced\par but patient is unable to follow instructions\par though he remains alert

## 2022-01-21 LAB
25(OH)D3 SERPL-MCNC: 20.9 NG/ML
ALBUMIN SERPL ELPH-MCNC: 4.4 G/DL
ALP BLD-CCNC: 96 U/L
ALT SERPL-CCNC: 17 U/L
ANION GAP SERPL CALC-SCNC: 13 MMOL/L
AST SERPL-CCNC: 13 U/L
BASOPHILS # BLD AUTO: 0.04 K/UL
BASOPHILS NFR BLD AUTO: 0.4 %
BILIRUB SERPL-MCNC: 0.3 MG/DL
BUN SERPL-MCNC: 14 MG/DL
CALCIUM SERPL-MCNC: 9.7 MG/DL
CHLORIDE SERPL-SCNC: 103 MMOL/L
CHOLEST SERPL-MCNC: 173 MG/DL
CO2 SERPL-SCNC: 28 MMOL/L
CREAT SERPL-MCNC: 1.15 MG/DL
EOSINOPHIL # BLD AUTO: 0.25 K/UL
EOSINOPHIL NFR BLD AUTO: 2.4 %
ESTIMATED AVERAGE GLUCOSE: 108 MG/DL
FOLATE SERPL-MCNC: 15.7 NG/ML
GLUCOSE SERPL-MCNC: 91 MG/DL
HBA1C MFR BLD HPLC: 5.4 %
HCT VFR BLD CALC: 47.2 %
HDLC SERPL-MCNC: 45 MG/DL
HGB BLD-MCNC: 15.3 G/DL
IMM GRANULOCYTES NFR BLD AUTO: 0.4 %
LDLC SERPL CALC-MCNC: 110 MG/DL
LYMPHOCYTES # BLD AUTO: 2.02 K/UL
LYMPHOCYTES NFR BLD AUTO: 19.8 %
MAN DIFF?: NORMAL
MCHC RBC-ENTMCNC: 29.7 PG
MCHC RBC-ENTMCNC: 32.4 GM/DL
MCV RBC AUTO: 91.5 FL
MONOCYTES # BLD AUTO: 0.94 K/UL
MONOCYTES NFR BLD AUTO: 9.2 %
NEUTROPHILS # BLD AUTO: 6.93 K/UL
NEUTROPHILS NFR BLD AUTO: 67.8 %
NONHDLC SERPL-MCNC: 128 MG/DL
PLATELET # BLD AUTO: 312 K/UL
POTASSIUM SERPL-SCNC: 4.5 MMOL/L
PROT SERPL-MCNC: 7.8 G/DL
RBC # BLD: 5.16 M/UL
RBC # FLD: 12.2 %
SODIUM SERPL-SCNC: 144 MMOL/L
T4 FREE SERPL-MCNC: 1.4 NG/DL
TRIGL SERPL-MCNC: 90 MG/DL
TSH SERPL-ACNC: 2.94 UIU/ML
VIT B12 SERPL-MCNC: 635 PG/ML
WBC # FLD AUTO: 10.22 K/UL

## 2022-02-08 ENCOUNTER — APPOINTMENT (OUTPATIENT)
Dept: INTERNAL MEDICINE | Facility: CLINIC | Age: 62
End: 2022-02-08

## 2022-02-10 ENCOUNTER — RX RENEWAL (OUTPATIENT)
Age: 62
End: 2022-02-10

## 2022-02-18 ENCOUNTER — NON-APPOINTMENT (OUTPATIENT)
Age: 62
End: 2022-02-18

## 2022-02-22 ENCOUNTER — NON-APPOINTMENT (OUTPATIENT)
Age: 62
End: 2022-02-22

## 2022-03-11 RX ORDER — AMMONIUM LACTATE 12 %
12 CREAM (GRAM) TOPICAL TWICE DAILY
Qty: 1 | Refills: 4 | Status: ACTIVE | COMMUNITY
Start: 2022-02-23 | End: 1900-01-01

## 2022-04-06 ENCOUNTER — APPOINTMENT (OUTPATIENT)
Dept: INTERNAL MEDICINE | Facility: CLINIC | Age: 62
End: 2022-04-06
Payer: MEDICARE

## 2022-04-06 ENCOUNTER — RX RENEWAL (OUTPATIENT)
Age: 62
End: 2022-04-06

## 2022-04-06 VITALS
SYSTOLIC BLOOD PRESSURE: 122 MMHG | BODY MASS INDEX: 25.74 KG/M2 | HEIGHT: 67 IN | WEIGHT: 164 LBS | DIASTOLIC BLOOD PRESSURE: 64 MMHG

## 2022-04-06 DIAGNOSIS — F09 UNSPECIFIED MENTAL DISORDER DUE TO KNOWN PHYSIOLOGICAL CONDITION: ICD-10-CM

## 2022-04-06 DIAGNOSIS — L03.119 CELLULITIS OF UNSPECIFIED PART OF LIMB: ICD-10-CM

## 2022-04-06 PROCEDURE — 99214 OFFICE O/P EST MOD 30 MIN: CPT

## 2022-04-06 NOTE — HEALTH RISK ASSESSMENT
[Never] : Never [No] : No [Any fall with injury in past year] : Patient reported fall with injury in the past year [Medical reason not done] : Medical reason not done [de-identified] : dementia nonvebal

## 2022-04-06 NOTE — HISTORY OF PRESENT ILLNESS
[FreeTextEntry1] : for follow up htn \par dementia\par cellulitis [de-identified] : receiving wound care at home\par cellulitis right leg has healed\par swelling gone\par has been taking meds\par he is non verbal\par got agitated 2 days ago was in ER but is fine now\par brother is asking for boost RX for nutritional support\par he has advance alzheimers

## 2022-04-06 NOTE — PHYSICAL EXAM
[No Acute Distress] : no acute distress [Well Nourished] : well nourished [Well Developed] : well developed [Well-Appearing] : well-appearing [Normal Sclera/Conjunctiva] : normal sclera/conjunctiva [PERRL] : pupils equal round and reactive to light [EOMI] : extraocular movements intact [Normal Outer Ear/Nose] : the outer ears and nose were normal in appearance [Normal Oropharynx] : the oropharynx was normal [No JVD] : no jugular venous distention [No Lymphadenopathy] : no lymphadenopathy [Supple] : supple [Thyroid Normal, No Nodules] : the thyroid was normal and there were no nodules present [No Respiratory Distress] : no respiratory distress  [No Accessory Muscle Use] : no accessory muscle use [Clear to Auscultation] : lungs were clear to auscultation bilaterally [Normal Rate] : normal rate  [Regular Rhythm] : with a regular rhythm [Normal S1, S2] : normal S1 and S2 [No Murmur] : no murmur heard [No Carotid Bruits] : no carotid bruits [No Abdominal Bruit] : a ~M bruit was not heard ~T in the abdomen [No Varicosities] : no varicosities [Pedal Pulses Present] : the pedal pulses are present [No Edema] : there was no peripheral edema [No Palpable Aorta] : no palpable aorta [No Extremity Clubbing/Cyanosis] : no extremity clubbing/cyanosis [Soft] : abdomen soft [Non Tender] : non-tender [Non-distended] : non-distended [No Masses] : no abdominal mass palpated [No HSM] : no HSM [Normal Bowel Sounds] : normal bowel sounds [Normal Posterior Cervical Nodes] : no posterior cervical lymphadenopathy [Normal Anterior Cervical Nodes] : no anterior cervical lymphadenopathy [No CVA Tenderness] : no CVA  tenderness [No Spinal Tenderness] : no spinal tenderness [No Joint Swelling] : no joint swelling [No Rash] : no rash [Grossly Normal Strength/Tone] : grossly normal strength/tone [Coordination Grossly Intact] : coordination grossly intact [No Focal Deficits] : no focal deficits [Normal Gait] : normal gait [Deep Tendon Reflexes (DTR)] : deep tendon reflexes were 2+ and symmetric [Normal Affect] : the affect was normal [Normal Insight/Judgement] : insight and judgment were intact [de-identified] : nonverbal

## 2022-04-06 NOTE — ASSESSMENT
[FreeTextEntry1] : bp stable\par hld to be checked in the future has been stable\par dementia progressive\par cellulitis healing\par continue wound care\par follow up cpe

## 2022-05-11 NOTE — ED PROVIDER NOTE - CRITERIA ADMIT PEDS PT
VARENICLINE  1 mg tablets     Does it cover Nicoderm patch (21 mg), nicotine gum or lozenger 4 mg.     DR. JAZMYNE SYLVESTER  [address], [place] [address] · ~37.3 mi   [phone]    
No

## 2022-05-25 ENCOUNTER — RX RENEWAL (OUTPATIENT)
Age: 62
End: 2022-05-25

## 2022-06-07 ENCOUNTER — INPATIENT (INPATIENT)
Facility: HOSPITAL | Age: 62
LOS: 1 days | Discharge: HOME | DRG: 607 | End: 2022-06-09
Attending: HOSPITALIST | Admitting: STUDENT IN AN ORGANIZED HEALTH CARE EDUCATION/TRAINING PROGRAM
Payer: MEDICARE

## 2022-06-07 VITALS
WEIGHT: 145.73 LBS | RESPIRATION RATE: 16 BRPM | OXYGEN SATURATION: 98 % | SYSTOLIC BLOOD PRESSURE: 111 MMHG | HEIGHT: 67 IN | DIASTOLIC BLOOD PRESSURE: 71 MMHG | TEMPERATURE: 98 F | HEART RATE: 72 BPM

## 2022-06-07 DIAGNOSIS — Z98.890 OTHER SPECIFIED POSTPROCEDURAL STATES: Chronic | ICD-10-CM

## 2022-06-07 DIAGNOSIS — L03.90 CELLULITIS, UNSPECIFIED: ICD-10-CM

## 2022-06-07 DIAGNOSIS — Z90.79 ACQUIRED ABSENCE OF OTHER GENITAL ORGAN(S): Chronic | ICD-10-CM

## 2022-06-07 LAB
ALBUMIN SERPL ELPH-MCNC: 3.7 G/DL — SIGNIFICANT CHANGE UP (ref 3.3–5.2)
ALP SERPL-CCNC: 100 U/L — SIGNIFICANT CHANGE UP (ref 40–120)
ALT FLD-CCNC: 13 U/L — SIGNIFICANT CHANGE UP
ANION GAP SERPL CALC-SCNC: 9 MMOL/L — SIGNIFICANT CHANGE UP (ref 5–17)
AST SERPL-CCNC: 15 U/L — SIGNIFICANT CHANGE UP
BASOPHILS # BLD AUTO: 0.04 K/UL — SIGNIFICANT CHANGE UP (ref 0–0.2)
BASOPHILS NFR BLD AUTO: 0.6 % — SIGNIFICANT CHANGE UP (ref 0–2)
BILIRUB SERPL-MCNC: 0.2 MG/DL — LOW (ref 0.4–2)
BUN SERPL-MCNC: 14.4 MG/DL — SIGNIFICANT CHANGE UP (ref 8–20)
CALCIUM SERPL-MCNC: 9.1 MG/DL — SIGNIFICANT CHANGE UP (ref 8.6–10.2)
CHLORIDE SERPL-SCNC: 104 MMOL/L — SIGNIFICANT CHANGE UP (ref 98–107)
CO2 SERPL-SCNC: 28 MMOL/L — SIGNIFICANT CHANGE UP (ref 22–29)
CREAT SERPL-MCNC: 1.03 MG/DL — SIGNIFICANT CHANGE UP (ref 0.5–1.3)
EGFR: 83 ML/MIN/1.73M2 — SIGNIFICANT CHANGE UP
EOSINOPHIL # BLD AUTO: 0.34 K/UL — SIGNIFICANT CHANGE UP (ref 0–0.5)
EOSINOPHIL NFR BLD AUTO: 4.8 % — SIGNIFICANT CHANGE UP (ref 0–6)
GLUCOSE SERPL-MCNC: 83 MG/DL — SIGNIFICANT CHANGE UP (ref 70–99)
HCT VFR BLD CALC: 42 % — SIGNIFICANT CHANGE UP (ref 39–50)
HGB BLD-MCNC: 13.7 G/DL — SIGNIFICANT CHANGE UP (ref 13–17)
IMM GRANULOCYTES NFR BLD AUTO: 0.4 % — SIGNIFICANT CHANGE UP (ref 0–1.5)
LYMPHOCYTES # BLD AUTO: 1.55 K/UL — SIGNIFICANT CHANGE UP (ref 1–3.3)
LYMPHOCYTES # BLD AUTO: 21.9 % — SIGNIFICANT CHANGE UP (ref 13–44)
MCHC RBC-ENTMCNC: 29.3 PG — SIGNIFICANT CHANGE UP (ref 27–34)
MCHC RBC-ENTMCNC: 32.6 GM/DL — SIGNIFICANT CHANGE UP (ref 32–36)
MCV RBC AUTO: 89.7 FL — SIGNIFICANT CHANGE UP (ref 80–100)
MONOCYTES # BLD AUTO: 0.68 K/UL — SIGNIFICANT CHANGE UP (ref 0–0.9)
MONOCYTES NFR BLD AUTO: 9.6 % — SIGNIFICANT CHANGE UP (ref 2–14)
NEUTROPHILS # BLD AUTO: 4.43 K/UL — SIGNIFICANT CHANGE UP (ref 1.8–7.4)
NEUTROPHILS NFR BLD AUTO: 62.7 % — SIGNIFICANT CHANGE UP (ref 43–77)
PLATELET # BLD AUTO: 250 K/UL — SIGNIFICANT CHANGE UP (ref 150–400)
POTASSIUM SERPL-MCNC: 4.4 MMOL/L — SIGNIFICANT CHANGE UP (ref 3.5–5.3)
POTASSIUM SERPL-SCNC: 4.4 MMOL/L — SIGNIFICANT CHANGE UP (ref 3.5–5.3)
PROT SERPL-MCNC: 7.6 G/DL — SIGNIFICANT CHANGE UP (ref 6.6–8.7)
RBC # BLD: 4.68 M/UL — SIGNIFICANT CHANGE UP (ref 4.2–5.8)
RBC # FLD: 12.3 % — SIGNIFICANT CHANGE UP (ref 10.3–14.5)
SODIUM SERPL-SCNC: 141 MMOL/L — SIGNIFICANT CHANGE UP (ref 135–145)
WBC # BLD: 7.07 K/UL — SIGNIFICANT CHANGE UP (ref 3.8–10.5)
WBC # FLD AUTO: 7.07 K/UL — SIGNIFICANT CHANGE UP (ref 3.8–10.5)

## 2022-06-07 PROCEDURE — 99285 EMERGENCY DEPT VISIT HI MDM: CPT

## 2022-06-07 PROCEDURE — 99223 1ST HOSP IP/OBS HIGH 75: CPT

## 2022-06-07 PROCEDURE — 93010 ELECTROCARDIOGRAM REPORT: CPT

## 2022-06-07 RX ORDER — PIPERACILLIN AND TAZOBACTAM 4; .5 G/20ML; G/20ML
3.38 INJECTION, POWDER, LYOPHILIZED, FOR SOLUTION INTRAVENOUS ONCE
Refills: 0 | Status: COMPLETED | OUTPATIENT
Start: 2022-06-07 | End: 2022-06-07

## 2022-06-07 RX ORDER — ONDANSETRON 8 MG/1
4 TABLET, FILM COATED ORAL EVERY 8 HOURS
Refills: 0 | Status: DISCONTINUED | OUTPATIENT
Start: 2022-06-07 | End: 2022-06-09

## 2022-06-07 RX ORDER — HEPARIN SODIUM 5000 [USP'U]/ML
5000 INJECTION INTRAVENOUS; SUBCUTANEOUS EVERY 8 HOURS
Refills: 0 | Status: DISCONTINUED | OUTPATIENT
Start: 2022-06-07 | End: 2022-06-09

## 2022-06-07 RX ORDER — VANCOMYCIN HCL 1 G
1000 VIAL (EA) INTRAVENOUS EVERY 12 HOURS
Refills: 0 | Status: DISCONTINUED | OUTPATIENT
Start: 2022-06-07 | End: 2022-06-08

## 2022-06-07 RX ORDER — LANOLIN ALCOHOL/MO/W.PET/CERES
3 CREAM (GRAM) TOPICAL AT BEDTIME
Refills: 0 | Status: DISCONTINUED | OUTPATIENT
Start: 2022-06-07 | End: 2022-06-09

## 2022-06-07 RX ORDER — ACETAMINOPHEN 500 MG
650 TABLET ORAL EVERY 6 HOURS
Refills: 0 | Status: DISCONTINUED | OUTPATIENT
Start: 2022-06-07 | End: 2022-06-09

## 2022-06-07 RX ORDER — VANCOMYCIN HCL 1 G
1000 VIAL (EA) INTRAVENOUS ONCE
Refills: 0 | Status: COMPLETED | OUTPATIENT
Start: 2022-06-07 | End: 2022-06-07

## 2022-06-07 RX ORDER — CEFEPIME 1 G/1
1000 INJECTION, POWDER, FOR SOLUTION INTRAMUSCULAR; INTRAVENOUS EVERY 8 HOURS
Refills: 0 | Status: DISCONTINUED | OUTPATIENT
Start: 2022-06-07 | End: 2022-06-08

## 2022-06-07 RX ORDER — CALAMINE AND ZINC OXIDE AND PHENOL 160; 10 MG/ML; MG/ML
1 LOTION TOPICAL THREE TIMES A DAY
Refills: 0 | Status: DISCONTINUED | OUTPATIENT
Start: 2022-06-07 | End: 2022-06-09

## 2022-06-07 RX ORDER — DIPHENHYDRAMINE HCL 50 MG
25 CAPSULE ORAL ONCE
Refills: 0 | Status: COMPLETED | OUTPATIENT
Start: 2022-06-07 | End: 2022-06-07

## 2022-06-07 RX ADMIN — HEPARIN SODIUM 5000 UNIT(S): 5000 INJECTION INTRAVENOUS; SUBCUTANEOUS at 23:01

## 2022-06-07 RX ADMIN — CALAMINE AND ZINC OXIDE AND PHENOL 1 APPLICATION(S): 160; 10 LOTION TOPICAL at 23:01

## 2022-06-07 RX ADMIN — Medication 250 MILLIGRAM(S): at 13:40

## 2022-06-07 RX ADMIN — Medication 25 MILLIGRAM(S): at 20:20

## 2022-06-07 RX ADMIN — PIPERACILLIN AND TAZOBACTAM 200 GRAM(S): 4; .5 INJECTION, POWDER, LYOPHILIZED, FOR SOLUTION INTRAVENOUS at 14:42

## 2022-06-07 RX ADMIN — CEFEPIME 100 MILLIGRAM(S): 1 INJECTION, POWDER, FOR SOLUTION INTRAMUSCULAR; INTRAVENOUS at 23:01

## 2022-06-07 RX ADMIN — Medication 1000 MILLIGRAM(S): at 14:43

## 2022-06-07 RX ADMIN — Medication 250 MILLIGRAM(S): at 21:22

## 2022-06-07 NOTE — H&P ADULT - HISTORY OF PRESENT ILLNESS
63 yo male with hx of vascular dementia and now aphasic was sent in from home for evaluation of RLE cellulitis. Patient is non-verbal and is not able to provide any hx. Attempts made by myself as well as ED TEAM but unable to get in touch with family. Further hx obtained from EMR review. Patient not currently on any medications from prior Visit. Patient was sent in for evaluation of RLE cellulitis - Per EMS- family mentioned that patient has wound to RLE that has started to drain foul-smelling fluid over last few days. Unable to obtain any further hx.

## 2022-06-07 NOTE — ED ADULT TRIAGE NOTE - CHIEF COMPLAINT QUOTE
Pt brought in by ambulance from home for eval of right lower ext wound. Family states that wound has begun to drain excessively and the drainage has a foul odor. Pt with baseline confusion.

## 2022-06-07 NOTE — ED ADULT NURSE NOTE - NSICDXPASTMEDICALHX_GEN_ALL_CORE_FT
PAST MEDICAL HISTORY:  CVA (cerebral vascular accident) 2013    Nonverbal     Vascular dementia     
Walk in

## 2022-06-07 NOTE — PROVIDER CONTACT NOTE (OTHER) - SITUATION
Patient arrived to [facility] from ED. Upon initial assessment patient has full body generalized red rash. Patient uncontrollably scratching. Sitter now at bedside for enchanced safety and care

## 2022-06-07 NOTE — H&P ADULT - ASSESSMENT
61 yo male with hx of vascular dementia and now aphasic was sent in from home for evaluation of RLE cellulitis. Patient is non-verbal and is not able to provide any hx. Attempts made by myself as well as ED TEAM but unable to get in touch with family. Further hx obtained from EMR review. Patient not currently on any medications from prior Visit. Patient was sent in for evaluation of RLE cellulitis - Per EMS- family mentioned that patient has wound to RLE that has started to drain foul-smelling fluid over last few days. Unable to obtain any further hx.      #RLE cellulitis  Patient was given Vanco/Zosyn by ED  Will continue with vanco/cefepime  ID consulted  F/u Blood cx  MRSA swab  US LE ordered     #Vascular dementia  Unsure if patient is able to swallow safely  Will place a swallow consult  SW  Will need to speak with family to see if patient is taking any medications but upon last admission - Patient was not taking any medicines     DVT prophylaxis: heparin    Multiple attempts made to speak to family without any answer.

## 2022-06-07 NOTE — ED PROVIDER NOTE - OBJECTIVE STATEMENT
61yo male with PMH vascular dementia, nonverbal at baseline, presenting with redness, swelling, and foul discharge from RLE. Per EMS- family mentioned that patient has wound to RLE that has started to drain foul-smelling fluid over last few days. patient unable to provide history due to dementia- history obtained via chart. Attempts to contact family using numbers listed in chart were unsuccessful.

## 2022-06-07 NOTE — ED PROVIDER NOTE - PHYSICAL EXAMINATION
please send patient copy of results and letter.    Thanks-- Dr. Mak   Gen: NAD, awake, alert, nonverbal  Head: NCAT  HEENT: oral mucosa moist, normal conjunctiva, oropharynx clear without exudate or erythema  Lung: CTAB, no respiratory distress, no wheezing, rales, rhonchi  CV: normal s1/s2, rrr, no murmurs, Normal perfusion, pulses 2+ throughout  Abd: soft, NTND  MSK: No edema, no visible deformities, full range of motion in all 4 extremities  Neuro: No focal neurologic deficits  Skin: +wound present to R lateral ankle with surround erythema/induration/warmth with dried yellow discharge from wound  Psych: normal affect

## 2022-06-07 NOTE — ED PROVIDER NOTE - CLINICAL SUMMARY MEDICAL DECISION MAKING FREE TEXT BOX
patient with cellulitis of RLE will give antibiotics, US r/o deep venous thrombosis, admission for further management. Deisy Francisco DO

## 2022-06-07 NOTE — PROVIDER CONTACT NOTE (OTHER) - ACTION/TREATMENT ORDERED:
Dina BERGER assessed patient at bedside; pending new orders. Sitter remains in place for enhanced safety precaution.

## 2022-06-08 LAB
ALBUMIN SERPL ELPH-MCNC: 3.3 G/DL — SIGNIFICANT CHANGE UP (ref 3.3–5.2)
ALP SERPL-CCNC: 93 U/L — SIGNIFICANT CHANGE UP (ref 40–120)
ALT FLD-CCNC: 12 U/L — SIGNIFICANT CHANGE UP
ANION GAP SERPL CALC-SCNC: 10 MMOL/L — SIGNIFICANT CHANGE UP (ref 5–17)
AST SERPL-CCNC: 13 U/L — SIGNIFICANT CHANGE UP
BASOPHILS # BLD AUTO: 0.04 K/UL — SIGNIFICANT CHANGE UP (ref 0–0.2)
BASOPHILS NFR BLD AUTO: 0.6 % — SIGNIFICANT CHANGE UP (ref 0–2)
BILIRUB SERPL-MCNC: 0.4 MG/DL — SIGNIFICANT CHANGE UP (ref 0.4–2)
BUN SERPL-MCNC: 11.5 MG/DL — SIGNIFICANT CHANGE UP (ref 8–20)
CALCIUM SERPL-MCNC: 9 MG/DL — SIGNIFICANT CHANGE UP (ref 8.6–10.2)
CHLORIDE SERPL-SCNC: 103 MMOL/L — SIGNIFICANT CHANGE UP (ref 98–107)
CO2 SERPL-SCNC: 24 MMOL/L — SIGNIFICANT CHANGE UP (ref 22–29)
CREAT SERPL-MCNC: 0.82 MG/DL — SIGNIFICANT CHANGE UP (ref 0.5–1.3)
EGFR: 100 ML/MIN/1.73M2 — SIGNIFICANT CHANGE UP
EOSINOPHIL # BLD AUTO: 0.45 K/UL — SIGNIFICANT CHANGE UP (ref 0–0.5)
EOSINOPHIL NFR BLD AUTO: 6.3 % — HIGH (ref 0–6)
GLUCOSE SERPL-MCNC: 75 MG/DL — SIGNIFICANT CHANGE UP (ref 70–99)
HCT VFR BLD CALC: 44.7 % — SIGNIFICANT CHANGE UP (ref 39–50)
HGB BLD-MCNC: 14.2 G/DL — SIGNIFICANT CHANGE UP (ref 13–17)
IMM GRANULOCYTES NFR BLD AUTO: 0.1 % — SIGNIFICANT CHANGE UP (ref 0–1.5)
LYMPHOCYTES # BLD AUTO: 1.49 K/UL — SIGNIFICANT CHANGE UP (ref 1–3.3)
LYMPHOCYTES # BLD AUTO: 20.8 % — SIGNIFICANT CHANGE UP (ref 13–44)
MCHC RBC-ENTMCNC: 28.6 PG — SIGNIFICANT CHANGE UP (ref 27–34)
MCHC RBC-ENTMCNC: 31.8 GM/DL — LOW (ref 32–36)
MCV RBC AUTO: 89.9 FL — SIGNIFICANT CHANGE UP (ref 80–100)
MONOCYTES # BLD AUTO: 0.78 K/UL — SIGNIFICANT CHANGE UP (ref 0–0.9)
MONOCYTES NFR BLD AUTO: 10.9 % — SIGNIFICANT CHANGE UP (ref 2–14)
NEUTROPHILS # BLD AUTO: 4.38 K/UL — SIGNIFICANT CHANGE UP (ref 1.8–7.4)
NEUTROPHILS NFR BLD AUTO: 61.3 % — SIGNIFICANT CHANGE UP (ref 43–77)
PLATELET # BLD AUTO: 175 K/UL — SIGNIFICANT CHANGE UP (ref 150–400)
POTASSIUM SERPL-MCNC: 3.8 MMOL/L — SIGNIFICANT CHANGE UP (ref 3.5–5.3)
POTASSIUM SERPL-SCNC: 3.8 MMOL/L — SIGNIFICANT CHANGE UP (ref 3.5–5.3)
PROT SERPL-MCNC: 7.3 G/DL — SIGNIFICANT CHANGE UP (ref 6.6–8.7)
RBC # BLD: 4.97 M/UL — SIGNIFICANT CHANGE UP (ref 4.2–5.8)
RBC # FLD: 12.1 % — SIGNIFICANT CHANGE UP (ref 10.3–14.5)
SARS-COV-2 RNA SPEC QL NAA+PROBE: SIGNIFICANT CHANGE UP
SODIUM SERPL-SCNC: 137 MMOL/L — SIGNIFICANT CHANGE UP (ref 135–145)
WBC # BLD: 7.15 K/UL — SIGNIFICANT CHANGE UP (ref 3.8–10.5)
WBC # FLD AUTO: 7.15 K/UL — SIGNIFICANT CHANGE UP (ref 3.8–10.5)

## 2022-06-08 PROCEDURE — 99232 SBSQ HOSP IP/OBS MODERATE 35: CPT

## 2022-06-08 PROCEDURE — 99233 SBSQ HOSP IP/OBS HIGH 50: CPT

## 2022-06-08 PROCEDURE — 93971 EXTREMITY STUDY: CPT | Mod: 26,RT

## 2022-06-08 RX ORDER — CEFAZOLIN SODIUM 1 G
2000 VIAL (EA) INJECTION EVERY 8 HOURS
Refills: 0 | Status: DISCONTINUED | OUTPATIENT
Start: 2022-06-08 | End: 2022-06-09

## 2022-06-08 RX ADMIN — Medication 100 MILLIGRAM(S): at 10:47

## 2022-06-08 RX ADMIN — CALAMINE AND ZINC OXIDE AND PHENOL 1 APPLICATION(S): 160; 10 LOTION TOPICAL at 22:27

## 2022-06-08 RX ADMIN — Medication 1 APPLICATION(S): at 22:28

## 2022-06-08 RX ADMIN — Medication 100 MILLIGRAM(S): at 23:12

## 2022-06-08 RX ADMIN — HEPARIN SODIUM 5000 UNIT(S): 5000 INJECTION INTRAVENOUS; SUBCUTANEOUS at 23:13

## 2022-06-08 RX ADMIN — Medication 100 MILLIGRAM(S): at 22:25

## 2022-06-08 RX ADMIN — HEPARIN SODIUM 5000 UNIT(S): 5000 INJECTION INTRAVENOUS; SUBCUTANEOUS at 12:57

## 2022-06-08 RX ADMIN — CEFEPIME 100 MILLIGRAM(S): 1 INJECTION, POWDER, FOR SOLUTION INTRAMUSCULAR; INTRAVENOUS at 06:05

## 2022-06-08 RX ADMIN — HEPARIN SODIUM 5000 UNIT(S): 5000 INJECTION INTRAVENOUS; SUBCUTANEOUS at 06:05

## 2022-06-08 RX ADMIN — CALAMINE AND ZINC OXIDE AND PHENOL 1 APPLICATION(S): 160; 10 LOTION TOPICAL at 10:47

## 2022-06-08 RX ADMIN — Medication 1 APPLICATION(S): at 10:47

## 2022-06-08 RX ADMIN — CALAMINE AND ZINC OXIDE AND PHENOL 1 APPLICATION(S): 160; 10 LOTION TOPICAL at 06:08

## 2022-06-08 NOTE — SWALLOW BEDSIDE ASSESSMENT ADULT - ORAL PREPARATORY PHASE
+impulsivity, attempts to grab cup, easily redirected/Within functional limits Within functional limits mildly reduced oral grading with biting on utensil x1

## 2022-06-08 NOTE — SWALLOW BEDSIDE ASSESSMENT ADULT - SLP PERTINENT HISTORY OF CURRENT PROBLEM
63 yo male with hx of vascular dementia and now aphasic was sent in from home for evaluation of RLE cellulitis. Per Dr. Foy, "	I was able to speak to patients brother Sean at  [phone] and he reports that patients leg has been red for a long time and states that he has not had any follow up for it. He reports that patient does not take any medications but is able to eat a regular diet. Will change patients diet but will still have speech and swallow assess patient."

## 2022-06-08 NOTE — SWALLOW BEDSIDE ASSESSMENT ADULT - SWALLOW EVAL: DIAGNOSIS
Oral and pharyngeal stages of swallow appear grossly functional. Pt requires 1:1 assistance with all PO 2* reduced cognition

## 2022-06-08 NOTE — CONSULT NOTE ADULT - SUBJECTIVE AND OBJECTIVE BOX
INFECTIOUS DISEASES AND INTERNAL MEDICINE at [place]  =======================================================  Saurabh Alfaro MD  Diplomates American Board of Internal Medicine and Infectious Diseases  Telephone [phone]  Fax            [phone]  =======================================================    SHELLY [id number]      HPI:  63 yo male with hx of vascular dementia and now aphasic was sent in from home for evaluation of RLE cellulitis. Patient is non-verbal and is not able to provide any hx. Attempts  BY  ED TEAM but unable to get in touch with family. Further hx obtained from EMR review. Patient not currently on any medications from prior Visit. Patient was sent in for evaluation of RLE cellulitis - Per EMS- family mentioned that patient has wound to RLE that has started to drain foul-smelling fluid over last few days. Unable to obtain any further hx  PT IN NAD IN ER RIGHT LEG WITH EDEMA ERYTHEMA AND SCALINESS NO ODOR OR DRAINAGE NOTED   ASKED TO EVALUATE FROM ID STANDPOINT       PAST MEDICAL & SURGICAL HISTORY:  CVA (cerebral vascular accident)  2013      Nonverbal      Vascular dementia      H/O sinus surgery      S/P orchiectomy          ANTIBIOTICS  cefepime   IVPB 1000 milliGRAM(s) IV Intermittent every 8 hours  vancomycin  IVPB 1000 milliGRAM(s) IV Intermittent every 12 hours      Allergies    Cipro (Hives)    Intolerances        SOCIAL HISTORY:     FAMILY HX   FAMILY HISTORY:  FH: dementia (Father)        Vital Signs Last 24 Hrs  T(C): 36.7 (07 Jun 2022 16:08), Max: 36.7 (07 Jun 2022 12:45)  T(F): 98 (07 Jun 2022 16:08), Max: 98 (07 Jun 2022 12:45)  HR: 76 (07 Jun 2022 16:08) (72 - 76)  BP: 157/91 (07 Jun 2022 16:08) (111/71 - 157/91)  BP(mean): --  RR: 16 (07 Jun 2022 16:08) (16 - 16)  SpO2: 98% (07 Jun 2022 16:08) (98% - 98%)  Drug Dosing Weight  Height (cm): 170.2 (07 Jun 2022 12:45)  Weight (kg): 79.4 (19 Oct 2021 16:50)  BMI (kg/m2): 27.4 (07 Jun 2022 12:45)  BSA (m2): 1.91 (07 Jun 2022 12:45)      REVIEW OF SYSTEMS:  UNABLE TO OBTAIN PT NON VERBAL                 PHYSICAL EXAMINATION:    GENERAL: The patient is a _____in no apparent distress. ___     VITAL SIGNS: T(C): 36.7 (06-07-22 @ 16:08), Max: 36.7 (06-07-22 @ 12:45)  HR: 76 (06-07-22 @ 16:08) (72 - 76)  BP: 157/91 (06-07-22 @ 16:08) (111/71 - 157/91)  RR: 16 (06-07-22 @ 16:08) (16 - 16)  SpO2: 98% (06-07-22 @ 16:08) (98% - 98%)  Wt(kg): --    HEENT: Head is normocephalic and atraumatic.  ANICTERIC  NECK: Supple. No carotid bruits.  No lymphadenopathy or thyromegaly.    LUNGS:COARSE BREATH SOUNDS    HEART: Regular rate and rhythm without murmur.    ABDOMEN: Soft, nontender, and nondistended.  Positive bowel sounds.  No hepatosplenomegaly was noted. NO REBOUND NO GUARDING    EXTREMITIES:RIGHT LEG WITH EDEAM ERYTHEMA WARMTH NO CREPITUS NO ODOR NO DRAINAGE    NEUROLOGIC: NON VERBAL                BLOOD CULTURES       URINE CX          LABS:                        13.7   7.07  )-----------( 250      ( 07 Jun 2022 14:02 )             42.0     06-07    141  |  104  |  14.4  ----------------------------<  83  4.4   |  28.0  |  1.03    Ca    9.1      07 Jun 2022 14:02    TPro  7.6  /  Alb  3.7  /  TBili  0.2<L>  /  DBili  x   /  AST  15  /  ALT  13  /  AlkPhos  100  06-07          RADIOLOGY & ADDITIONAL STUDIES:      ASSESSMENT/PLAN    63 yo male with hx of vascular dementia and now aphasic was sent in from home for evaluation of RLE cellulitis. Patient is non-verbal and is not able to provide any hx. Attempts  BY  ED TEAM but unable to get in touch with family. Further hx obtained from EMR review. Patient not currently on any medications from prior Visit. Patient was sent in for evaluation of RLE cellulitis - Per EMS- family mentioned that patient has wound to RLE that has started to drain foul-smelling fluid over last few days. Unable to obtain any further hx  PT IN NAD IN ER RIGHT LEG WITH EDEMA ERYTHEMA AND SCALINESS NO ODOR OR DRAINAGE NOTED   BLOOD CX X2 SETS WERE SENT  PT PLACED ON IV ABX CEFEPIME VANCO  WILL CONTINUE FOR NOW BUT CAN LIKELY DEESCALATE  SUGGEST MRSA PCR NARES TO SEE IF COLONIZED WITH MRSA  WILL RECOMMEND WARM COMPRESSES AND [id number]  DUPLEX R/O DVT   WILL FOLLOWUP WITH FURTHER RECOMMENDATIONS               SHEREEN CHAPIN MD
    Patient is a 62y old  Male who presents with a chief complaint of RLE Cellulitis (08 Jun 2022 08:46)      HPI:  61 yo male with hx of vascular dementia and now aphasic was sent in from home for evaluation of RLE cellulitis. Patient is non-verbal and is not able to provide any hx. Attempts made by myself as well as ED TEAM but unable to get in touch with family. Further hx obtained from EMR review. Patient not currently on any medications from prior Visit. Patient was sent in for evaluation of RLE cellulitis - Per EMS- family mentioned that patient has wound to RLE that has started to drain foul-smelling fluid over last few days. Unable to obtain any further hx. (07 Jun 2022 16:25)      Podiatry HPI: Patient history as noted above. Patient was nonverbal due to history of vascular dementia. Patient was not in any acute distress. Unable to obtain any history.     PMH:CVA (cerebral vascular accident)    Nonverbal    Vascular dementia      Allergies: Cipro (Hives)    Medications: acetaminophen     Tablet .. 650 milliGRAM(s) Oral every 6 hours PRN  aluminum hydroxide/magnesium hydroxide/simethicone Suspension 30 milliLiter(s) Oral every 4 hours PRN  calamine/zinc oxide Lotion 1 Application(s) Topical three times a day  ceFAZolin   IVPB 2000 milliGRAM(s) IV Intermittent every 8 hours  clindamycin IVPB 600 milliGRAM(s) IV Intermittent every 8 hours  heparin   Injectable 5000 Unit(s) SubCutaneous every 8 hours  melatonin 3 milliGRAM(s) Oral at bedtime PRN  ondansetron Injectable 4 milliGRAM(s) IV Push every 8 hours PRN  triamcinolone 0.1% Ointment 1 Application(s) Topical three times a day    FH:FH: dementia (Father)      PSX: No significant past surgical history    H/O sinus surgery    S/P orchiectomy      SH: Social History:  Unable to obtain social hx (07 Jun 2022 16:25)      Vital Signs Last 24 Hrs  T(C): 36.4 (08 Jun 2022 07:41), Max: 37.4 (08 Jun 2022 05:41)  T(F): 97.5 (08 Jun 2022 07:41), Max: 99.4 (08 Jun 2022 05:41)  HR: 64 (08 Jun 2022 07:41) (64 - 76)  BP: 134/85 (08 Jun 2022 07:41) (111/71 - 157/91)  BP(mean): --  RR: 18 (08 Jun 2022 07:41) (16 - 18)  SpO2: 99% (08 Jun 2022 07:41) (98% - 99%)    LABS                        14.2   7.15  )-----------( 175      ( 08 Jun 2022 04:10 )             44.7               06-08    137  |  103  |  11.5  ----------------------------<  75  3.8   |  24.0  |  0.82    Ca    9.0      08 Jun 2022 04:10    TPro  7.3  /  Alb  3.3  /  TBili  0.4  /  DBili  x   /  AST  13  /  ALT  12  /  AlkPhos  93  06-08     WBC Count: 7.15 K/uL (06-08-22 @ 04:10)  WBC Count: 7.07 K/uL (06-07-22 @ 14:02)        PHYSICAL EXAM    LE Focused:    Vasc:  DP/PT pulses were faintly palpable  Derm: RIght lower leg cellulitis was noted from the pretibial tuberosity to the distal aspect of the foot with no openings, no malodor, no discharge either. Right distal leg and lateral aspect of the foot was noted to have scaling and dryness.   Neuro: unable to assess due to patient's mental status  MSK: unable to assess due to patient's mental status    Imaging:   Xrays ordered        A:   Cellulitis, Right Lower leg  Venous stasis dermatitis, Right lower leg/foot.     P:  Patient evaluated, chart reviewed  Xrays pending  Appreciated ID reccs  Antibiotics per ID  Dressed with xeroform, 4x4 gauze, abd, and kerlix.  Recommend CAIR Boot to prevent any skin breakdown  Recommend right lower leg elevation  Podiatry will follow while in house  Discussed with Attending Dr. Contreras

## 2022-06-08 NOTE — SWALLOW BEDSIDE ASSESSMENT ADULT - COMMENTS
Pt d/w JOSE Coreas, who reports pt consumed 100% of breakfast without overt s/s aspiration with total assistance from staff.

## 2022-06-09 ENCOUNTER — TRANSCRIPTION ENCOUNTER (OUTPATIENT)
Age: 62
End: 2022-06-09

## 2022-06-09 VITALS
DIASTOLIC BLOOD PRESSURE: 83 MMHG | SYSTOLIC BLOOD PRESSURE: 125 MMHG | TEMPERATURE: 98 F | HEART RATE: 80 BPM | RESPIRATION RATE: 18 BRPM | OXYGEN SATURATION: 98 %

## 2022-06-09 LAB
ANION GAP SERPL CALC-SCNC: 12 MMOL/L — SIGNIFICANT CHANGE UP (ref 5–17)
BUN SERPL-MCNC: 13.7 MG/DL — SIGNIFICANT CHANGE UP (ref 8–20)
CALCIUM SERPL-MCNC: 9 MG/DL — SIGNIFICANT CHANGE UP (ref 8.6–10.2)
CHLORIDE SERPL-SCNC: 102 MMOL/L — SIGNIFICANT CHANGE UP (ref 98–107)
CO2 SERPL-SCNC: 24 MMOL/L — SIGNIFICANT CHANGE UP (ref 22–29)
CREAT SERPL-MCNC: 1.04 MG/DL — SIGNIFICANT CHANGE UP (ref 0.5–1.3)
CRP SERPL-MCNC: 14 MG/L — HIGH
EGFR: 82 ML/MIN/1.73M2 — SIGNIFICANT CHANGE UP
GLUCOSE SERPL-MCNC: 96 MG/DL — SIGNIFICANT CHANGE UP (ref 70–99)
HCT VFR BLD CALC: 46.3 % — SIGNIFICANT CHANGE UP (ref 39–50)
HGB BLD-MCNC: 14.8 G/DL — SIGNIFICANT CHANGE UP (ref 13–17)
MCHC RBC-ENTMCNC: 28.8 PG — SIGNIFICANT CHANGE UP (ref 27–34)
MCHC RBC-ENTMCNC: 32 GM/DL — SIGNIFICANT CHANGE UP (ref 32–36)
MCV RBC AUTO: 90.3 FL — SIGNIFICANT CHANGE UP (ref 80–100)
PLATELET # BLD AUTO: 253 K/UL — SIGNIFICANT CHANGE UP (ref 150–400)
POTASSIUM SERPL-MCNC: 4 MMOL/L — SIGNIFICANT CHANGE UP (ref 3.5–5.3)
POTASSIUM SERPL-SCNC: 4 MMOL/L — SIGNIFICANT CHANGE UP (ref 3.5–5.3)
RBC # BLD: 5.13 M/UL — SIGNIFICANT CHANGE UP (ref 4.2–5.8)
RBC # FLD: 12.1 % — SIGNIFICANT CHANGE UP (ref 10.3–14.5)
SODIUM SERPL-SCNC: 138 MMOL/L — SIGNIFICANT CHANGE UP (ref 135–145)
WBC # BLD: 9.17 K/UL — SIGNIFICANT CHANGE UP (ref 3.8–10.5)
WBC # FLD AUTO: 9.17 K/UL — SIGNIFICANT CHANGE UP (ref 3.8–10.5)

## 2022-06-09 PROCEDURE — U0003: CPT

## 2022-06-09 PROCEDURE — 87040 BLOOD CULTURE FOR BACTERIA: CPT

## 2022-06-09 PROCEDURE — 92610 EVALUATE SWALLOWING FUNCTION: CPT

## 2022-06-09 PROCEDURE — 73630 X-RAY EXAM OF FOOT: CPT

## 2022-06-09 PROCEDURE — 36415 COLL VENOUS BLD VENIPUNCTURE: CPT

## 2022-06-09 PROCEDURE — 73590 X-RAY EXAM OF LOWER LEG: CPT

## 2022-06-09 PROCEDURE — 73630 X-RAY EXAM OF FOOT: CPT | Mod: 26,RT

## 2022-06-09 PROCEDURE — 85652 RBC SED RATE AUTOMATED: CPT

## 2022-06-09 PROCEDURE — 85025 COMPLETE CBC W/AUTO DIFF WBC: CPT

## 2022-06-09 PROCEDURE — 96375 TX/PRO/DX INJ NEW DRUG ADDON: CPT

## 2022-06-09 PROCEDURE — 99239 HOSP IP/OBS DSCHRG MGMT >30: CPT

## 2022-06-09 PROCEDURE — 99232 SBSQ HOSP IP/OBS MODERATE 35: CPT

## 2022-06-09 PROCEDURE — U0005: CPT

## 2022-06-09 PROCEDURE — 93971 EXTREMITY STUDY: CPT

## 2022-06-09 PROCEDURE — 99285 EMERGENCY DEPT VISIT HI MDM: CPT

## 2022-06-09 PROCEDURE — 96365 THER/PROPH/DIAG IV INF INIT: CPT

## 2022-06-09 PROCEDURE — 86140 C-REACTIVE PROTEIN: CPT

## 2022-06-09 PROCEDURE — 80053 COMPREHEN METABOLIC PANEL: CPT

## 2022-06-09 PROCEDURE — 85027 COMPLETE CBC AUTOMATED: CPT

## 2022-06-09 PROCEDURE — 93005 ELECTROCARDIOGRAM TRACING: CPT

## 2022-06-09 PROCEDURE — 73590 X-RAY EXAM OF LOWER LEG: CPT | Mod: 26,RT

## 2022-06-09 PROCEDURE — 80048 BASIC METABOLIC PNL TOTAL CA: CPT

## 2022-06-09 RX ORDER — CEPHALEXIN 500 MG
1 CAPSULE ORAL
Qty: 9 | Refills: 0
Start: 2022-06-09 | End: 2022-06-11

## 2022-06-09 RX ORDER — CALAMINE AND ZINC OXIDE AND PHENOL 160; 10 MG/ML; MG/ML
1 LOTION TOPICAL
Qty: 90 | Refills: 0
Start: 2022-06-09 | End: 2022-07-08

## 2022-06-09 RX ADMIN — Medication 100 MILLIGRAM(S): at 06:42

## 2022-06-09 RX ADMIN — HEPARIN SODIUM 5000 UNIT(S): 5000 INJECTION INTRAVENOUS; SUBCUTANEOUS at 06:08

## 2022-06-09 RX ADMIN — CALAMINE AND ZINC OXIDE AND PHENOL 1 APPLICATION(S): 160; 10 LOTION TOPICAL at 06:05

## 2022-06-09 RX ADMIN — Medication 1 APPLICATION(S): at 06:05

## 2022-06-09 RX ADMIN — Medication 1 APPLICATION(S): at 14:53

## 2022-06-09 RX ADMIN — Medication 100 MILLIGRAM(S): at 06:00

## 2022-06-09 RX ADMIN — HEPARIN SODIUM 5000 UNIT(S): 5000 INJECTION INTRAVENOUS; SUBCUTANEOUS at 14:52

## 2022-06-09 RX ADMIN — Medication 100 MILLIGRAM(S): at 14:52

## 2022-06-09 RX ADMIN — CALAMINE AND ZINC OXIDE AND PHENOL 1 APPLICATION(S): 160; 10 LOTION TOPICAL at 14:53

## 2022-06-09 NOTE — DISCHARGE NOTE PROVIDER - NSDCCPCAREPLAN_GEN_ALL_CORE_FT
PRINCIPAL DISCHARGE DIAGNOSIS  Diagnosis: Eczema  Assessment and Plan of Treatment: - take medications as rx   - follow up with pmd and podiatry

## 2022-06-09 NOTE — DISCHARGE NOTE PROVIDER - CARE PROVIDER_API CALL
Primary Care Doctor,   Phone: (   )    -  Fax: (   )    -  Follow Up Time:     Sean Contreras (DPM)  Podiatric Medicine and Surgery  [address] [place] [address]  Phone: [phone]  Fax: [phone]  Follow Up Time:

## 2022-06-09 NOTE — DISCHARGE NOTE PROVIDER - ATTENDING DISCHARGE PHYSICAL EXAMINATION:
PHYSICAL EXAM:  General: NAD, nonverbal  ENT: MMM, no thrush  Neck: Supple, No JVD  Lungs: Clear to auscultation bilaterally, good air entry, non-labored breathing  Cardio: +s1/s2  Abdomen: Soft, Nontender, Nondistended; Bowel sounds present  Extremities: No calf tenderness

## 2022-06-09 NOTE — DISCHARGE NOTE PROVIDER - PROVIDER TOKENS
FREE:[LAST:[Primary Care Doctor],PHONE:[(   )    -],FAX:[(   )    -]],PROVIDER:[TOKEN:[[id number]]]

## 2022-06-09 NOTE — DISCHARGE NOTE NURSING/CASE MANAGEMENT/SOCIAL WORK - NSDCVIVACCINE_GEN_ALL_CORE_FT
Tdap; 19-Oct-2021 19:01; Pita Jiang (RN); [employer]; [id number] (Exp. Date: 19-Sep-2023); IntraMuscular; Deltoid Right.; 0.5 milliLiter(s); VIS (VIS Published: 09-May-2013, VIS Presented: 19-Oct-2021);

## 2022-06-09 NOTE — DISCHARGE NOTE PROVIDER - HOSPITAL COURSE
61 yo male with hx of vascular dementia and now aphasic was sent in from home for evaluation of RLE cellulitis. patient treated with abx while admitted for possible super imposed cellulitis on eczema now being discharged in stable condition

## 2022-06-09 NOTE — PROGRESS NOTE ADULT - ASSESSMENT
61 yo male with hx of vascular dementia and now aphasic was sent in from home for evaluation of RLE cellulitis. Patient is non-verbal and is not able to provide any hx. Attempts  BY  ED TEAM but unable to get in touch with family. Further hx obtained from EMR review. Patient not currently on any medications from prior Visit. Patient was sent in for evaluation of RLE cellulitis - Per EMS- family mentioned that patient has wound to RLE that has started to drain foul-smelling fluid over last few days. Unable to obtain any further hx  PT IN NAD IN ER RIGHT LEG WITH EDEMA ERYTHEMA AND SCALINESS NO ODOR OR DRAINAGE NOTED   BLOOD CX X2 SETS SO FAR NEG    on  CEFAZOLIN/CLINDA   SUGGEST MRSA PCR NARES TO SEE IF COLONIZED WITH MRSA     PT WITH SOME RASH ON ARM BUT DOES NOT APPEAR TO BE DRUG RASH   AREA WITH ERYTHEMA AND   SCALY  MAY BE SOME ELEMENT OF CHRONIC STASIS DERMATITIS   SPOKE TO HOSPITALIST LEG AREA MUCH IMPROVED WITH TOPICAL STEROIDS  PT NON TOXIC FOR D/C TODAY WOULD TREAT WITH A FEW  MORE DAYS OF ORAL KEFLEX  A ABOVE SPOKE TO HOSPITALIST
63 yo male with hx of vascular dementia and now aphasic was sent in from home for evaluation of RLE cellulitis. Patient is non-verbal and is not able to provide any hx. Attempts  BY  ED TEAM but unable to get in touch with family. Further hx obtained from EMR review. Patient not currently on any medications from prior Visit. Patient was sent in for evaluation of RLE cellulitis - Per EMS- family mentioned that patient has wound to RLE that has started to drain foul-smelling fluid over last few days. Unable to obtain any further hx  PT IN NAD IN ER RIGHT LEG WITH EDEMA ERYTHEMA AND SCALINESS NO ODOR OR DRAINAGE NOTED   BLOOD CX X2 SETS SO FAR NEG   AGREE CAN DEESCALATE TO CEFAZOLIN/CLINDA   SUGGEST MRSA PCR NARES TO SEE IF COLONIZED WITH MRSA  WILL RECOMMEND WARM COMPRESSES AND [id number]  PT WITH SOME RASH ON ARM BUT DOES NOT APPEAR TO BE DRUG RASH   AREA WITH ERYTHEMA AND   SCALY  MAY BE SOME ELEMENT OF CHRONIC STASIS DERMATITIS   AGREE WITH STEROIDS  CONSIDER  PODIATRY EVAL OF HEEL AREA  SPOKE TO HOSPITALIST    
63 yo male with hx of vascular dementia and now aphasic was sent in from home for evaluation of RLE cellulitis.     #LE Erythema  - doubt cellulitis or superimposed infection given negative wbc   - probable 2/2 eczema  - monitor cultures  - clinda and ancef-> keflex on d/c  - topical steroids  - podiatry consult appreciated  - ID consult appreciated  - us doppler negative for dvt     #Vascular Dementia  - supportive care    #DVT prophylaxis  - heparin SC    attempted to call patient brother Sean [phone] no answer
63 yo male with hx of vascular dementia and now aphasic was sent in from home for evaluation of RLE cellulitis.     #LE Erythema  - doubt cellulitis or superimposed infection given negative wbc   - probable 2/2 eczema  - monitor cultures  - de-escalate abx to clinda and ancef d/w ID Dr Leggett  - start topical steroids  - podiatry consult   - ID consult appreciated  - us doppler negative for dvt     #Vascular Dementia  - supportive care    #DVT prophylaxis  - heparin SC    attempted to call patient brother Sean [phone] no answer

## 2022-06-09 NOTE — DISCHARGE NOTE NURSING/CASE MANAGEMENT/SOCIAL WORK - NSDCPEFALRISK_GEN_ALL_CORE
For information on Fall & Injury Prevention, visit: [email or web address] OR  [email or web address] OR  https://www.cdc.gov/steadi/patient.html

## 2022-06-09 NOTE — PROGRESS NOTE ADULT - SUBJECTIVE AND OBJECTIVE BOX
Patient is a 62y old  Male who presents with a chief complaint of RLE Cellulitis (08 Jun 2022 08:46)      HPI:  61 yo male with hx of vascular dementia and now aphasic was sent in from home for evaluation of RLE cellulitis. Patient is non-verbal and is not able to provide any hx. Attempts made by myself as well as ED TEAM but unable to get in touch with family. Further hx obtained from EMR review. Patient not currently on any medications from prior Visit. Patient was sent in for evaluation of RLE cellulitis - Per EMS- family mentioned that patient has wound to RLE that has started to drain foul-smelling fluid over last few days. Unable to obtain any further hx. (07 Jun 2022 16:25)      Podiatry HPI: Patient history as noted above. Patient was nonverbal due to history of vascular dementia. Patient was not in any acute distress. Unable to obtain any history.     PMH:CVA (cerebral vascular accident)    Nonverbal    Vascular dementia      Allergies: Cipro (Hives)    Medications acetaminophen     Tablet .. 650 milliGRAM(s) Oral every 6 hours PRN  aluminum hydroxide/magnesium hydroxide/simethicone Suspension 30 milliLiter(s) Oral every 4 hours PRN  calamine/zinc oxide Lotion 1 Application(s) Topical three times a day  ceFAZolin   IVPB 2000 milliGRAM(s) IV Intermittent every 8 hours  clindamycin IVPB 600 milliGRAM(s) IV Intermittent every 8 hours  heparin   Injectable 5000 Unit(s) SubCutaneous every 8 hours  melatonin 3 milliGRAM(s) Oral at bedtime PRN  ondansetron Injectable 4 milliGRAM(s) IV Push every 8 hours PRN  triamcinolone 0.1% Ointment 1 Application(s) Topical three times a day    FHFH: dementia (Father)    ,   PMHCVA (cerebral vascular accident)    Nonverbal    Vascular dementia       PSHNo significant past surgical history    H/O sinus surgery    S/P orchiectomy        Labs                          14.8   9.17  )-----------( 253      ( 09 Jun 2022 03:28 )             46.3      06-09    138  |  102  |  13.7  ----------------------------<  96  4.0   |  24.0  |  1.04    Ca    9.0      09 Jun 2022 03:28    TPro  7.3  /  Alb  3.3  /  TBili  0.4  /  DBili  x   /  AST  13  /  ALT  12  /  AlkPhos  93  06-08     Vital Signs Last 24 Hrs  T(C): 36.7 (09 Jun 2022 07:39), Max: 37.1 (09 Jun 2022 04:45)  T(F): 98.1 (09 Jun 2022 07:39), Max: 98.7 (09 Jun 2022 04:45)  HR: 73 (09 Jun 2022 07:39) (67 - 82)  BP: 119/67 (09 Jun 2022 07:39) (119/67 - 137/91)  BP(mean): --  RR: 18 (09 Jun 2022 07:39) (18 - 19)  SpO2: 100% (09 Jun 2022 07:39) (97% - 100%)         WBC Count: 9.17 K/uL (06-09-22 @ 03:28)  WBC Count: 7.15 K/uL (06-08-22 @ 04:10)  WBC Count: 7.07 K/uL (06-07-22 @ 14:02)    PHYSICAL EXAM    LE Focused:    Vasc:  DP/PT pulses were faintly palpable  Derm: RIght lower leg cellulitis was noted from the pretibial tuberosity to the distal aspect of the foot with no openings, no malodor, no discharge either. Right distal leg and lateral aspect of the foot was noted to have scaling and dryness.   Neuro: unable to assess due to patient's mental status  MSK: unable to assess due to patient's mental status    Imaging:   Xrays final read pending      A:   Cellulitis, Right Lower leg  Venous stasis dermatitis, Right lower leg/foot.     P:  Patient evaluated, chart reviewed  Xrays final read pending  Appreciated ID reccs  Antibiotics per ID  Dressed with xeroform, 4x4 gauze, abd, and kerlix.  Recommend CAIR Boot to prevent any skin breakdown  Recommend right lower leg elevation  Podiatry will follow while in house  Discussed with Attending Dr. Contreras and seen bedside with Dr. Dennis  
Patient is a 62y old  Male who presents with a chief complaint of RLE Cellulitis (09 Jun 2022 09:57)    Patient seen and examined at bedside.     ALLERGIES:  Cipro (Hives)    MEDICATIONS  (STANDING):  calamine/zinc oxide Lotion 1 Application(s) Topical three times a day  ceFAZolin   IVPB 2000 milliGRAM(s) IV Intermittent every 8 hours  clindamycin IVPB 600 milliGRAM(s) IV Intermittent every 8 hours  heparin   Injectable 5000 Unit(s) SubCutaneous every 8 hours  triamcinolone 0.1% Ointment 1 Application(s) Topical three times a day    MEDICATIONS  (PRN):  acetaminophen     Tablet .. 650 milliGRAM(s) Oral every 6 hours PRN Temp greater or equal to 38C (100.4F), Mild Pain (1 - 3)  aluminum hydroxide/magnesium hydroxide/simethicone Suspension 30 milliLiter(s) Oral every 4 hours PRN Dyspepsia  melatonin 3 milliGRAM(s) Oral at bedtime PRN Insomnia  ondansetron Injectable 4 milliGRAM(s) IV Push every 8 hours PRN Nausea and/or Vomiting    Vital Signs Last 24 Hrs  T(F): 98.1 (09 Jun 2022 07:39), Max: 98.7 (09 Jun 2022 04:45)  HR: 73 (09 Jun 2022 07:39) (67 - 82)  BP: 119/67 (09 Jun 2022 07:39) (119/67 - 137/91)  RR: 18 (09 Jun 2022 07:39) (18 - 19)  SpO2: 100% (09 Jun 2022 07:39) (97% - 100%)  I&O's Summary    PHYSICAL EXAM:  General: NAD, nonverbal  ENT: MMM, no thrush  Neck: Supple, No JVD  Lungs: Clear to auscultation bilaterally, good air entry, non-labored breathing  Cardio: +s1/s2  Abdomen: Soft, Nontender, Nondistended; Bowel sounds present  Extremities: No calf tenderness      LABS:                        14.8   9.17  )-----------( 253      ( 09 Jun 2022 03:28 )             46.3     06-09    138  |  102  |  13.7  ----------------------------<  96  4.0   |  24.0  |  1.04    Ca    9.0      09 Jun 2022 03:28    TPro  7.3  /  Alb  3.3  /  TBili  0.4  /  DBili  x   /  AST  13  /  ALT  12  /  AlkPhos  93  06-08    Cultures  Culture - Blood (collected 07 Jun 2022 22:49)  Source: .Blood Blood-Venous  Preliminary Report (08 Jun 2022 23:01):    No growth to date.    Culture - Blood (collected 07 Jun 2022 22:49)  Source: .Blood Blood-Peripheral  Preliminary Report (08 Jun 2022 23:01):    No growth to date.    COVID-19 PCR: NotDetec (06-08-22 @ 06:17)    RADIOLOGY & ADDITIONAL TESTS:  - no new tests    Care Discussed with Consultants/Other Providers:   ID
INFECTIOUS DISEASES AND INTERNAL MEDICINE at [place]  =======================================================  Saurabh Alfaro MD  Diplomates American Board of Internal Medicine and Infectious Diseases  Telephone [phone]  Fax            [phone]  =======================================================    SHELLY ARMANDO [id number]    Follow up: right foot  dermatitis/cellultis    Allergies:  Cipro (Hives)      Medications:  acetaminophen     Tablet .. 650 milliGRAM(s) Oral every 6 hours PRN  aluminum hydroxide/magnesium hydroxide/simethicone Suspension 30 milliLiter(s) Oral every 4 hours PRN  calamine/zinc oxide Lotion 1 Application(s) Topical three times a day  ceFAZolin   IVPB 2000 milliGRAM(s) IV Intermittent every 8 hours  clindamycin IVPB 600 milliGRAM(s) IV Intermittent every 8 hours  heparin   Injectable 5000 Unit(s) SubCutaneous every 8 hours  melatonin 3 milliGRAM(s) Oral at bedtime PRN  ondansetron Injectable 4 milliGRAM(s) IV Push every 8 hours PRN  triamcinolone 0.1% Ointment 1 Application(s) Topical three times a day    SOCIAL       FAMILY   FAMILY HISTORY:  FH: dementia (Father)      REVIEW OF SYSTEMS:   NON VERBAL UNABLE TO OBTAIN          Physical Exam:    Vital Signs Last 24 Hrs  T(C): 36.7 (09 Jun 2022 16:11), Max: 37.1 (09 Jun 2022 04:45)  T(F): 98 (09 Jun 2022 16:11), Max: 98.7 (09 Jun 2022 04:45)  HR: 86 (09 Jun 2022 16:11) (67 - 86)  BP: 134/83 (09 Jun 2022 16:11) (119/67 - 145/80)  BP(mean): --  RR: 18 (09 Jun 2022 16:11) (18 - 18)  SpO2: 96% (09 Jun 2022 16:11) (96% - 100%)    GEN: NAD,   HEENT: normocephalic and atraumatic. EOMI. GENIA.    NECK: Supple. No carotid bruits.  No lymphadenopathy or thyromegaly.  LUNGS: Clear to auscultation.  HEART: Regular rate and rhythm without murmur.  ABDOMEN: Soft, nontender, and nondistended.  Positive bowel sounds.    : No CVA tenderness  EXTREMITIES: Without any cyanosis, clubbing, rash, lesions or edema.  MSK: no joint swelling  NEUROLOGIC: Cranial nerves II through XII are grossly intact.  PSYCHIATRIC: Appropriate affect .  SKIN:  RIGHT LOWER  EXT  DRESSES WITH ACE BANDAGE         Labs:  Vitals:  =     =======================================================  Current Antibiotics:  ceFAZolin   IVPB 2000 milliGRAM(s) IV Intermittent every 8 hours  clindamycin IVPB 600 milliGRAM(s) IV Intermittent every 8 hours    Other medications:  calamine/zinc oxide Lotion 1 Application(s) Topical three times a day  heparin   Injectable 5000 Unit(s) SubCutaneous every 8 hours  triamcinolone 0.1% Ointment 1 Application(s) Topical three times a day      =======================================================  Labs:                                       14.8   9.17  )-----------( 253      ( 09 Jun 2022 03:28 )             46.3   06-09    138  |  102  |  13.7  ----------------------------<  96  4.0   |  24.0  |  1.04    Ca    9.0      09 Jun 2022 03:28    TPro  7.3  /  Alb  3.3  /  TBili  0.4  /  DBili  x   /  AST  13  /  ALT  12  /  AlkPhos  93  06-08        WBC Count: 7.15 K/uL (06-08-22 @ 04:10)  WBC Count: 7.07 K/uL (06-07-22 @ 14:02)        Alkaline Phosphatase, Serum: 93 U/L (06-08-22 @ 04:10)  Alkaline Phosphatase, Serum: 100 U/L (06-07-22 @ 14:02)  Alanine Aminotransferase (ALT/SGPT): 12 U/L (06-08-22 @ 04:10)  Alanine Aminotransferase (ALT/SGPT): 13 U/L (06-07-22 @ 14:02)  Aspartate Aminotransferase (AST/SGOT): 13 U/L (06-08-22 @ 04:10)  Aspartate Aminotransferase (AST/SGOT): 15 U/L (06-07-22 @ 14:02)  Bilirubin Total, Serum: 0.4 mg/dL (06-08-22 @ 04:10)  Bilirubin Total, Serum: 0.2 mg/dL (06-07-22 @ 14:02)  
Patient is a 62y old  Male who presents with a chief complaint of RLE Cellulitis (07 Jun 2022 16:42)    Patient seen and examined at bedside.     ALLERGIES:  Cipro (Hives)    MEDICATIONS  (STANDING):  calamine/zinc oxide Lotion 1 Application(s) Topical three times a day  ceFAZolin   IVPB 2000 milliGRAM(s) IV Intermittent every 8 hours  clindamycin IVPB 600 milliGRAM(s) IV Intermittent every 8 hours  heparin   Injectable 5000 Unit(s) SubCutaneous every 8 hours  triamcinolone 0.1% Ointment 1 Application(s) Topical three times a day    MEDICATIONS  (PRN):  acetaminophen     Tablet .. 650 milliGRAM(s) Oral every 6 hours PRN Temp greater or equal to 38C (100.4F), Mild Pain (1 - 3)  aluminum hydroxide/magnesium hydroxide/simethicone Suspension 30 milliLiter(s) Oral every 4 hours PRN Dyspepsia  melatonin 3 milliGRAM(s) Oral at bedtime PRN Insomnia  ondansetron Injectable 4 milliGRAM(s) IV Push every 8 hours PRN Nausea and/or Vomiting    Vital Signs Last 24 Hrs  T(F): 97.5 (08 Jun 2022 07:41), Max: 99.4 (08 Jun 2022 05:41)  HR: 64 (08 Jun 2022 07:41) (64 - 76)  BP: 134/85 (08 Jun 2022 07:41) (111/71 - 157/91)  RR: 18 (08 Jun 2022 07:41) (16 - 18)  SpO2: 99% (08 Jun 2022 07:41) (98% - 99%)  I&O's Summary    07 Jun 2022 07:01  -  08 Jun 2022 07:00  --------------------------------------------------------  IN: 0 mL / OUT: 1500 mL / NET: -1500 mL      PHYSICAL EXAM:  General: NAD, nonverbal  ENT: MMM, no thrush  Neck: Supple, No JVD  Lungs: Clear to auscultation bilaterally, good air entry, non-labored breathing  Cardio: +s1/s2  Abdomen: Soft, Nontender, Nondistended; Bowel sounds present  Extremities: No calf tenderness, +erythema w/ scaling in lower extremity    LABS:                        14.2   7.15  )-----------( 175      ( 08 Jun 2022 04:10 )             44.7     06-08    137  |  103  |  11.5  ----------------------------<  75  3.8   |  24.0  |  0.82    Ca    9.0      08 Jun 2022 04:10    TPro  7.3  /  Alb  3.3  /  TBili  0.4  /  DBili  x   /  AST  13  /  ALT  12  /  AlkPhos  93  06-08    RADIOLOGY & ADDITIONAL TESTS:  - no new tests    Care Discussed with Consultants/Other Providers:   ID
INFECTIOUS DISEASES AND INTERNAL MEDICINE at [place]  =======================================================  Saurabh Alfaro MD  Diplomates American Board of Internal Medicine and Infectious Diseases  Telephone [phone]  Fax            [phone]  =======================================================    SHELLY ARMANDO [id number]    Follow up:    Allergies:  Cipro (Hives)      Medications:  acetaminophen     Tablet .. 650 milliGRAM(s) Oral every 6 hours PRN  aluminum hydroxide/magnesium hydroxide/simethicone Suspension 30 milliLiter(s) Oral every 4 hours PRN  calamine/zinc oxide Lotion 1 Application(s) Topical three times a day  ceFAZolin   IVPB 2000 milliGRAM(s) IV Intermittent every 8 hours  clindamycin IVPB 600 milliGRAM(s) IV Intermittent every 8 hours  heparin   Injectable 5000 Unit(s) SubCutaneous every 8 hours  melatonin 3 milliGRAM(s) Oral at bedtime PRN  ondansetron Injectable 4 milliGRAM(s) IV Push every 8 hours PRN  triamcinolone 0.1% Ointment 1 Application(s) Topical three times a day    SOCIAL       FAMILY   FAMILY HISTORY:  FH: dementia (Father)      REVIEW OF SYSTEMS:   NON VERBAL UNABLE TO OBTAIN          Physical Exam:  ICU Vital Signs Last 24 Hrs  T(C): 36.4 (08 Jun 2022 07:41), Max: 37.4 (08 Jun 2022 05:41)  T(F): 97.5 (08 Jun 2022 07:41), Max: 99.4 (08 Jun 2022 05:41)  HR: 64 (08 Jun 2022 07:41) (64 - 76)  BP: 134/85 (08 Jun 2022 07:41) (111/71 - 157/91)  BP(mean): --  ABP: --  ABP(mean): --  RR: 18 (08 Jun 2022 07:41) (16 - 18)  SpO2: 99% (08 Jun 2022 07:41) (98% - 99%)    GEN: NAD,   HEENT: normocephalic and atraumatic. EOMI. GENIA.    NECK: Supple. No carotid bruits.  No lymphadenopathy or thyromegaly.  LUNGS: Clear to auscultation.  HEART: Regular rate and rhythm without murmur.  ABDOMEN: Soft, nontender, and nondistended.  Positive bowel sounds.    : No CVA tenderness  EXTREMITIES: Without any cyanosis, clubbing, rash, lesions or edema.  MSK: no joint swelling  NEUROLOGIC: Cranial nerves II through XII are grossly intact.  PSYCHIATRIC: Appropriate affect .  SKIN: SCALY ERYTHEMA AND EDEMA OF RIGHT LOWER  EXT         Labs:  Vitals:  ============  T(F): 97.5 (08 Jun 2022 07:41), Max: 99.4 (08 Jun 2022 05:41)  HR: 64 (08 Jun 2022 07:41)  BP: 134/85 (08 Jun 2022 07:41)  RR: 18 (08 Jun 2022 07:41)  SpO2: 99% (08 Jun 2022 07:41) (98% - 99%)  temp max in last 48H T(F): , Max: 99.4 (06-08-22 @ 05:41)    =======================================================  Current Antibiotics:  ceFAZolin   IVPB 2000 milliGRAM(s) IV Intermittent every 8 hours  clindamycin IVPB 600 milliGRAM(s) IV Intermittent every 8 hours    Other medications:  calamine/zinc oxide Lotion 1 Application(s) Topical three times a day  heparin   Injectable 5000 Unit(s) SubCutaneous every 8 hours  triamcinolone 0.1% Ointment 1 Application(s) Topical three times a day      =======================================================  Labs:                        14.2   7.15  )-----------( 175      ( 08 Jun 2022 04:10 )             44.7     06-08    137  |  103  |  11.5  ----------------------------<  75  3.8   |  24.0  |  0.82    Ca    9.0      08 Jun 2022 04:10    TPro  7.3  /  Alb  3.3  /  TBili  0.4  /  DBili  x   /  AST  13  /  ALT  12  /  AlkPhos  93  06-08      Creatinine, Serum: 0.82 mg/dL (06-08-22 @ 04:10)  Creatinine, Serum: 1.03 mg/dL (06-07-22 @ 14:02)            WBC Count: 7.15 K/uL (06-08-22 @ 04:10)  WBC Count: 7.07 K/uL (06-07-22 @ 14:02)        Alkaline Phosphatase, Serum: 93 U/L (06-08-22 @ 04:10)  Alkaline Phosphatase, Serum: 100 U/L (06-07-22 @ 14:02)  Alanine Aminotransferase (ALT/SGPT): 12 U/L (06-08-22 @ 04:10)  Alanine Aminotransferase (ALT/SGPT): 13 U/L (06-07-22 @ 14:02)  Aspartate Aminotransferase (AST/SGOT): 13 U/L (06-08-22 @ 04:10)  Aspartate Aminotransferase (AST/SGOT): 15 U/L (06-07-22 @ 14:02)  Bilirubin Total, Serum: 0.4 mg/dL (06-08-22 @ 04:10)  Bilirubin Total, Serum: 0.2 mg/dL (06-07-22 @ 14:02)

## 2022-06-09 NOTE — DISCHARGE NOTE NURSING/CASE MANAGEMENT/SOCIAL WORK - PATIENT PORTAL LINK FT
You can access the FollowMyHealth Patient Portal offered by [facility] by registering at the following website: [email or web address]. By joining [facility]’s FollowMyHealth portal, you will also be able to view your health information using other applications (apps) compatible with our system.

## 2022-06-09 NOTE — DISCHARGE NOTE PROVIDER - NSDCMRMEDTOKEN_GEN_ALL_CORE_FT
calamine topical lotion: 1 application topically 3 times a day  cephalexin 500 mg oral capsule: 1 cap(s) orally 3 times a day   triamcinolone 0.1% topical ointment: 1 application topically 3 times a day

## 2022-06-09 NOTE — DISCHARGE NOTE PROVIDER - NSDCFUSCHEDAPPT_GEN_ALL_CORE_FT
Presley Billingsley  [facility]  IntMed [address]  Scheduled Appointment: 07/22/2022

## 2022-07-22 ENCOUNTER — APPOINTMENT (OUTPATIENT)
Dept: INTERNAL MEDICINE | Facility: CLINIC | Age: 62
End: 2022-07-22

## 2022-07-22 VITALS — SYSTOLIC BLOOD PRESSURE: 118 MMHG | DIASTOLIC BLOOD PRESSURE: 78 MMHG | HEART RATE: 78 BPM | RESPIRATION RATE: 16 BRPM

## 2022-07-22 PROCEDURE — G0439: CPT

## 2022-07-22 RX ORDER — CEPHALEXIN 500 MG/1
500 TABLET ORAL 3 TIMES DAILY
Qty: 30 | Refills: 0 | Status: DISCONTINUED | COMMUNITY
Start: 2022-02-18 | End: 2022-07-22

## 2022-07-22 NOTE — HEALTH RISK ASSESSMENT
Admission Reconciliation is Completed  Discharge Reconciliation is Completed [Never] : Never [No] : No [Medical reason not done] : Medical reason not done [de-identified] : non verbal [HIV test declined] : HIV test declined [Hepatitis C test declined] : Hepatitis C test declined [Change in mental status noted] : No change in mental status noted [Language] : difficulty with language [Behavior] : difficulty with behavior [Learning/Retaining New Information] : difficulty learning/retaining new information [Handling Complex Tasks] : difficulty handling complex tasks [Reasoning] : difficulty with reasoning [Spatial Ability and Orientation] : difficulty with spatial ability and orientation [None] : None [With Family] : lives with family [On disability] : on disability [Retired] : retired [] :  [Sexually Active] : not sexually active [High Risk Behavior] : no high risk behavior [Feels Safe at Home] : Feels safe at home [Reports changes in hearing] : Reports no changes in hearing [Reports changes in vision] : Reports no changes in vision [Reports normal functional visual acuity (ie: able to read med bottle)] : Reports poor functional visual acuity.  [Reports changes in dental health] : Reports no changes in dental health [Smoke Detector] : smoke detector [Carbon Monoxide Detector] : carbon monoxide detector [Guns at Home] : no guns at home [Safety elements used in home] : safety elements used in home [Seat Belt] :  uses seat belt [Sunscreen] : does not use sunscreen [Travel to Developing Areas] : does not  travel to developing areas [TB Exposure] : is not being exposed to tuberculosis [Caregiver Concerns] : does not have caregiver concerns [ColonoscopyDate] : nonverbal [de-identified] : dementia baseline [de-identified] : needs care [de-identified] : needs care [With Patient/Caregiver] : , with patient/caregiver [Name: ___] : Health Care Proxy's Name: [unfilled]  [Aggressive treatment] : aggressive treatment [AdvancecareDate] : 7/22/22 [FreeTextEntry4] : willing to donate organs when time comes

## 2022-07-22 NOTE — PHYSICAL EXAM
[No Acute Distress] : no acute distress [Well Nourished] : well nourished [Well Developed] : well developed [Well-Appearing] : well-appearing [Normal Sclera/Conjunctiva] : normal sclera/conjunctiva [PERRL] : pupils equal round and reactive to light [EOMI] : extraocular movements intact [Normal Outer Ear/Nose] : the outer ears and nose were normal in appearance [Normal Oropharynx] : the oropharynx was normal [No JVD] : no jugular venous distention [No Lymphadenopathy] : no lymphadenopathy [Supple] : supple [Thyroid Normal, No Nodules] : the thyroid was normal and there were no nodules present [No Respiratory Distress] : no respiratory distress  [No Accessory Muscle Use] : no accessory muscle use [Clear to Auscultation] : lungs were clear to auscultation bilaterally [Normal Rate] : normal rate  [Regular Rhythm] : with a regular rhythm [Normal S1, S2] : normal S1 and S2 [No Murmur] : no murmur heard [No Carotid Bruits] : no carotid bruits [No Abdominal Bruit] : a ~M bruit was not heard ~T in the abdomen [No Varicosities] : no varicosities [Pedal Pulses Present] : the pedal pulses are present [No Edema] : there was no peripheral edema [No Palpable Aorta] : no palpable aorta [No Extremity Clubbing/Cyanosis] : no extremity clubbing/cyanosis [Soft] : abdomen soft [Non Tender] : non-tender [Non-distended] : non-distended [No Masses] : no abdominal mass palpated [No HSM] : no HSM [Normal Bowel Sounds] : normal bowel sounds [Normal Posterior Cervical Nodes] : no posterior cervical lymphadenopathy [Normal Anterior Cervical Nodes] : no anterior cervical lymphadenopathy [No CVA Tenderness] : no CVA  tenderness [No Spinal Tenderness] : no spinal tenderness [No Joint Swelling] : no joint swelling [Grossly Normal Strength/Tone] : grossly normal strength/tone [No Rash] : no rash [Coordination Grossly Intact] : coordination grossly intact [No Focal Deficits] : no focal deficits [Normal Gait] : normal gait [Deep Tendon Reflexes (DTR)] : deep tendon reflexes were 2+ and symmetric [Normal Affect] : the affect was normal [Normal Insight/Judgement] : insight and judgment were intact [de-identified] : wound care

## 2022-07-22 NOTE — HISTORY OF PRESENT ILLNESS
[FreeTextEntry1] : For cpe [de-identified] : For CPE\par patitent with dementia, urinary incontinence\par chronic venous stasis, needs wound care\par has been in hospital few times with cellulitis\par has no chest pain sob nvd or palpitations\par no fever no cough no sob\par eats well and is not verbal\par his mom takes care of him with her son and daughter involved

## 2022-07-22 NOTE — ASSESSMENT
[FreeTextEntry1] : refer to wound care for bilateral venous stasis chronic cellulits\par bp stabe dc norvasc\par stay on lisinopril\par labs in hospital ok ekg in hospital ok\par follow up 2 months

## 2022-08-15 ENCOUNTER — RX RENEWAL (OUTPATIENT)
Age: 62
End: 2022-08-15

## 2022-08-23 ENCOUNTER — EMERGENCY (EMERGENCY)
Facility: HOSPITAL | Age: 62
LOS: 1 days | Discharge: HOME | End: 2022-08-23
Attending: STUDENT IN AN ORGANIZED HEALTH CARE EDUCATION/TRAINING PROGRAM
Payer: MEDICARE

## 2022-08-23 VITALS
SYSTOLIC BLOOD PRESSURE: 139 MMHG | HEIGHT: 67 IN | TEMPERATURE: 98 F | OXYGEN SATURATION: 98 % | DIASTOLIC BLOOD PRESSURE: 100 MMHG | RESPIRATION RATE: 18 BRPM | HEART RATE: 102 BPM

## 2022-08-23 DIAGNOSIS — Z90.79 ACQUIRED ABSENCE OF OTHER GENITAL ORGAN(S): Chronic | ICD-10-CM

## 2022-08-23 DIAGNOSIS — Z98.890 OTHER SPECIFIED POSTPROCEDURAL STATES: Chronic | ICD-10-CM

## 2022-08-23 LAB
ALBUMIN SERPL ELPH-MCNC: 3.9 G/DL — SIGNIFICANT CHANGE UP (ref 3.3–5.2)
ALP SERPL-CCNC: 156 U/L — HIGH (ref 40–120)
ALT FLD-CCNC: 19 U/L — SIGNIFICANT CHANGE UP
ANION GAP SERPL CALC-SCNC: 12 MMOL/L — SIGNIFICANT CHANGE UP (ref 5–17)
APTT BLD: 28.2 SEC — SIGNIFICANT CHANGE UP (ref 27.5–35.5)
AST SERPL-CCNC: 26 U/L — SIGNIFICANT CHANGE UP
BASOPHILS # BLD AUTO: 0.05 K/UL — SIGNIFICANT CHANGE UP (ref 0–0.2)
BASOPHILS NFR BLD AUTO: 0.4 % — SIGNIFICANT CHANGE UP (ref 0–2)
BILIRUB SERPL-MCNC: 0.3 MG/DL — LOW (ref 0.4–2)
BUN SERPL-MCNC: 16.3 MG/DL — SIGNIFICANT CHANGE UP (ref 8–20)
CALCIUM SERPL-MCNC: 9.3 MG/DL — SIGNIFICANT CHANGE UP (ref 8.4–10.5)
CHLORIDE SERPL-SCNC: 105 MMOL/L — SIGNIFICANT CHANGE UP (ref 98–107)
CO2 SERPL-SCNC: 25 MMOL/L — SIGNIFICANT CHANGE UP (ref 22–29)
CREAT SERPL-MCNC: 1.1 MG/DL — SIGNIFICANT CHANGE UP (ref 0.5–1.3)
EGFR: 76 ML/MIN/1.73M2 — SIGNIFICANT CHANGE UP
EOSINOPHIL # BLD AUTO: 0.45 K/UL — SIGNIFICANT CHANGE UP (ref 0–0.5)
EOSINOPHIL NFR BLD AUTO: 3.8 % — SIGNIFICANT CHANGE UP (ref 0–6)
GLUCOSE SERPL-MCNC: 104 MG/DL — HIGH (ref 70–99)
HCT VFR BLD CALC: 45.4 % — SIGNIFICANT CHANGE UP (ref 39–50)
HGB BLD-MCNC: 14.7 G/DL — SIGNIFICANT CHANGE UP (ref 13–17)
IMM GRANULOCYTES NFR BLD AUTO: 0.3 % — SIGNIFICANT CHANGE UP (ref 0–1.5)
INR BLD: 1.11 RATIO — SIGNIFICANT CHANGE UP (ref 0.88–1.16)
LYMPHOCYTES # BLD AUTO: 1.7 K/UL — SIGNIFICANT CHANGE UP (ref 1–3.3)
LYMPHOCYTES # BLD AUTO: 14.3 % — SIGNIFICANT CHANGE UP (ref 13–44)
MCHC RBC-ENTMCNC: 29.3 PG — SIGNIFICANT CHANGE UP (ref 27–34)
MCHC RBC-ENTMCNC: 32.4 GM/DL — SIGNIFICANT CHANGE UP (ref 32–36)
MCV RBC AUTO: 90.6 FL — SIGNIFICANT CHANGE UP (ref 80–100)
MONOCYTES # BLD AUTO: 1.2 K/UL — HIGH (ref 0–0.9)
MONOCYTES NFR BLD AUTO: 10.1 % — SIGNIFICANT CHANGE UP (ref 2–14)
NEUTROPHILS # BLD AUTO: 8.44 K/UL — HIGH (ref 1.8–7.4)
NEUTROPHILS NFR BLD AUTO: 71.1 % — SIGNIFICANT CHANGE UP (ref 43–77)
PLATELET # BLD AUTO: 269 K/UL — SIGNIFICANT CHANGE UP (ref 150–400)
POTASSIUM SERPL-MCNC: 4.2 MMOL/L — SIGNIFICANT CHANGE UP (ref 3.5–5.3)
POTASSIUM SERPL-SCNC: 4.2 MMOL/L — SIGNIFICANT CHANGE UP (ref 3.5–5.3)
PROT SERPL-MCNC: 7.8 G/DL — SIGNIFICANT CHANGE UP (ref 6.6–8.7)
PROTHROM AB SERPL-ACNC: 12.9 SEC — SIGNIFICANT CHANGE UP (ref 10.5–13.4)
RBC # BLD: 5.01 M/UL — SIGNIFICANT CHANGE UP (ref 4.2–5.8)
RBC # FLD: 12.6 % — SIGNIFICANT CHANGE UP (ref 10.3–14.5)
SODIUM SERPL-SCNC: 142 MMOL/L — SIGNIFICANT CHANGE UP (ref 135–145)
WBC # BLD: 11.87 K/UL — HIGH (ref 3.8–10.5)
WBC # FLD AUTO: 11.87 K/UL — HIGH (ref 3.8–10.5)

## 2022-08-23 PROCEDURE — 73630 X-RAY EXAM OF FOOT: CPT | Mod: 26,RT

## 2022-08-23 PROCEDURE — 72125 CT NECK SPINE W/O DYE: CPT | Mod: 26,MA

## 2022-08-23 PROCEDURE — 73070 X-RAY EXAM OF ELBOW: CPT | Mod: 26,LT

## 2022-08-23 PROCEDURE — 99285 EMERGENCY DEPT VISIT HI MDM: CPT | Mod: GC

## 2022-08-23 PROCEDURE — 71045 X-RAY EXAM CHEST 1 VIEW: CPT | Mod: 26

## 2022-08-23 PROCEDURE — 70450 CT HEAD/BRAIN W/O DYE: CPT | Mod: 26,MA

## 2022-08-23 PROCEDURE — 73030 X-RAY EXAM OF SHOULDER: CPT | Mod: 26,LT

## 2022-08-23 PROCEDURE — 73590 X-RAY EXAM OF LOWER LEG: CPT | Mod: 26,RT

## 2022-08-23 PROCEDURE — 73060 X-RAY EXAM OF HUMERUS: CPT | Mod: 26,LT

## 2022-08-23 PROCEDURE — 71250 CT THORAX DX C-: CPT | Mod: 26,MA

## 2022-08-23 PROCEDURE — 72192 CT PELVIS W/O DYE: CPT | Mod: 26,MA

## 2022-08-23 RX ORDER — MORPHINE SULFATE 50 MG/1
4 CAPSULE, EXTENDED RELEASE ORAL ONCE
Refills: 0 | Status: COMPLETED | OUTPATIENT
Start: 2022-08-23 | End: 2022-08-23

## 2022-08-23 RX ORDER — SODIUM CHLORIDE 9 MG/ML
1000 INJECTION INTRAMUSCULAR; INTRAVENOUS; SUBCUTANEOUS ONCE
Refills: 0 | Status: DISCONTINUED | OUTPATIENT
Start: 2022-08-23 | End: 2022-08-23

## 2022-08-23 NOTE — ED PROVIDER NOTE - CLINICAL SUMMARY MEDICAL DECISION MAKING FREE TEXT BOX
Patient is a 63 yo male with PMHx vascular dementia now aphasic, recent admission for RLE cellulitis DC'd on abx after being seen by podiatry BIBEMS from home after aide found bruising on patient's back. As per aide and patient family at bedside patient recently got home and finished his abx, his wound is healing, however patient was found to have multiple bruises on his back with concern for a fall. Patient has history of vascular dementia, nonverbal at baseline, vss, no FND, no signs of trauma, small bruise to L elbow, moving everything equally, RLE cellulitis well healing still erythematous, abx completed. Will obtain xrays to r/o fracture, CT to r/o bleed, labs to r/o anemia vs. infection, and reassess.

## 2022-08-23 NOTE — ED PROVIDER NOTE - ATTENDING CONTRIBUTION TO CARE
The patient seen and examined    Fall    JESSE, Marshal Garcia, performed the initial face to face bedside interview with this patient regarding history of present illness, review of symptoms and relevant past medical, social and family history.  I completed an independent physical examination.  I was the initial provider who evaluated this patient. I have signed out the follow up of any pending tests (i.e. labs, radiological studies) to the resident.  I have communicated the patient’s plan of care and disposition with the resident.

## 2022-08-23 NOTE — ED PROVIDER NOTE - PHYSICAL EXAMINATION
Constitutional: Well appearing, aphasic at baseline and in no apparent distress  ENMT: Airway patent nasal mucosa clear. Mouth with normal mucosa. Throat has no vesicles no oropharyngeal exudates and uvula is midline. No blood in the oropharynx  EYES: clear bilaterally, pupils equal, round and reactive to light  Cardiac: Regular rate, regular rhythm. Heart sounds S1, S2. No murmurs, rubs or gallops. Good capillary refill, 2+ pulses, no peripheral edema  Respiratory: Lungs CTAB, no use of accessory muscles, no crackles, satting 99% on RA in no distress  Gastrointestinal: Abdomen nondistended, non-tender, no rebound guarding or peritoneal signs  Genitourinary: No CVA tenderness, pelvis nontender, bladder nondistended  Musculoskeletal: Spine appears normal, range of motion is not limited, no muscle or joint tenderness  Neurological: Alert, no focal deficits, no motor or sensory deficits. CN 2-12 intact, PERRLA, EOMI, No FND, 5/5 strength  Skin: Small abrasion to L elbow. RLE in bandage, mildly edematous, erythematous, no drainage no open wounds, well healing

## 2022-08-23 NOTE — ED ADULT NURSE NOTE - OBJECTIVE STATEMENT
Pt is non verbal with hx of dementia and autism, BIBA from home for multiple bruises, redness and swelling to his L elbow.  Pt shows no s/s pain.  Mother at beside states she was away for a few days  and he was at home with his aides around the clock.  States the aide came today and insisted he come to ED for evaluation for the bruises.  Mother states that pt hasn't had a fall in a few months and wants to have his elbow evaluated. Pt is non verbal from stoke in the past and  hx of dementia.  BIBA from home for multiple bruises, redness and swelling to his L elbow.  Pt shows no s/s pain.  Mother at beside states she was away for a few days  and he was at home with his aides around the clock.  States the aide came today and insisted he come to ED for evaluation for the bruises.  Mother states that pt hasn't had a fall in a few months and wants to have his elbow evaluated.  Pt awaiting CT scan/

## 2022-08-23 NOTE — ED PROVIDER NOTE - OBJECTIVE STATEMENT
Patient is a 63 yo male with PMHx vascular dementia now aphasic, recent admission for RLE cellulitis DC'd on abx after being seen by podiatry BIBEMS from home after aide found bruising on patient's back. As per aide and patient family at bedside patient recently got home and finished his abx, his wound is healing, however patient was found to have multiple bruises on his back with concern for a fall. Patient has history of vascular dementia, nonverbal at baseline, vss, no FND, no signs of trauma. Denies fevers, syncope, cough, congestion, diarrhea, hematuria, hematochezia, hematemesis, n/v, recent travel, sick contacts.

## 2022-08-23 NOTE — ED PROVIDER NOTE - PROGRESS NOTE DETAILS
pain controlled resting in bed comfrotably sling for LUE fx, tried to contact brother and mother from info in chart no answer pt appears nonverbal unsafe dispo home at this time. will place on virtual obs and have PT MARIA ANTONIA zavala in AM

## 2022-08-23 NOTE — ED PROVIDER NOTE - WET READ LAUNCH FT
There are 4 Wet Read(s) to document. There are no Wet Read(s) to document. There is 1 Wet Read(s) to document.

## 2022-08-23 NOTE — ED ADULT TRIAGE NOTE - CHIEF COMPLAINT QUOTE
Unwitnessed fall, frequent falls at home, found by home aid. non verbal at baseline, hx dementia and " brain condition"  home aid called EMS noticed bruising to right side. Recently treated for right lower leg cellulitis.

## 2022-08-24 VITALS
OXYGEN SATURATION: 98 % | SYSTOLIC BLOOD PRESSURE: 134 MMHG | TEMPERATURE: 98 F | HEART RATE: 68 BPM | DIASTOLIC BLOOD PRESSURE: 74 MMHG | RESPIRATION RATE: 18 BRPM

## 2022-08-24 LAB — SARS-COV-2 RNA SPEC QL NAA+PROBE: SIGNIFICANT CHANGE UP

## 2022-08-24 PROCEDURE — 80053 COMPREHEN METABOLIC PANEL: CPT

## 2022-08-24 PROCEDURE — 73590 X-RAY EXAM OF LOWER LEG: CPT

## 2022-08-24 PROCEDURE — 72192 CT PELVIS W/O DYE: CPT | Mod: MA

## 2022-08-24 PROCEDURE — 71045 X-RAY EXAM CHEST 1 VIEW: CPT

## 2022-08-24 PROCEDURE — U0003: CPT

## 2022-08-24 PROCEDURE — U0005: CPT

## 2022-08-24 PROCEDURE — 73030 X-RAY EXAM OF SHOULDER: CPT

## 2022-08-24 PROCEDURE — 99236 HOSP IP/OBS SAME DATE HI 85: CPT

## 2022-08-24 PROCEDURE — 71250 CT THORAX DX C-: CPT | Mod: MA

## 2022-08-24 PROCEDURE — 73070 X-RAY EXAM OF ELBOW: CPT

## 2022-08-24 PROCEDURE — 36415 COLL VENOUS BLD VENIPUNCTURE: CPT

## 2022-08-24 PROCEDURE — 85730 THROMBOPLASTIN TIME PARTIAL: CPT

## 2022-08-24 PROCEDURE — G0378: CPT

## 2022-08-24 PROCEDURE — 99284 EMERGENCY DEPT VISIT MOD MDM: CPT | Mod: 25

## 2022-08-24 PROCEDURE — 70450 CT HEAD/BRAIN W/O DYE: CPT | Mod: MA

## 2022-08-24 PROCEDURE — 36000 PLACE NEEDLE IN VEIN: CPT

## 2022-08-24 PROCEDURE — 85610 PROTHROMBIN TIME: CPT

## 2022-08-24 PROCEDURE — 73060 X-RAY EXAM OF HUMERUS: CPT

## 2022-08-24 PROCEDURE — 73630 X-RAY EXAM OF FOOT: CPT

## 2022-08-24 PROCEDURE — 85025 COMPLETE CBC W/AUTO DIFF WBC: CPT

## 2022-08-24 PROCEDURE — 72125 CT NECK SPINE W/O DYE: CPT | Mod: MA

## 2022-08-24 NOTE — ED CDU PROVIDER INITIAL DAY NOTE - OBJECTIVE STATEMENT
Patient is a 61 yo male with PMHx vascular dementia now aphasic, recent admission for RLE cellulitis DC'd on abx after being seen by podiatry BIBEMS from home after aide found bruising on patient's back. As per aide and patient family at bedside patient recently got home and finished his abx, his wound is healing, however patient was found to have multiple bruises on his back with concern for a fall. Patient has history of vascular dementia, nonverbal at baseline, vss, no FND, no signs of trauma. Denies fevers, syncope, cough, congestion, diarrhea, hematuria, hematochezia, hematemesis, n/v, recent travel, sick contacts.

## 2022-08-24 NOTE — PROVIDER CONTACT NOTE (OTHER) - ASSESSMENT
This Cm spoke to the mother at the bedside and the brother Sean [phone] to make them aware the patient is discharged. Both the mother and the brother agreeable to the dc plan, and the brother Sean will transport the patient home today.
Pt with 0/10 c/o pain before and after rx.   Pt non-verbal and does not follow commands and not in need of skilled PT, will no longer follow . Pt left supine in bed in no apparent distress and call bell within reach.
This Cm spoke with the brother Sean who confirmed the HHA is in place and Sean will transport his brother home today. RIKKI Delong [phone] aware of the dc plan. Referral faxed to known [facility] to make them aware patient gong home today.

## 2022-08-24 NOTE — PHYSICAL THERAPY INITIAL EVALUATION ADULT - REHAB POTENTIAL, PT EVAL
Pt non-verbal and does not follow commands and not in need of skilled PT, will no longer follow/none

## 2022-08-24 NOTE — ED ADULT NURSE REASSESSMENT NOTE - NS ED NURSE REASSESS COMMENT FT1
Received report from [facility] 2245, Pt a&o x0, VSS, respirations even and unlabored, no distress noted.

## 2022-08-24 NOTE — ED ADULT NURSE REASSESSMENT NOTE - NS ED NURSE REASSESS COMMENT FT1
Received report from JOSE Baugh for continuity of care. Pt is on 1:1 constant observation. VSS. NAD. Safety precautions maintained.

## 2022-08-24 NOTE — ED CDU PROVIDER DISPOSITION NOTE - PATIENT PORTAL LINK FT
You can access the FollowMyHealth Patient Portal offered by [facility] by registering at the following website: [email or web address]. By joining [employer]’s FollowMyHealth portal, you will also be able to view your health information using other applications (apps) compatible with our system.

## 2022-08-24 NOTE — PHYSICAL THERAPY INITIAL EVALUATION ADULT - RANGE OF MOTION EXAMINATION, REHAB EVAL
Left UE & LE: limited t/o/Right UE ROM was WNL (within normal limits)/Right LE ROM was WNL (within normal limits)

## 2022-08-24 NOTE — ED CDU PROVIDER DISPOSITION NOTE - CARE PROVIDER_API CALL
Keshawn Villanueva (DO)  Orthopedics  [address] [place] [id number]  Phone: [phone]  Fax: [phone]  Follow Up Time: 4-6 Days

## 2022-08-24 NOTE — ED CDU PROVIDER DISPOSITION NOTE - CLINICAL COURSE
63 yo male with PMHx vascular dementia now aphasic, bed bound BIBEMS from home after aide found patient on ground. found to have left humerus fx. sling applied. outpt ortho f/u. Mother at bedside reporting other son can take patient home. Per CM has 24h care at home

## 2022-08-25 NOTE — ED ADULT TRIAGE NOTE - HEIGHT IN INCHES
7 Quinolones Pregnancy And Lactation Text: This medication is Pregnancy Category C and it isn't know if it is safe during pregnancy. It is also excreted in breast milk.

## 2022-09-01 ENCOUNTER — RX RENEWAL (OUTPATIENT)
Age: 62
End: 2022-09-01

## 2022-09-08 ENCOUNTER — APPOINTMENT (OUTPATIENT)
Dept: ORTHOPEDIC SURGERY | Facility: CLINIC | Age: 62
End: 2022-09-08

## 2022-09-08 VITALS — WEIGHT: 164 LBS | HEIGHT: 67 IN | BODY MASS INDEX: 25.74 KG/M2

## 2022-09-08 PROCEDURE — 23600 CLTX PROX HUMRL FX W/O MNPJ: CPT

## 2022-09-08 PROCEDURE — 99203 OFFICE O/P NEW LOW 30 MIN: CPT | Mod: 57

## 2022-09-08 PROCEDURE — 73030 X-RAY EXAM OF SHOULDER: CPT | Mod: LT

## 2022-09-08 NOTE — REASON FOR VISIT
[Initial Visit] : an initial visit for [Family Member] : family member [FreeTextEntry2] : Left shoulder fracture

## 2022-09-08 NOTE — HISTORY OF PRESENT ILLNESS
[de-identified] : 9/8/2022: Samir is a pleasant 62-year-old male with a past medical history of vascular dementia who presents to the office today with his brother who is his legal guardian for evaluation of a left shoulder injury.  Per the patient's brother, towards the first or second week of August he started noticing bruising and swelling about the patient's left shoulder and arm.  They deny any injury that they can recall.  He was brought to the emergency room at [facility] on 8/23/2022 where x-rays were taken and demonstrated a nondisplaced left proximal humerus fracture.  The patient is nonverbal and is mostly wheelchair-bound.  He does use his arm to push off to stand to occasionally walk around but spends most of the day in a seated position.  Per the patient's brother the bruising and swelling have significantly improved and the patient does not seem to be complaining of pain anymore.  The present today for specialist opinion.  They deny any previous injury to the shoulder.  No fevers, chills, sweats, or pain elsewhere.

## 2022-09-08 NOTE — PHYSICAL EXAM
[de-identified] : General:\par Awake, alert, no acute distress, Patient was cooperative and appropriate during the examination.  He is nonverbal.\par \par The patient is of normal weight for height and age.\par \par Patient presents in a wheelchair today.\par \par Full, painless range of motion of the neck and back.\par \par Pulmonary:\par Regular, nonlabored breathing\par \par Abdomen:\par Soft, nontender, nondistended.\par \par Lymphatic:\par No evidence of axillary lymphadenopathy\par \par Left shoulder Exam:\par Physical exam of the shoulder demonstrates normal skin without signs of skin changes or abnormalities. No erythema, warmth, or joint effusion appreciated.  Resolving soft tissue swelling and ecchymosis about the arm appreciated.\par \par Sensation intact light touch C5-T1\par Palpable radial pulse\par Radial/ulnar/median/axillary/musculocutaneous/AIN/PIN nerves grossly intact\par \par Range of motion:\par Not tested today secondary to fracture\par \par Palpation:\par Not tender to palpation over the glenohumeral joint\par Mildly tender to palpation about the left proximal humerus\par Not tender palpation over the rotator cuff insertion on the greater tuberosity\par Not tender to palpation over the AC joint\par Not tender to palpation of the biceps tendon/bicipital groove\par \par Strength testing:\par Not tested today [de-identified] : X-rays including multiple views of the left shoulder and humerus from [facility] in August 2022 reviewed today.  There is an acute nondisplaced fracture of the left proximal humerus in acceptable alignment.\par \par X-rays including 2 views of the left shoulder were obtained in the office today 9/8/2022 and reviewed the patient.  There is a subacute nondisplaced left proximal humerus fracture in acceptable alignment.  No significant displacement compared to hospital images.  There is suggestion of very early callus formation about the fracture site.

## 2022-09-08 NOTE — DISCUSSION/SUMMARY
[de-identified] : Assessment: 62-year-old male with a past medical history of vascular dementia with a left nondisplaced proximal humerus fracture\par \par Plan: I had a long discussion with the patient and his brother today regarding the nature of their diagnosis and treatment plan. We discussed the risks and benefits of no treatment as well as nonoperative and operative treatments.  I reviewed the patient's x-rays today with him in the office which demonstrate a nondisplaced left proximal humerus fracture and acceptable alignment.  There is suggestion of early callus formation, however, on examination the patient does have pain about the proximal humerus.  Nonsurgical treatment is indicated.  He will remain nonweightbearing and use a sling as needed at this point.  He may ice the shoulder for any residual swelling and take Tylenol for pain.  Recommend follow-up in 2 weeks for repeat clinical and radiographic evaluation.  If the patient has further healing on x-rays at that time with improvement in his pain clinically we will initiate a physical therapy program.\par \par The patient verbalizes their understanding and agrees with the plan.  All questions were answered to their satisfaction.

## 2022-09-22 ENCOUNTER — APPOINTMENT (OUTPATIENT)
Dept: ORTHOPEDIC SURGERY | Facility: CLINIC | Age: 62
End: 2022-09-22

## 2022-09-22 VITALS — WEIGHT: 164 LBS | HEIGHT: 67 IN | BODY MASS INDEX: 25.74 KG/M2 | HEART RATE: 80 BPM

## 2022-09-22 PROCEDURE — 99024 POSTOP FOLLOW-UP VISIT: CPT

## 2022-09-22 PROCEDURE — 73030 X-RAY EXAM OF SHOULDER: CPT | Mod: LT

## 2022-09-22 NOTE — PHYSICAL EXAM
[de-identified] : General:\par Awake, alert, no acute distress, Patient was cooperative and appropriate during the examination.  He is nonverbal.\par \par The patient is of normal weight for height and age.\par \par Patient presents in a wheelchair today.\par \par Full, painless range of motion of the neck and back.\par \par Pulmonary:\par Regular, nonlabored breathing\par \par Abdomen:\par Soft, nontender, nondistended.\par \par Lymphatic:\par No evidence of axillary lymphadenopathy\par \par Left shoulder Exam:\par Physical exam of the shoulder demonstrates normal skin without signs of skin changes or abnormalities. No erythema, warmth, or joint effusion appreciated.  Resolving soft tissue swelling and ecchymosis about the arm appreciated.\par \par Sensation intact light touch C5-T1\par Palpable radial pulse\par Radial/ulnar/median/axillary/musculocutaneous/AIN/PIN nerves grossly intact\par \par Range of motion:\par Not tested today secondary to fracture\par \par Palpation:\par Not tender to palpation over the glenohumeral joint\par Minimally tender to palpation about the left proximal humerus\par Not tender palpation over the rotator cuff insertion on the greater tuberosity\par Not tender to palpation over the AC joint\par Not tender to palpation of the biceps tendon/bicipital groove\par \par Strength testing:\par Not tested today [de-identified] : X-ray 2 views of the left shoulder taken in the office today on 9/22/2022 shows a healing minimally displaced proximal humerus fracture in overall acceptable alignment with no other acute findings and callus formation noted.\par \par X-rays including multiple views of the left shoulder and humerus from [facility] in August 2022 reviewed today.  There is an acute minimally displaced fracture of the left proximal humerus in acceptable alignment.\par \par X-rays including 2 views of the left shoulder were obtained in the office today 9/8/2022 and reviewed the patient.  There is a subacute nondisplaced left proximal humerus fracture in acceptable alignment.  No significant displacement compared to hospital images.  There is suggestion of very early callus formation about the fracture site.

## 2022-09-22 NOTE — DISCUSSION/SUMMARY
[de-identified] : Assessment: 62-year-old male with a past medical history of vascular dementia with a left nondisplaced proximal humerus fracture\par \par Plan: I had a long discussion with the patient and his brother today regarding the nature of their diagnosis and treatment plan. We discussed the risks and benefits of no treatment as well as nonoperative and operative treatments.  I reviewed the patient's x-rays today with him in the office which demonstrate a nondisplaced left proximal humerus fracture and acceptable alignment, healing.  At this time he can discontinue the sling and can perform range of motion as tolerated.  He will avoid any heavy lifting, pushing pulling with the left upper extremity and avoid weightbearing to left upper extremity and will follow-up in 6 weeks for repeat evaluation and likely final x-rays at that time.  Patient discussed and reviewed with Dr. Villanueva today.\par \par The patient verbalizes their understanding and agrees with the plan.  All questions were answered to their satisfaction.

## 2022-09-22 NOTE — REASON FOR VISIT
[Other: ____] : [unfilled] [Initial Visit] : an initial visit for [Family Member] : family member [FreeTextEntry2] : Left shoulder fracture

## 2022-09-22 NOTE — HISTORY OF PRESENT ILLNESS
[de-identified] : 09/22/2022 : SAMIR ARMANDO  is a 62 year year old male who presents to the office for evaluation of his left shoulder.  He is with his brother who is his legal guardian.  The date of injury was 8/23/2022.  He states that he has not noticed that he has been having any discomfort in the shoulder and has been compliant with his restrictions with weightbearing.  He is here for routine follow-up today.  He denies any numbness or tingling distally.  He has no other complaints today.\par \par 9/8/2022: Samir is a pleasant 62-year-old male with a past medical history of vascular dementia who presents to the office today with his brother who is his legal guardian for evaluation of a left shoulder injury.  Per the patient's brother, towards the first or second week of August he started noticing bruising and swelling about the patient's left shoulder and arm.  They deny any injury that they can recall.  He was brought to the emergency room at [facility] on 8/23/2022 where x-rays were taken and demonstrated a nondisplaced left proximal humerus fracture.  The patient is nonverbal and is mostly wheelchair-bound.  He does use his arm to push off to stand to occasionally walk around but spends most of the day in a seated position.  Per the patient's brother the bruising and swelling have significantly improved and the patient does not seem to be complaining of pain anymore.  The present today for specialist opinion.  They deny any previous injury to the shoulder.  No fevers, chills, sweats, or pain elsewhere.

## 2022-11-11 ENCOUNTER — APPOINTMENT (OUTPATIENT)
Dept: INTERNAL MEDICINE | Facility: CLINIC | Age: 62
End: 2022-11-11

## 2022-11-23 ENCOUNTER — APPOINTMENT (OUTPATIENT)
Dept: ORTHOPEDIC SURGERY | Facility: CLINIC | Age: 62
End: 2022-11-23

## 2022-11-23 VITALS — BODY MASS INDEX: 29.73 KG/M2 | HEIGHT: 66 IN | WEIGHT: 185 LBS

## 2022-11-23 DIAGNOSIS — S42.209A UNSPECIFIED FRACTURE OF UPPER END OF UNSPECIFIED HUMERUS, INITIAL ENCOUNTER FOR CLOSED FRACTURE: ICD-10-CM

## 2022-11-23 PROCEDURE — 99024 POSTOP FOLLOW-UP VISIT: CPT

## 2022-12-02 DIAGNOSIS — U07.1 COVID-19: ICD-10-CM

## 2022-12-21 NOTE — HISTORY OF PRESENT ILLNESS
[Family Member] : family member [FreeTextEntry1] : follow up bp and lipid\par has neurocognitive impairment [de-identified] : for follow up\par patient is now non verbal\par his neurocognitive impairment is stable\par his brother states the patient has been stable\par he has no new chest pain sob nvd or palpitations.  no

## 2023-01-23 ENCOUNTER — NON-APPOINTMENT (OUTPATIENT)
Age: 63
End: 2023-01-23

## 2023-03-22 ENCOUNTER — EMERGENCY (EMERGENCY)
Facility: HOSPITAL | Age: 63
LOS: 1 days | Discharge: HOME | End: 2023-03-22
Attending: EMERGENCY MEDICINE
Payer: MEDICARE

## 2023-03-22 VITALS
RESPIRATION RATE: 16 BRPM | TEMPERATURE: 97 F | OXYGEN SATURATION: 99 % | DIASTOLIC BLOOD PRESSURE: 64 MMHG | WEIGHT: 164.91 LBS | HEART RATE: 65 BPM | SYSTOLIC BLOOD PRESSURE: 115 MMHG | HEIGHT: 67 IN

## 2023-03-22 DIAGNOSIS — Z98.890 OTHER SPECIFIED POSTPROCEDURAL STATES: Chronic | ICD-10-CM

## 2023-03-22 DIAGNOSIS — Z90.79 ACQUIRED ABSENCE OF OTHER GENITAL ORGAN(S): Chronic | ICD-10-CM

## 2023-03-22 PROCEDURE — 73590 X-RAY EXAM OF LOWER LEG: CPT

## 2023-03-22 PROCEDURE — 87040 BLOOD CULTURE FOR BACTERIA: CPT

## 2023-03-22 PROCEDURE — 73630 X-RAY EXAM OF FOOT: CPT | Mod: 26,LT

## 2023-03-22 PROCEDURE — 99285 EMERGENCY DEPT VISIT HI MDM: CPT | Mod: 25

## 2023-03-22 PROCEDURE — 73630 X-RAY EXAM OF FOOT: CPT

## 2023-03-22 PROCEDURE — 99284 EMERGENCY DEPT VISIT MOD MDM: CPT

## 2023-03-22 PROCEDURE — 73590 X-RAY EXAM OF LOWER LEG: CPT | Mod: 26,RT

## 2023-03-22 NOTE — ED ADULT NURSE NOTE - NSIMPLEMENTINTERV_GEN_ALL_ED
Implemented All Fall Risk Interventions:  [place] to call system. Call bell, personal items and telephone within reach. Instruct patient to call for assistance. Room bathroom lighting operational. Non-slip footwear when patient is off stretcher. Physically safe environment: no spills, clutter or unnecessary equipment. Stretcher in lowest position, wheels locked, appropriate side rails in place. Provide visual cue, wrist band, yellow gown, etc. Monitor gait and stability. Monitor for mental status changes and reorient to person, place, and time. Review medications for side effects contributing to fall risk. Reinforce activity limits and safety measures with patient and family.

## 2023-03-22 NOTE — ED PROVIDER NOTE - NSFOLLOWUPCLINICS_GEN_ALL_ED_FT
[facility] Vascular Surgery  Vascular Surgery  [address] [place] [address]  Phone: [phone]  Fax:

## 2023-03-22 NOTE — ED PROVIDER NOTE - PATIENT PORTAL LINK FT
You can access the FollowMyHealth Patient Portal offered by [facility] by registering at the following website: [email or web address]. By joining [employer]’s FollowMyHealth portal, you will also be able to view your health information using other applications (apps) compatible with our system.

## 2023-03-22 NOTE — ED PROVIDER NOTE - PROGRESS NOTE DETAILS
Received signout from Dr. Anne.  Correction to her note brother who is patient's guardian at bedside is concerned with patient's chronic right lower extremity leg and foot scaling/wound that has been present for 1 year.  I have unwrapped the dressing examine the right lower extremity it is not swollen there is no increased skin temperature purulent discharge crepitus it is scaly and chronic in nature.  I do not feel this is an acute cellulitis.  He has chronic venous stasis changes on the right lower extremity.  He has palpable distal pulses to this extremity.  He is moving the extremity in no acute distress patient's vital signs are stable.  I have recommended vascular follow-up.  I do not feel this warrants an acute emergent work-up in the emergency department.  Patient's brother at bedside who is the power of  agrees to this plan of care.  Patient is stable for discharge.

## 2023-03-22 NOTE — ED PROVIDER NOTE - CLINICAL SUMMARY MEDICAL DECISION MAKING FREE TEXT BOX
Initial Presentation (GUNNAR Anne):  63-year-old male; with past medical history significant for hypertension, hyperlipidemia, dementia; now presenting with chronic left lower extremity wound.   will do labs and xray to  further eval. Patient signed out to incoming physician.  All decisions regarding the progression of care will be made at their discretion.

## 2023-03-22 NOTE — ED ADULT TRIAGE NOTE - CHIEF COMPLAINT QUOTE
pt BIBA from home @ baseline mental status, non verbal. per EMS, pt has RLE cellulitis, ace wrapped x 1 year and there has been no change.

## 2023-03-22 NOTE — ED PROVIDER NOTE - NSFOLLOWUPINSTRUCTIONS_ED_ALL_ED_FT
Return to ED for temperature greater than 101 °F increased right lower extremity swelling or any new onset motor or sensory deficits.  Brother at bedside agrees to plan of care follow-up with recommended vascular surgeon advised.

## 2023-03-22 NOTE — ED PROVIDER NOTE - OBJECTIVE STATEMENT
63-year-old male; with past medical history significant for hypertension, hyperlipidemia, dementia; now presenting with chronic left lower extremity wound.  Patient is being seen by wound care nurse at home for the past year.  Family reports that the wound is not getting better.  States it does not look much worse but it is not improving and they were concerned about persistence of the erythema over the lower extremity.  Denies fever or chills.  Denies nausea or vomiting.  Denies abdominal pain.  Denies any trauma.  PMH: HTN, HLD, Dementia  SOCIAL: no substance use

## 2023-03-22 NOTE — ED PROVIDER NOTE - PHYSICAL EXAMINATION
General:     NAD  Head:     NC/AT, EOMI, oral mucosa moist  Neck:     trachea midline  Lungs:     CTA b/l, no w/r/r  CVS:     S1S2, RRR, no m/g/r  Abd:     +BS, s/nt/nd, no organomegaly  Ext:  partially contracted extremities.  L LE:  erythema over lower leg with scaling.  fluctuance over plantar surface.   Neuro: non-verbal, baseline.

## 2023-03-22 NOTE — ED PROVIDER NOTE - NSICDXPASTSURGICALHX_GEN_ALL_CORE_FT
On 2 g daily of ceftriaxone for strep pneumonia as well as strep pneumo bacteremia secondary to pneumonia in all likelihood  The patient is also likely immunocompromised following a LL in the past   He is on Decadron and this is slowly being tapered  I have tapered down Decadron further to 2 mg in the morning and 1 mg afternoon and evening  I have discussed this with Neurosurgery in given his ongoing infection they are okay with tapering down steroids for now  Infectious Disease are on board treating bacteremia and pneumonia and the plan is to transition to oral antibiotics on Monday and likely the patient can be discharged home  He does not have any temperatures and his white cell count is stable his vitals are otherwise stable  PAST SURGICAL HISTORY:  No significant past surgical history

## 2023-04-28 ENCOUNTER — APPOINTMENT (OUTPATIENT)
Dept: INTERNAL MEDICINE | Facility: CLINIC | Age: 63
End: 2023-04-28
Payer: MEDICARE

## 2023-04-28 VITALS — SYSTOLIC BLOOD PRESSURE: 116 MMHG | RESPIRATION RATE: 12 BRPM | DIASTOLIC BLOOD PRESSURE: 82 MMHG | HEART RATE: 72 BPM

## 2023-04-28 VITALS — BODY MASS INDEX: 29.73 KG/M2 | WEIGHT: 185 LBS | HEIGHT: 66 IN

## 2023-04-28 PROCEDURE — 99214 OFFICE O/P EST MOD 30 MIN: CPT

## 2023-04-28 RX ORDER — NUT.TX.IMPAIRED DIGESTIVE FXN 0.035-1/ML
LIQUID (ML) ORAL
Qty: 1 | Refills: 0 | Status: ACTIVE | COMMUNITY
Start: 2023-04-28 | End: 1900-01-01

## 2023-04-28 RX ORDER — NIRMATRELVIR AND RITONAVIR 300-100 MG
20 X 150 MG & KIT ORAL
Qty: 1 | Refills: 0 | Status: DISCONTINUED | COMMUNITY
Start: 2022-12-02 | End: 2023-04-28

## 2023-04-28 NOTE — HISTORY OF PRESENT ILLNESS
[FreeTextEntry1] : for follow up  [de-identified] : for follow up\par patient needs a chair lift, today in wheel chair\par he has advanced dementia, is wheelchair bound now\par can walk but congitively difficult\par is alert but not verbal\par no chest pain sob nvd or palpitatons\par has chronic venous stasis has not seen a vascular doctor

## 2023-04-28 NOTE — ASSESSMENT
[FreeTextEntry1] : advanced dementia\par needs chair lift or seat lift device\par bp stable\par hld to be checked at time of cpe\par patient appears at baseline

## 2023-04-28 NOTE — PHYSICAL EXAM
[No Acute Distress] : no acute distress [Well Nourished] : well nourished [Well Developed] : well developed [Well-Appearing] : well-appearing [Normal Sclera/Conjunctiva] : normal sclera/conjunctiva [PERRL] : pupils equal round and reactive to light [EOMI] : extraocular movements intact [Normal Outer Ear/Nose] : the outer ears and nose were normal in appearance [Normal Oropharynx] : the oropharynx was normal [No JVD] : no jugular venous distention [No Lymphadenopathy] : no lymphadenopathy [Supple] : supple [Thyroid Normal, No Nodules] : the thyroid was normal and there were no nodules present [No Respiratory Distress] : no respiratory distress  [No Accessory Muscle Use] : no accessory muscle use [Clear to Auscultation] : lungs were clear to auscultation bilaterally [Normal Rate] : normal rate  [Regular Rhythm] : with a regular rhythm [Normal S1, S2] : normal S1 and S2 [No Murmur] : no murmur heard [No Carotid Bruits] : no carotid bruits [No Abdominal Bruit] : a ~M bruit was not heard ~T in the abdomen [No Varicosities] : no varicosities [Pedal Pulses Present] : the pedal pulses are present [No Edema] : there was no peripheral edema [No Palpable Aorta] : no palpable aorta [No Extremity Clubbing/Cyanosis] : no extremity clubbing/cyanosis [Soft] : abdomen soft [Non Tender] : non-tender [Non-distended] : non-distended [No Masses] : no abdominal mass palpated [No HSM] : no HSM [Normal Bowel Sounds] : normal bowel sounds [Normal Posterior Cervical Nodes] : no posterior cervical lymphadenopathy [Normal Anterior Cervical Nodes] : no anterior cervical lymphadenopathy [No CVA Tenderness] : no CVA  tenderness [No Spinal Tenderness] : no spinal tenderness [No Joint Swelling] : no joint swelling [Grossly Normal Strength/Tone] : grossly normal strength/tone [No Rash] : no rash [Normal Affect] : the affect was normal [Normal Insight/Judgement] : insight and judgment were intact [de-identified] : stiff left amr [de-identified] : chronic venous stasis [de-identified] : dementia

## 2023-05-09 ENCOUNTER — APPOINTMENT (OUTPATIENT)
Dept: VASCULAR SURGERY | Facility: CLINIC | Age: 63
End: 2023-05-09
Payer: MEDICARE

## 2023-05-09 VITALS
TEMPERATURE: 96.9 F | RESPIRATION RATE: 16 BRPM | DIASTOLIC BLOOD PRESSURE: 86 MMHG | HEART RATE: 88 BPM | OXYGEN SATURATION: 96 % | SYSTOLIC BLOOD PRESSURE: 133 MMHG

## 2023-05-09 DIAGNOSIS — M79.89 OTHER SPECIFIED SOFT TISSUE DISORDERS: ICD-10-CM

## 2023-05-09 DIAGNOSIS — I87.2 VENOUS INSUFFICIENCY (CHRONIC) (PERIPHERAL): ICD-10-CM

## 2023-05-09 PROCEDURE — 99203 OFFICE O/P NEW LOW 30 MIN: CPT

## 2023-05-09 RX ORDER — TRIAMCINOLONE ACETONIDE 1 MG/G
0.1 CREAM TOPICAL TWICE DAILY
Qty: 1 | Refills: 0 | Status: ACTIVE | COMMUNITY
Start: 2023-05-09 | End: 1900-01-01

## 2023-05-09 NOTE — PHYSICAL EXAM
[2+] : left 2+ [de-identified] : Patient is nonverbal.  She is contracted. [de-identified] : Normal work of breathing. [de-identified] : Right lower extremity with chronic skin changes throughout there is flaking of the skin particularly at the foot and ankle.  There is no current ulcers.

## 2023-05-09 NOTE — HISTORY OF PRESENT ILLNESS
[FreeTextEntry1] : Patient presents with a relative.  He is wheelchair-bound/limited mobility.  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  She experiences significant waxing and waning swelling of his right leg followed by episodic cellulitis as well as swelling.  He was not wearing compression stockings.  He is not using any other creams on his lower extremity.  She does have nursing twice a week.

## 2023-05-09 NOTE — ASSESSMENT
[FreeTextEntry1] : 63-year-old male with chronic right lower extremity swelling secondary to poor muscle tone and disuse.  I advised his level and that first we must try compression stockings.  He was provided with a prescription for compression stockings which is a mild 15 to 20 mmHg pressure.  If there is is not sufficient we can consider lymphedema pump with opacity used daily which would be very difficult for him.  He was provided with a prescription for triamcinolone cream which should be placed on his leg twice a day to improve his flaking and scaling skin and to hopefully prevent recurrent cellulitis.

## 2023-05-22 DIAGNOSIS — R53.1 WEAKNESS: ICD-10-CM

## 2023-08-20 ENCOUNTER — INPATIENT (INPATIENT)
Facility: HOSPITAL | Age: 63
LOS: 3 days | Discharge: HOME HEALTH | DRG: 871 | End: 2023-08-24
Attending: HOSPITALIST | Admitting: EMERGENCY MEDICINE
Payer: MEDICARE

## 2023-08-20 VITALS
RESPIRATION RATE: 20 BRPM | HEART RATE: 112 BPM | SYSTOLIC BLOOD PRESSURE: 133 MMHG | WEIGHT: 160.06 LBS | DIASTOLIC BLOOD PRESSURE: 80 MMHG | OXYGEN SATURATION: 92 % | TEMPERATURE: 98 F

## 2023-08-20 DIAGNOSIS — Z90.79 ACQUIRED ABSENCE OF OTHER GENITAL ORGAN(S): Chronic | ICD-10-CM

## 2023-08-20 DIAGNOSIS — L03.90 CELLULITIS, UNSPECIFIED: ICD-10-CM

## 2023-08-20 DIAGNOSIS — Z98.890 OTHER SPECIFIED POSTPROCEDURAL STATES: Chronic | ICD-10-CM

## 2023-08-20 LAB
ALBUMIN SERPL ELPH-MCNC: 3.4 G/DL — SIGNIFICANT CHANGE UP (ref 3.3–5.2)
ALP SERPL-CCNC: 74 U/L — SIGNIFICANT CHANGE UP (ref 40–120)
ALT FLD-CCNC: 21 U/L — SIGNIFICANT CHANGE UP
ANION GAP SERPL CALC-SCNC: 15 MMOL/L — SIGNIFICANT CHANGE UP (ref 5–17)
APPEARANCE UR: CLEAR — SIGNIFICANT CHANGE UP
AST SERPL-CCNC: 22 U/L — SIGNIFICANT CHANGE UP
BACTERIA # UR AUTO: ABNORMAL
BASOPHILS # BLD AUTO: 0.06 K/UL — SIGNIFICANT CHANGE UP (ref 0–0.2)
BASOPHILS NFR BLD AUTO: 0.3 % — SIGNIFICANT CHANGE UP (ref 0–2)
BILIRUB SERPL-MCNC: 0.4 MG/DL — SIGNIFICANT CHANGE UP (ref 0.4–2)
BILIRUB UR-MCNC: NEGATIVE — SIGNIFICANT CHANGE UP
BUN SERPL-MCNC: 16.9 MG/DL — SIGNIFICANT CHANGE UP (ref 8–20)
CALCIUM SERPL-MCNC: 9.2 MG/DL — SIGNIFICANT CHANGE UP (ref 8.4–10.5)
CHLORIDE SERPL-SCNC: 100 MMOL/L — SIGNIFICANT CHANGE UP (ref 96–108)
CO2 SERPL-SCNC: 26 MMOL/L — SIGNIFICANT CHANGE UP (ref 22–29)
COLOR SPEC: ABNORMAL
CREAT SERPL-MCNC: 1.3 MG/DL — SIGNIFICANT CHANGE UP (ref 0.5–1.3)
DIFF PNL FLD: ABNORMAL
EGFR: 62 ML/MIN/1.73M2 — SIGNIFICANT CHANGE UP
EOSINOPHIL # BLD AUTO: 0.01 K/UL — SIGNIFICANT CHANGE UP (ref 0–0.5)
EOSINOPHIL NFR BLD AUTO: 0 % — SIGNIFICANT CHANGE UP (ref 0–6)
EPI CELLS # UR: NEGATIVE — SIGNIFICANT CHANGE UP
GLUCOSE SERPL-MCNC: 133 MG/DL — HIGH (ref 70–99)
GLUCOSE UR QL: NEGATIVE MG/DL — SIGNIFICANT CHANGE UP
HCT VFR BLD CALC: 48.1 % — SIGNIFICANT CHANGE UP (ref 39–50)
HGB BLD-MCNC: 15.7 G/DL — SIGNIFICANT CHANGE UP (ref 13–17)
IMM GRANULOCYTES NFR BLD AUTO: 0.5 % — SIGNIFICANT CHANGE UP (ref 0–0.9)
KETONES UR-MCNC: NEGATIVE — SIGNIFICANT CHANGE UP
LACTATE BLDV-MCNC: 4.2 MMOL/L — CRITICAL HIGH (ref 0.5–2)
LACTATE SERPL-SCNC: 2 MMOL/L — SIGNIFICANT CHANGE UP (ref 0.5–2)
LEUKOCYTE ESTERASE UR-ACNC: ABNORMAL
LYMPHOCYTES # BLD AUTO: 1.27 K/UL — SIGNIFICANT CHANGE UP (ref 1–3.3)
LYMPHOCYTES # BLD AUTO: 6.3 % — LOW (ref 13–44)
MCHC RBC-ENTMCNC: 29.1 PG — SIGNIFICANT CHANGE UP (ref 27–34)
MCHC RBC-ENTMCNC: 32.6 GM/DL — SIGNIFICANT CHANGE UP (ref 32–36)
MCV RBC AUTO: 89.1 FL — SIGNIFICANT CHANGE UP (ref 80–100)
MONOCYTES # BLD AUTO: 1.31 K/UL — HIGH (ref 0–0.9)
MONOCYTES NFR BLD AUTO: 6.5 % — SIGNIFICANT CHANGE UP (ref 2–14)
NEUTROPHILS # BLD AUTO: 17.42 K/UL — HIGH (ref 1.8–7.4)
NEUTROPHILS NFR BLD AUTO: 86.4 % — HIGH (ref 43–77)
NITRITE UR-MCNC: NEGATIVE — SIGNIFICANT CHANGE UP
PH UR: 6 — SIGNIFICANT CHANGE UP (ref 5–8)
PLATELET # BLD AUTO: 315 K/UL — SIGNIFICANT CHANGE UP (ref 150–400)
POTASSIUM SERPL-MCNC: 4.2 MMOL/L — SIGNIFICANT CHANGE UP (ref 3.5–5.3)
POTASSIUM SERPL-SCNC: 4.2 MMOL/L — SIGNIFICANT CHANGE UP (ref 3.5–5.3)
PROT SERPL-MCNC: 7.6 G/DL — SIGNIFICANT CHANGE UP (ref 6.6–8.7)
PROT UR-MCNC: 30 MG/DL
RAPID RVP RESULT: SIGNIFICANT CHANGE UP
RBC # BLD: 5.4 M/UL — SIGNIFICANT CHANGE UP (ref 4.2–5.8)
RBC # FLD: 12.1 % — SIGNIFICANT CHANGE UP (ref 10.3–14.5)
RBC CASTS # UR COMP ASSIST: ABNORMAL /HPF (ref 0–4)
SARS-COV-2 RNA SPEC QL NAA+PROBE: SIGNIFICANT CHANGE UP
SODIUM SERPL-SCNC: 141 MMOL/L — SIGNIFICANT CHANGE UP (ref 135–145)
SP GR SPEC: 1.01 — SIGNIFICANT CHANGE UP (ref 1.01–1.02)
UROBILINOGEN FLD QL: 1 MG/DL
WBC # BLD: 20.17 K/UL — HIGH (ref 3.8–10.5)
WBC # FLD AUTO: 20.17 K/UL — HIGH (ref 3.8–10.5)
WBC UR QL: SIGNIFICANT CHANGE UP /HPF (ref 0–5)

## 2023-08-20 PROCEDURE — 99285 EMERGENCY DEPT VISIT HI MDM: CPT | Mod: GC

## 2023-08-20 PROCEDURE — 71250 CT THORAX DX C-: CPT | Mod: 26,MA

## 2023-08-20 PROCEDURE — 99223 1ST HOSP IP/OBS HIGH 75: CPT

## 2023-08-20 PROCEDURE — 71045 X-RAY EXAM CHEST 1 VIEW: CPT | Mod: 26

## 2023-08-20 RX ORDER — CEFEPIME 1 G/1
1000 INJECTION, POWDER, FOR SOLUTION INTRAMUSCULAR; INTRAVENOUS ONCE
Refills: 0 | Status: COMPLETED | OUTPATIENT
Start: 2023-08-20 | End: 2023-08-20

## 2023-08-20 RX ORDER — ENOXAPARIN SODIUM 100 MG/ML
40 INJECTION SUBCUTANEOUS EVERY 24 HOURS
Refills: 0 | Status: DISCONTINUED | OUTPATIENT
Start: 2023-08-20 | End: 2023-08-24

## 2023-08-20 RX ORDER — MEMANTINE HYDROCHLORIDE 10 MG/1
1 TABLET ORAL
Refills: 0 | DISCHARGE

## 2023-08-20 RX ORDER — ACETAMINOPHEN 500 MG
650 TABLET ORAL EVERY 6 HOURS
Refills: 0 | Status: DISCONTINUED | OUTPATIENT
Start: 2023-08-20 | End: 2023-08-24

## 2023-08-20 RX ORDER — DONEPEZIL HYDROCHLORIDE 10 MG/1
10 TABLET, FILM COATED ORAL AT BEDTIME
Refills: 0 | Status: DISCONTINUED | OUTPATIENT
Start: 2023-08-20 | End: 2023-08-24

## 2023-08-20 RX ORDER — CEFEPIME 1 G/1
INJECTION, POWDER, FOR SOLUTION INTRAMUSCULAR; INTRAVENOUS
Refills: 0 | Status: DISCONTINUED | OUTPATIENT
Start: 2023-08-20 | End: 2023-08-20

## 2023-08-20 RX ORDER — CEFEPIME 1 G/1
INJECTION, POWDER, FOR SOLUTION INTRAMUSCULAR; INTRAVENOUS
Refills: 0 | Status: DISCONTINUED | OUTPATIENT
Start: 2023-08-20 | End: 2023-08-24

## 2023-08-20 RX ORDER — ATORVASTATIN CALCIUM 80 MG/1
1 TABLET, FILM COATED ORAL
Refills: 0 | DISCHARGE

## 2023-08-20 RX ORDER — SODIUM CHLORIDE 9 MG/ML
1000 INJECTION INTRAMUSCULAR; INTRAVENOUS; SUBCUTANEOUS
Refills: 0 | Status: DISCONTINUED | OUTPATIENT
Start: 2023-08-20 | End: 2023-08-23

## 2023-08-20 RX ORDER — CEFEPIME 1 G/1
1000 INJECTION, POWDER, FOR SOLUTION INTRAMUSCULAR; INTRAVENOUS EVERY 8 HOURS
Refills: 0 | Status: DISCONTINUED | OUTPATIENT
Start: 2023-08-20 | End: 2023-08-24

## 2023-08-20 RX ORDER — VANCOMYCIN HCL 1 G
1000 VIAL (EA) INTRAVENOUS ONCE
Refills: 0 | Status: COMPLETED | OUTPATIENT
Start: 2023-08-20 | End: 2023-08-20

## 2023-08-20 RX ORDER — BENAZEPRIL HYDROCHLORIDE 40 MG/1
1 TABLET ORAL
Refills: 0 | DISCHARGE

## 2023-08-20 RX ORDER — MEMANTINE HYDROCHLORIDE 10 MG/1
10 TABLET ORAL AT BEDTIME
Refills: 0 | Status: DISCONTINUED | OUTPATIENT
Start: 2023-08-20 | End: 2023-08-24

## 2023-08-20 RX ORDER — SODIUM CHLORIDE 9 MG/ML
2000 INJECTION INTRAMUSCULAR; INTRAVENOUS; SUBCUTANEOUS ONCE
Refills: 0 | Status: COMPLETED | OUTPATIENT
Start: 2023-08-20 | End: 2023-08-20

## 2023-08-20 RX ORDER — DONEPEZIL HYDROCHLORIDE 10 MG/1
1 TABLET, FILM COATED ORAL
Refills: 0 | DISCHARGE

## 2023-08-20 RX ORDER — ATORVASTATIN CALCIUM 80 MG/1
40 TABLET, FILM COATED ORAL AT BEDTIME
Refills: 0 | Status: DISCONTINUED | OUTPATIENT
Start: 2023-08-20 | End: 2023-08-24

## 2023-08-20 RX ORDER — PIPERACILLIN AND TAZOBACTAM 4; .5 G/20ML; G/20ML
3.38 INJECTION, POWDER, LYOPHILIZED, FOR SOLUTION INTRAVENOUS ONCE
Refills: 0 | Status: COMPLETED | OUTPATIENT
Start: 2023-08-20 | End: 2023-08-20

## 2023-08-20 RX ORDER — LISINOPRIL 2.5 MG/1
40 TABLET ORAL DAILY
Refills: 0 | Status: DISCONTINUED | OUTPATIENT
Start: 2023-08-20 | End: 2023-08-24

## 2023-08-20 RX ADMIN — SODIUM CHLORIDE 2000 MILLILITER(S): 9 INJECTION INTRAMUSCULAR; INTRAVENOUS; SUBCUTANEOUS at 12:37

## 2023-08-20 RX ADMIN — Medication 250 MILLIGRAM(S): at 12:05

## 2023-08-20 RX ADMIN — ATORVASTATIN CALCIUM 40 MILLIGRAM(S): 80 TABLET, FILM COATED ORAL at 22:39

## 2023-08-20 RX ADMIN — PIPERACILLIN AND TAZOBACTAM 200 GRAM(S): 4; .5 INJECTION, POWDER, LYOPHILIZED, FOR SOLUTION INTRAVENOUS at 10:55

## 2023-08-20 RX ADMIN — SODIUM CHLORIDE 75 MILLILITER(S): 9 INJECTION INTRAMUSCULAR; INTRAVENOUS; SUBCUTANEOUS at 16:02

## 2023-08-20 RX ADMIN — DONEPEZIL HYDROCHLORIDE 10 MILLIGRAM(S): 10 TABLET, FILM COATED ORAL at 22:39

## 2023-08-20 RX ADMIN — CEFEPIME 1000 MILLIGRAM(S): 1 INJECTION, POWDER, FOR SOLUTION INTRAMUSCULAR; INTRAVENOUS at 16:02

## 2023-08-20 RX ADMIN — CEFEPIME 1000 MILLIGRAM(S): 1 INJECTION, POWDER, FOR SOLUTION INTRAMUSCULAR; INTRAVENOUS at 22:40

## 2023-08-20 RX ADMIN — MEMANTINE HYDROCHLORIDE 10 MILLIGRAM(S): 10 TABLET ORAL at 22:39

## 2023-08-20 NOTE — H&P ADULT - NSHPLABSRESULTS_GEN_ALL_CORE
15.7   20.17 )-----------( 315      ( 20 Aug 2023 10:00 )             48.1   08-20    141  |  100  |  16.9  ----------------------------<  133<H>  4.2   |  26.0  |  1.30    Ca    9.2      20 Aug 2023 10:00    TPro  7.6  /  Alb  3.4  /  TBili  0.4  /  DBili  x   /  AST  22  /  ALT  21  /  AlkPhos  74  08-20

## 2023-08-20 NOTE — ED PROVIDER NOTE - ATTENDING CONTRIBUTION TO CARE
64yo nonverbal male w/ hx of dementia sent to the ED for vomiting and possible aspiration. His O2sat was in the low 80s on RA, low 90s on 6L NC.  pt note with rt leg with redness and warmth;   pe nonverbal heent ncat neck supple cor s1s2 lung clear abd soft nontender  skin  rt lower leg erythemia foot and anterior tib ;   dx cellultis  eval sepsis; tx with abx 64yo nonverbal male w/ hx of dementia sent to the ED for vomiting and possible aspiration. His O2sat was in the low 80s on RA, low 90s on 6L NC.  pt note with rt leg with redness and warmth;   pe nonverbal heent ncat neck supple cor s1s2 lung clear abd soft nontender  skin  rt lower leg erythemia foot and anterior tib ;   dx cellulitis  eval sepsis; tx with abx

## 2023-08-20 NOTE — ED ADULT NURSE REASSESSMENT NOTE - NS ED NURSE REASSESS COMMENT FT1
pt remains resting comfortable in stretcher, sleeping comfortable. medicated as per EMAR, IV remains intact, no s/s of infiltration noted. RR wnl, VSS as charted. Pt TBA. awaiting bed placement

## 2023-08-20 NOTE — ED PROVIDER NOTE - OBJECTIVE STATEMENT
63y nonverbal male w/ hx of dementia sent to the ED for vomiting and possible aspiration. His O2sat was in the low 80s on RA, low 90s on 6L NC. 62yo nonverbal male w/ hx of dementia sent to the ED for vomiting and possible aspiration. His O2sat was in the low 80s on RA, low 90s on 6L NC.

## 2023-08-20 NOTE — ED PROVIDER NOTE - NSICDXFAMILYHX_GEN_ALL_CORE_FT
FAMILY HISTORY:  No pertinent family history in first degree relatives    Father  Still living? Unknown  FH: dementia, Age at diagnosis: Age Unknown

## 2023-08-20 NOTE — PATIENT PROFILE ADULT - FUNCTIONAL ASSESSMENT - DAILY ACTIVITY 2.
ANTICOAGULATION MANAGEMENT     Rocío Catherine 27 year old female is on warfarin with subtherapeutic INR result. (Goal INR )    Recent labs: (last 7 days)     11/11/21  1622   INR 1.2*       ASSESSMENT     Source(s): Patient/Caregiver Call       Warfarin doses taken: Missed dose(s) may be affecting INR    Diet: No new diet changes identified    New illness, injury, or hospitalization: No    Medication/supplement changes: None noted    Signs or symptoms of bleeding or clotting: No    Previous INR: Therapeutic last visit; previously outside of goal range    Additional findings: Patient was out of town for the weekend and missed 2 doses of Warfarin. She did take an extra 5 mg on Tuesday.     PLAN     Recommended plan for temporary change(s) affecting INR     Dosing Instructions: Booster dose then continue your current warfarin dose with next INR in 1 week       Summary  As of 11/11/2021    Full warfarin instructions:  11/11: 15 mg; Otherwise 10 mg every Mon; 7.5 mg all other days   Next INR check:  11/18/2021             Telephone call with Rocío who verbalizes understanding and agrees to plan    Patient to recheck with home meter    Education provided: Please call back if any changes to your diet, medications or how you've been taking warfarin, Importance of taking warfarin as instructed, Monitoring for clotting signs and symptoms, When to seek medical attention/emergency care and Contact [phone]  with any changes, questions or concerns.     Plan made per [facility] anticoagulation protocol. Verified with CHINA Augustin. No need to bridge.    Tim Randall RN  Anticoagulation Clinic  11/11/2021    _______________________________________________________________________     Anticoagulation Episode Summary     Current INR goal:  2.0-3.0   TTR:  51.7 % (1 y)   Target end date:  Indefinite   Send INR reminders to:  JENNIFER LECHUGA    Indications    Acute pulmonary embolism  unspecified pulmonary embolism type  unspecified  whether acute cor pulmonale present (H) [I26.99]  Factor 5 Leiden mutation  heterozygous (H) [D68.51]  Other acute pulmonary embolism without acute cor pulmonale (H) (Resolved) [I26.99]  Acute pulmonary embolism  unspecified pulmonary embolism type  unspecified whether acute cor pulmonale present (H) (Resolved) [I26.99]  Antiphospholipid syndrome (H) [D68.61]  Long term current use of anticoagulant therapy [Z79.01]           Comments:  * Aces         Anticoagulation Care Providers     Provider Role Specialty Phone number    Jesse Farnsworth MD Referring Family Medicine [phone]    Yessy Lyn APRN CNP Referring Family Medicine [phone]           1 = Total assistance

## 2023-08-20 NOTE — ED PROVIDER NOTE - CLINICAL SUMMARY MEDICAL DECISION MAKING FREE TEXT BOX
63y nonverbal male w/ hx of dementia sent to the ED for vomiting and possible aspiration. Patient is in no acute distress, normal work of breathing, clear lungs, O2sat 95 on 4L NC, drops to 90 on RA.     CBC, CMP  UA  CXR  CT?? 63y nonverbal male w/ hx of dementia sent to the ED for vomiting and possible aspiration. Patient is in no acute distress, normal work of breathing, clear lungs, O2sat 95 on 4L NC, drops to 90 on RA.     CBC, CMP, UA, lactate, VBG  CXR, CT chest 63y nonverbal male w/ hx of dementia sent to the ED for vomiting and possible aspiration. Patient is in no acute distress, normal work of breathing, clear lungs, O2sat 95 on 4L NC, drops to 90 on RA. rectal temp found to be 102.    CBC, CMP, UA, lactate, VBG  CXR, CT chest    102 rectal temp - sepsis work up 63y nonverbal male w/ hx of dementia sent to the ED for vomiting and possible aspiration. Patient is in no acute distress, normal work of breathing, clear lungs, O2sat 95 on 4L NC, drops to 90 on RA. rectal temp found to be 102. RLE concerning for cellulitis, erythema and warmth from the foot to below the knee. Per patients brother he has a history of skin infections.     CBC, CMP, UA, UC, lactate, VBG, BCx2  CXR, CT chest    102 rectal temp - sepsis work up 63y nonverbal male w/ hx of dementia sent to the ED for vomiting and possible aspiration. Patient in no acute distress, normal work of breathing, clear lungs, O2sat in mid-low 90s on RA. rectal temp found to be 102F. RLE concerning for cellulitis, erythema and warmth from the foot to below the knee. Per patients brother he has a history of skin infections. White count 20, lactate 4.2. CT chest shows left mid to lower lung opacities suggestive of atelectasis possibly with superimposed pneumonia given the above history. Patient was started on vancomycin and zosyn, given 2L of NS. Medicine team has accepted the patient for inpatient ABX to treat cellulitis and PNA.

## 2023-08-20 NOTE — ED ADULT NURSE NOTE - OBJECTIVE STATEMENT
pt reports to the ED from home with c/o of vomiting and possible aspiration. Pt in ED has normal work of breathing. Pt from EMS was on 6L, pt non complaint keeping NC in nares, 02 stat on RA 95%. pt nonverbal and hx of dementia so unable to tell RN of any pain or discomfort. Pt noted with R leg redness and warmth, tachy on monitor, rectal temp 102. MD made aware. Brother at bedside sates he has hx of skin infections.

## 2023-08-20 NOTE — H&P ADULT - NSHPPHYSICALEXAM_GEN_ALL_CORE
Vital Signs Last 24 Hrs  T(F): 99 (20 Aug 2023 12:44), Max: 99 (20 Aug 2023 12:44)  HR: 102 (20 Aug 2023 12:44) (102 - 112)  BP: 124/78 (20 Aug 2023 12:44) (124/78 - 133/80)  RR: 18 (20 Aug 2023 12:44) (18 - 20)  SpO2: 93% (20 Aug 2023 12:44) (92% - 93%)    Physical Exam:  Constitutional: alert, in no acute distress   Neck: Soft   Respiratory: Clear to auscultation bilaterally, no wheezes or crackles  Cardiovascular: Regular rate and rhythm  Gastrointestinal: Soft  Vascular: 2+ peripheral pulses  Neurological: A/O x 3, no focal neurological deficits  Musculoskeletal: 5/5 strength b/l upper and lower extremities, no lower extremity edema bilaterally  Skin: right lower extremity warmth and erythema

## 2023-08-20 NOTE — PATIENT PROFILE ADULT - NSPROPTRIGHTBILLOFRIGHTS_GEN_A_NUR
Behavioral Health Psychotherapy Progress Note    Psychotherapy Provided: Individual Psychotherapy     1  Attention deficit hyperactivity disorder (ADHD), combined type            Goals addressed in session: Goal 1     DATA: Therapist worked with Keara Arredondo by engaging in pretend play where the two of them were 'spies' who had to stop some bad guys  Keara Arredondo was very distracted overall and struggled with his focus throughout the session  Keara Arredondo would often go on different tangents, therapist was able to engage in them and then redirect back while bringing awareness to Keara Arredondo to keep track of what was important for him  In play it is okay to lose focus versus when he needs to complete a task  Maksim needed prompts one time on being able to manage listening, he took out a bag from a filing cabinet that did not belong to therapist and initially refused to put it back  He eventually was able to do so but only after therapist prompted him through how his actions were coming across versus how he means to be  She also processed with him understanding that he wanted to look at it but being respectful of boundaries  Keara Arredondo did very well with this  He also did well bringing up topics of interest and problem solving together what they could do in play  During this session, this clinician used the following therapeutic modalities: Cognitive Behavioral Therapy    Substance Abuse was not addressed during this session  If the client is diagnosed with a co-occurring substance use disorder, please indicate any changes in the frequency or amount of use: na  Stage of change for addressing substance use diagnoses: No substance use/Not applicable    ASSESSMENT:  Tyson Colon presents with a Euthymic/ normal mood  his affect is Normal range and intensity, which is congruent, with his mood and the content of the session  The client has not made progress on their goals     Tyson Colon presents with a none risk of suicide, none risk of self-harm, and none risk of harm to others  For any risk assessment that surpasses a "low" rating, a safety plan must be developed  A safety plan was indicated: no  If yes, describe in detail na    PLAN: Between sessions, Jenni Esteves will practice use of coping strategies for impulsive behaviors  At the next session, the therapist will use Cognitive Behavioral Therapy to address his ability to manage focus  Behavioral Health Treatment Plan and Discharge Planning: Jenni Esteves is aware of and agrees to continue to work on their treatment plan  They have identified and are working toward their discharge goals   yes    Visit start and stop times:    03/08/23  Start Time: 0900  Stop Time: 0945  Total Visit Time: 45 minutes patient

## 2023-08-20 NOTE — ED PROVIDER NOTE - PHYSICAL EXAMINATION
General: Awake, alert, lying in bed in NAD, non verbal  HEENT: Normocephalic, atraumatic. No scleral icterus or conjunctival injection. EOMI.  Neck:. Soft and supple.  Cardiac: RRR, Peripheral pulses 2+ and symmetric. No LE edema.  Resp: Lungs CTAB. No accessory muscle use  Abd: Soft, non-tender, non-distended. No guarding, rebound, or rigidity.  Back: Spine midline and non-tender.   Skin: No rashes, abrasions, or lacerations.  Neuro: Moves all extremities symmetrically. Motor strength and sensation grossly intact.  Psych: non verbal, laying calmly General: Awake, alert, lying in bed in NAD, non verbal  HEENT: Normocephalic, atraumatic. No scleral icterus or conjunctival injection. EOMI.  Neck:. Soft and supple.  Cardiac: RRR, Peripheral pulses 2+ and symmetric. No LE edema.  Resp: Lungs CTAB. No accessory muscle use  Abd: Soft, non-tender, non-distended. No guarding, rebound, or rigidity.  Back: Spine midline and non-tender.   Skin: erythema and warmth from right foot to below the knee  Neuro: Moves all extremities symmetrically. Motor strength and sensation grossly intact.  Psych: non verbal, laying calmly General: Awake, alert, lying in bed in NAD, non verbal  HEENT: Normocephalic, atraumatic. No scleral icterus or conjunctival injection. EOMI.  Neck:. Soft and supple.  Cardiac: RRR, Peripheral pulses 2+ and symmetric. No LE edema.  Resp: Lungs CTAB. No accessory muscle use  Abd: Soft, non-tender, non-distended. No guarding, rebound, or rigidity.  Back: Spine midline and non-tender.   Skin: erythema and warmth from right foot to below the knee. erythema and skin breakdown in bilateral buttocks extending to gluteal cleft.   Neuro: Moves all extremities  Psych: non verbal, laying calmly

## 2023-08-20 NOTE — H&P ADULT - ASSESSMENT
62 y/o nonverbal M with PMH of vascular dementia presents with vomiting and possible aspiration. On arrival to the ED the patient was found to have a fever of 102F.     Sepsis secondary to cellulitis and aspiration pneumonia  - Start Cefepime   - Lactate 4.20  - CXR with left lower lobe infiltrate  - CT chest: Left mid to lower lung opacities as described above suggestive of atelectasis possibly with superimposed pneumonia given the above history. Small cluster of nodular and linear opacities within the left upper lobe likely sequela of impacted distal airways may also be of infectious etiology.  - Follow blood cultures in process    HTN  - Benazepril 40mg daily    HLD  - Lipitor 40mg nightly    Vascular Dementia  - Donepezil 10mg daily  - Memantine 10mg daily    DVT ppx  - Lovenox SQ

## 2023-08-20 NOTE — PATIENT PROFILE ADULT - FALL HARM RISK - HARM RISK INTERVENTIONS

## 2023-08-20 NOTE — ED ADULT TRIAGE NOTE - CHIEF COMPLAINT QUOTE
Sent in from home for vomiting. Possible aspiration. O2 sat low in the 80s on RA. Placed on 6L NC SPo2 in low 90s. PT is non verbal with dementia at baseline.

## 2023-08-20 NOTE — PATIENT PROFILE ADULT - NSPROPTRIGHTNOTIFYOBTAINDET_GEN_A_NUR
Department of Anesthesiology  Preprocedure Note       Name:  Cait Austin   Age:  81 y.o.  :  1933                                          MRN:  [id number]         Date:  2018      Surgeon: Rosmery Rahman):  Mahnaz Horan MD    Procedure: Procedure(s):  RIGHT HIP TOTAL ARTHROPLASTY    Medications prior to admission:   Prior to Admission medications    Medication Sig Start Date End Date Taking? Authorizing Provider   rivaroxaban (XARELTO) 10 MG TABS tablet Take 1 tablet by mouth every 24 hours 18  Yes Sherin Bui CNP   traMADol (ULTRAM) 50 MG tablet 1-2 tablets PO q 6° prn pain. 18 Yes Sherin Bui CNP   DILANTIN 100 MG ER capsule Alternate taking two of the pills on one day and then three of the pills on the next. Patient taking differently: Take one pill in the morning and one pill at night one day then 2 pills in the morning and 1 at night the next day, alternate days 17   Lebron Zafar MD   Cholecalciferol (VITAMIN D PO) Take 2,000 mg by mouth daily.     Historical Provider, MD       Current medications:    Current Facility-Administered Medications   Medication Dose Route Frequency Provider Last Rate Last Dose    morphine (PF) injection 4 mg  4 mg Intravenous Q3H PRN Mahnaz Horan MD        0.9 % sodium chloride infusion   Intravenous Continuous Mahnaz Horan MD 75 mL/hr at 18 2302      sodium chloride flush 0.9 % injection 10 mL  10 mL Intravenous 2 times per day Mahnaz Horan MD        sodium chloride flush 0.9 % injection 10 mL  10 mL Intravenous PRN Mahnaz Horan MD        acetaminophen (TYLENOL) tablet 650 mg  650 mg Oral Q4H PRN Mahnaz Horan MD        ondansetron [facility]) injection 4 mg  4 mg Intravenous Q6H PRN Mahnaz Horan MD        traMADol Hominy Sara) tablet 50 mg  50 mg Oral Q6H PRN Mahnaz Horan MD   50 mg at 18 1733    Or    traMADol (ULTRAM) tablet 100 mg  100 mg Oral Q6H PRN Mahnaz Horan MD   100 mg at GI/Hepatic/Renal: Neg GI/Hepatic/Renal ROS            Endo/Other: Negative Endo/Other ROS                    Abdominal:           Vascular:                                        Anesthesia Plan      general     ASA 2       Induction: intravenous. MIPS: Prophylactic antiemetics administered. Anesthetic plan and risks discussed with patient. Plan discussed with CRNA.                   Marlon Macias MD   2/14/2018 dennys pt non verbal and without apparent capacity

## 2023-08-20 NOTE — ED ADULT NURSE NOTE - NSFALLRISKINTERV_ED_ALL_ED
Assistance OOB with selected safe patient handling equipment if applicable/Assistance with ambulation/Communicate fall risk and risk factors to all staff, patient, and family/Monitor gait and stability/Monitor for mental status changes and reorient to person, place, and time, as needed/Provide patient with walking aids/Provide visual cue: yellow wristband, yellow gown, etc/Reinforce activity limits and safety measures with patient and family/Toileting schedule using arm’s reach rule for commode and bathroom/Use of alarms - bed, stretcher, chair and/or video monitoring/Call bell, personal items and telephone in reach/Instruct patient to call for assistance before getting out of bed/chair/stretcher/Non-slip footwear applied when patient is off stretcher/[place] to call system/Physically safe environment - no spills, clutter or unnecessary equipment/Purposeful Proactive Rounding/Room/bathroom lighting operational, light cord in reach

## 2023-08-20 NOTE — H&P ADULT - HISTORY OF PRESENT ILLNESS
64 y/o nonverbal M with PMH of vascular dementia presents with vomiting and possible aspiration. On arrival to the ED the patient was found to have a fever of 102F.

## 2023-08-20 NOTE — ED PROVIDER NOTE - NSICDXPASTSURGICALHX_GEN_ALL_CORE_FT
PAST SURGICAL HISTORY:  H/O sinus surgery     No significant past surgical history     S/P orchiectomy

## 2023-08-20 NOTE — PATIENT PROFILE ADULT - FALL HARM RISK - ATTEMPT OOB
The patient location is: home  The chief complaint leading to consultation is: fatty lvier    Visit type: audiovisual    Face to Face time with patient: 15 minutes  30 minutes of total time spent on the encounter, which includes face to face time and non-face to face time preparing to see the patient (eg, review of tests), Obtaining and/or reviewing separately obtained history, Documenting clinical information in the electronic or other health record, Independently interpreting results (not separately reported) and communicating results to the patient/family/caregiver, or Care coordination (not separately reported).         Each patient to whom he or she provides medical services by telemedicine is:  (1) informed of the relationship between the physician and patient and the respective role of any other health care provider with respect to management of the patient; and (2) notified that he or she may decline to receive medical services by telemedicine and may withdraw from such care at any time.    Notes:   HEPATOLOGY FOLLOW UP    Referring Physician: Karina Chen MD   Current Corresponding Physician: Karina Chen MD     Reinaldo Islas is here for follow up of Fatty Liver      HPI   Reinaldo Islas is a 68 y.o. female with an elevated BMI, HTN, anemia, GERD, asthma, arthritis, back pain, IC, hiatal hernia, IBS, diverticulosis, colon polyp vitamin D deficiency, recurrent UTIs, and Chiari syndrome, who presented on 10/7/22 for evaluation of abnormal liver imaging:     CT abdo pelvis wo contrast 10/12/21 done for abdo pain: The liver, spleen, pancreas, and adrenal glands are unremarkable  Abdo US 3/16/22: The LIVER is enlarged in size at 20.5 cm (sagittal right lobe). Hepatic parenchyma has increased echogenicity with poor delineation of the periportal triads (c/w hepatic steatosis). No definite intrahepatic masses are noted. The portal vein is patent with hepatopetal flow.  CT abdo pelvis wo  contrast 9/11/22: No acute abnormality of the abdomen or pelvis.     Labs 9/11/22: ALT 13, AST 10, ALKP 86, Tbil 0.3  BMI: 40  Alcohol: none     The patient denies any symptoms of decompensated cirrhosis, including no ascites or edema, cognitive problems that would suggest hepatic encephalopathy, or GI bleeding from varices.     Interval History  Since Reinaldo Islas's last visit:    MRE 10/28/22: borderline for fatty liver; no significant fibrosis    Labs 10/7/22: HBsAb+, HBcAb+, HBsAb+, HAV IgG negative, HCV Ab-    Outpatient Encounter Medications as of 11/18/2022   Medication Sig Dispense Refill    albuterol (PROVENTIL HFA) 90 mcg/actuation inhaler Inhale 2 puffs into the lungs every 6 (six) hours as needed for Wheezing or Shortness of Breath. 18 g 5    albuterol (PROVENTIL) 2.5 mg /3 mL (0.083 %) nebulizer solution Take 3 mLs (2.5 mg total) by nebulization every 6 (six) hours as needed for Wheezing or Shortness of Breath. Rescue 50 each 6    albuterol (PROVENTIL/VENTOLIN HFA) 90 mcg/actuation inhaler Inhale 2 puffs into the lungs every 6 (six) hours as needed for Wheezing. Rescue 18 g 0    albuterol-ipratropium (DUO-NEB) 2.5 mg-0.5 mg/3 mL nebulizer solution Take 3 mLs by nebulization every 6 (six) hours as needed for Wheezing. Rescue 75 mL 0    amLODIPine (NORVASC) 10 MG tablet TAKE 1 TABLET(10 MG) BY MOUTH EVERY DAY 90 tablet 0    blood sugar diagnostic (TRUETEST TEST STRIPS) Strp Use to measure BG once daily. 100 strip 5    blood-glucose meter (TRUE METRIX GLUCOSE METER) Misc 1 each by Misc.(Non-Drug; Combo Route) route once daily. 1 each 0    celecoxib (CELEBREX) 100 MG capsule Take 1 capsule (100 mg total) by mouth 2 (two) times a day. 60 capsule 5    cetirizine (ZYRTEC) 10 MG tablet TAKE 1 TABLET BY MOUTH DAILY 90 tablet 3    ciprofloxacin HCl (CIPRO) 250 MG tablet Take 1 tablet (250 mg total) by mouth 2 (two) times daily. for 3 days 6 tablet 0    dicyclomine (BENTYL) 20 mg tablet Take 2 tablets (40 mg  total) by mouth 2 (two) times daily. 360 tablet 3    esomeprazole (NEXIUM) 20 MG capsule Take 1 capsule (20 mg total) by mouth 2 (two) times daily. 180 capsule 3    fluconazole (DIFLUCAN) 150 MG Tab Take 1 tablet (150 mg total) by mouth once daily. for 2 days 2 tablet 0    fluticasone propionate (FLONASE) 50 mcg/actuation nasal spray SHAKE LIQUID AND USE 2 SPRAYS IN EACH NOSTRIL EVERY DAY 48 g 1    fluticasone propionate (FLOVENT HFA) 110 mcg/actuation inhaler Inhale 1 puff into the lungs 2 (two) times daily. Controller 12 g 5    ibuprofen (ADVIL,MOTRIN) 800 MG tablet TAKE 1 TABLET BY MOUTH UP TO THREE TIMES DAILY AS NEEDED FOR MODERATE TO SEVERE PAIN 30 tablet 2    Lactobacillus rhamnosus GG (CULTURELLE) 10 billion cell capsule Take 1 capsule by mouth once daily.      levocetirizine (XYZAL) 5 MG tablet TAKE 1 TABLET(5 MG) BY MOUTH EVERY EVENING 90 tablet 1    LORazepam (ATIVAN) 1 MG tablet Take 1 tablet (1 mg total) by mouth On call Procedure for Anxiety (for claustrophobia-take one tablet 60 min before the mri and if needed take a second tablet 30 min before the ADARSH). 2 tablet 0    losartan (COZAAR) 100 MG tablet Take 1 tablet (100 mg total) by mouth once daily. 90 tablet 0    mirabegron (MYRBETRIQ) 50 mg Tb24 Take 1 tablet (50 mg total) by mouth once daily. 90 tablet 3    MULTIVITAMIN ORAL Take 1 tablet by mouth once daily.       naloxone (NARCAN) 4 mg/actuation Spry       ondansetron (ZOFRAN-ODT) 4 MG TbDL DISSOLVE 1 TABLET(4 MG) ON THE TONGUE EVERY 8 HOURS AS NEEDED FOR NAUSEA 15 tablet 2    oxyCODONE-acetaminophen (PERCOCET)  mg per tablet Take 1 tablet by mouth every 8 (eight) hours.       pentosan polysulfate (ELMIRON) 100 mg Cap Take 1 capsule (100 mg total) by mouth 2 (two) times a day. 180 capsule 3    promethazine (PHENERGAN) 12.5 MG Tab Take 1 tablet (12.5 mg total) by mouth every 6 (six) hours as needed (nausea). 30 tablet 1    spironolactone (ALDACTONE) 25 MG tablet TAKE 1 TABLET(25 MG) BY MOUTH  TWICE DAILY 60 tablet 1    sucralfate (CARAFATE) 1 gram tablet TAKE 1 TABLET(1 GRAM) BY MOUTH BEFORE MEALS AS NEEDED FOR ABDOMINAL PAIN 270 tablet 1    traMADoL (ULTRAM) 50 mg tablet TAKE 1 TABLET BY MOUTH EVERY 12 TO 24 HOURS AS NEEDED FOR BREAKTHROUGH PAIN FOR 30 DAYS      TRUEPLUS LANCETS 33 gauge Misc TEST ONCE DAILY. 100 each 3    [DISCONTINUED] 0.9%  NaCl infusion       [DISCONTINUED] LIDOcaine (cardiac) injection       [DISCONTINUED] propofol (DIPRIVAN) 10 mg/mL infusion        No facility-administered encounter medications on file as of 11/18/2022.     Review of patient's allergies indicates:   Allergen Reactions    Betadine [povidone-iodine] Rash    Iodine Hives    Penicillin g Itching     Past Medical History:   Diagnosis Date    Abnormal finding on imaging of liver 10/07/2022    Allergy     Anemia     Arthritis     osteoarthritis    Asthma     seasonal induced.  Last summer 2012    Back pain     Cataract     Chiari syndrome     Colon polyp     Diabetes mellitus     Diverticulosis     GERD (gastroesophageal reflux disease)     Hiatal hernia     Hypertension     IC (interstitial cystitis)     Interstitial cystitis     Irritable bowel syndrome     MRSA infection     Recurrent UTI 03/12/2019    Vitamin D deficiency     Wears partial dentures     front top center, gold       Review of Systems   Constitutional: Negative.    HENT: Negative.     Eyes: Negative.    Respiratory: Negative.     Cardiovascular: Negative.    Gastrointestinal: Negative.    Genitourinary: Negative.    Musculoskeletal: Negative.    Skin: Negative.    Neurological: Negative.    Psychiatric/Behavioral: Negative.     There were no vitals filed for this visit.    Physical Exam    MELD-Na score: 6 at 10/7/2022  9:08 AM  MELD score: 6 at 10/7/2022  9:08 AM  Calculated from:  Serum Creatinine: 0.7 mg/dL (Using min of 1 mg/dL) at 10/7/2022  9:08 AM  Serum Sodium: 139 mmol/L (Using max of 137 mmol/L) at 10/7/2022  9:08 AM  Total Bilirubin: 0.2  mg/dL (Using min of 1 mg/dL) at 10/7/2022  9:08 AM  INR(ratio): 1.0 at 10/7/2022  9:08 AM  Age: 68 years    Lab Results   Component Value Date    GLU 87 11/16/2022    GLU 99 04/23/2019    BUN 10 11/16/2022    CREATININE 0.7 11/16/2022    CREATININE 0.56 (L) 04/23/2019    CREATININE 0.6 03/15/2012    CALCIUM 9.8 11/16/2022    CALCIUM 8.8 03/15/2012     11/16/2022     04/23/2019    K 3.6 11/16/2022     11/16/2022    CL 98 04/23/2019    PROT 8.0 10/07/2022    CO2 26 11/16/2022    ANIONGAP 10 11/16/2022    ANIONGAP 8 03/15/2012    WBC 10.36 10/07/2022    RBC 4.74 10/07/2022    HGB 13.4 10/07/2022    HGB 11.9 (L) 03/15/2012    HCT 43.4 10/07/2022    HCT 38 09/16/2019    MCV 92 10/07/2022    MCH 28.3 10/07/2022    MCHC 30.9 (L) 10/07/2022     Lab Results   Component Value Date    RDW 14.8 (H) 10/07/2022     10/07/2022    MPV 10.7 10/07/2022    GRAN 8.3 (H) 10/07/2022    GRAN 80.0 (H) 10/07/2022    LYMPH 1.2 10/07/2022    LYMPH 12.0 (L) 10/07/2022    MONO 0.8 10/07/2022    MONO 7.5 10/07/2022    EOSINOPHIL 0.0 10/07/2022    BASOPHIL 0.1 10/07/2022    EOS 0.0 10/07/2022    BASO 0.01 10/07/2022    APTT 32.4 (H) 05/29/2015    BNP 33 09/15/2019    CHOL 181 04/26/2022    TRIG 41 04/26/2022    HDL 78 (H) 04/26/2022    CHOLHDL 43.1 04/26/2022    TOTALCHOLEST 2.3 04/26/2022    ALBUMIN 3.7 10/07/2022    ALBUMIN 3.7 04/23/2019    AST 11 10/07/2022    AST 11 03/15/2012    ALT 12 10/07/2022    ALKPHOS 91 10/07/2022    ALKPHOS 105 03/15/2012    MG 2.2 09/23/2019    LABPROT 10.6 10/07/2022    INR 1.0 10/07/2022       Assessment and Plan:    Reinaldo Islas is a 68 y.o. female with fatty liver. MRE reveals no significant fibrosis. Imaging shows a subcn hepatic cyst which requires no follow up. She should complete vaccination for hepatitis A. She has no hepatitis C. HBcAb is noted to be positive. For this she will need antiviral prophylaxis if she gets treated with immunosuppression.    I have recommended that  she eat foods low in fat and target a BMI of 20-25. Avoid alcohol. Return 1 year abdo US and labs.   No

## 2023-08-21 LAB
ANION GAP SERPL CALC-SCNC: 10 MMOL/L — SIGNIFICANT CHANGE UP (ref 5–17)
BASOPHILS # BLD AUTO: 0.06 K/UL — SIGNIFICANT CHANGE UP (ref 0–0.2)
BASOPHILS NFR BLD AUTO: 0.5 % — SIGNIFICANT CHANGE UP (ref 0–2)
BUN SERPL-MCNC: 15.7 MG/DL — SIGNIFICANT CHANGE UP (ref 8–20)
CALCIUM SERPL-MCNC: 8.2 MG/DL — LOW (ref 8.4–10.5)
CHLORIDE SERPL-SCNC: 109 MMOL/L — HIGH (ref 96–108)
CO2 SERPL-SCNC: 23 MMOL/L — SIGNIFICANT CHANGE UP (ref 22–29)
CREAT SERPL-MCNC: 0.99 MG/DL — SIGNIFICANT CHANGE UP (ref 0.5–1.3)
CULTURE RESULTS: SIGNIFICANT CHANGE UP
EGFR: 86 ML/MIN/1.73M2 — SIGNIFICANT CHANGE UP
EOSINOPHIL # BLD AUTO: 1.33 K/UL — HIGH (ref 0–0.5)
EOSINOPHIL NFR BLD AUTO: 11.7 % — HIGH (ref 0–6)
GLUCOSE SERPL-MCNC: 101 MG/DL — HIGH (ref 70–99)
HCT VFR BLD CALC: 40.3 % — SIGNIFICANT CHANGE UP (ref 39–50)
HGB BLD-MCNC: 13.2 G/DL — SIGNIFICANT CHANGE UP (ref 13–17)
IMM GRANULOCYTES NFR BLD AUTO: 0.4 % — SIGNIFICANT CHANGE UP (ref 0–0.9)
LACTATE SERPL-SCNC: 0.8 MMOL/L — SIGNIFICANT CHANGE UP (ref 0.5–2)
LYMPHOCYTES # BLD AUTO: 1.64 K/UL — SIGNIFICANT CHANGE UP (ref 1–3.3)
LYMPHOCYTES # BLD AUTO: 14.4 % — SIGNIFICANT CHANGE UP (ref 13–44)
MCHC RBC-ENTMCNC: 29.9 PG — SIGNIFICANT CHANGE UP (ref 27–34)
MCHC RBC-ENTMCNC: 32.8 GM/DL — SIGNIFICANT CHANGE UP (ref 32–36)
MCV RBC AUTO: 91.2 FL — SIGNIFICANT CHANGE UP (ref 80–100)
MONOCYTES # BLD AUTO: 0.89 K/UL — SIGNIFICANT CHANGE UP (ref 0–0.9)
MONOCYTES NFR BLD AUTO: 7.8 % — SIGNIFICANT CHANGE UP (ref 2–14)
NEUTROPHILS # BLD AUTO: 7.41 K/UL — HIGH (ref 1.8–7.4)
NEUTROPHILS NFR BLD AUTO: 65.2 % — SIGNIFICANT CHANGE UP (ref 43–77)
PLATELET # BLD AUTO: 210 K/UL — SIGNIFICANT CHANGE UP (ref 150–400)
POTASSIUM SERPL-MCNC: 3.5 MMOL/L — SIGNIFICANT CHANGE UP (ref 3.5–5.3)
POTASSIUM SERPL-SCNC: 3.5 MMOL/L — SIGNIFICANT CHANGE UP (ref 3.5–5.3)
RBC # BLD: 4.42 M/UL — SIGNIFICANT CHANGE UP (ref 4.2–5.8)
RBC # FLD: 12.2 % — SIGNIFICANT CHANGE UP (ref 10.3–14.5)
SODIUM SERPL-SCNC: 142 MMOL/L — SIGNIFICANT CHANGE UP (ref 135–145)
SPECIMEN SOURCE: SIGNIFICANT CHANGE UP
WBC # BLD: 11.37 K/UL — HIGH (ref 3.8–10.5)
WBC # FLD AUTO: 11.37 K/UL — HIGH (ref 3.8–10.5)

## 2023-08-21 PROCEDURE — 99233 SBSQ HOSP IP/OBS HIGH 50: CPT

## 2023-08-21 RX ADMIN — LISINOPRIL 40 MILLIGRAM(S): 2.5 TABLET ORAL at 05:33

## 2023-08-21 RX ADMIN — ATORVASTATIN CALCIUM 40 MILLIGRAM(S): 80 TABLET, FILM COATED ORAL at 22:42

## 2023-08-21 RX ADMIN — ENOXAPARIN SODIUM 40 MILLIGRAM(S): 100 INJECTION SUBCUTANEOUS at 13:36

## 2023-08-21 RX ADMIN — CEFEPIME 1000 MILLIGRAM(S): 1 INJECTION, POWDER, FOR SOLUTION INTRAMUSCULAR; INTRAVENOUS at 22:42

## 2023-08-21 RX ADMIN — CEFEPIME 1000 MILLIGRAM(S): 1 INJECTION, POWDER, FOR SOLUTION INTRAMUSCULAR; INTRAVENOUS at 05:33

## 2023-08-21 RX ADMIN — CEFEPIME 1000 MILLIGRAM(S): 1 INJECTION, POWDER, FOR SOLUTION INTRAMUSCULAR; INTRAVENOUS at 13:32

## 2023-08-21 NOTE — SWALLOW BEDSIDE ASSESSMENT ADULT - SWALLOW EVAL: RECOMMENDED FEEDING/EATING TECHNIQUES
check mouth frequently for oral residue/pocketing/crush medication (when feasible)/maintain upright posture during/after eating for 30 mins/position upright (90 degrees)/small sips/bites

## 2023-08-21 NOTE — SWALLOW BEDSIDE ASSESSMENT ADULT - SLP GENERAL OBSERVATIONS
Pt recd A&A in bed, reduced cognition, nonverbal, unable to follow commands, tolerating room air, intermittent  baseline cough, nonverbal pain 0/10 pre/post eval

## 2023-08-21 NOTE — SWALLOW BEDSIDE ASSESSMENT ADULT - SLP PERTINENT HISTORY OF CURRENT PROBLEM
Pt known to this service, last seen June, 2022 with a rx for regular/thin liquid diet. 62 y/o nonverbal M with PMH of vascular dementia presents with vomiting and possible aspiration. On arrival to the ED the patient was found to have a fever of 102F. CT chest: Left mid to lower lung opacities as described above suggestive of atelectasis possibly with superimposed pneumonia given the above history. Small cluster of nodular and linear opacities within the left upper lobe likely sequela of impacted distal airways may also be of infectious etiology.

## 2023-08-21 NOTE — PROGRESS NOTE ADULT - ASSESSMENT
64 y/o nonverbal M with PMH of vascular dementia presents with vomiting and possible aspiration. On arrival to the ED the patient was found to have a fever of 102F.     #Sepsis likely 2/2 Aspiration PAN  #cellulitis  Improving  - c/w cefepime  - UCx neg  - Lactate normalized  - CXR with left lower lobe infiltrate  - Follow blood cultures in process  - f/u BCx    #HTN  - Benazepril 40mg daily    #HLD  - Lipitor 40mg nightly    #Vascular Dementia  - Donepezil 10mg daily  - Memantine 10mg daily    #Dysphagia  - thickening of lower esphageal wall w/ trace fluid collection on CT  - obtain Esophagram  - passed Puree w/ Thins    #Healthcare Maintenance  DVT ppx - Lovenox SQ 64 y/o nonverbal M with PMH of vascular dementia presents with vomiting and possible aspiration. On arrival to the ED the patient was found to have a fever of 102F.     #Sepsis likely 2/2 Aspiration PAN  #cellulitis  Improving  - c/w cefepime  - UCx neg  - Lactate normalized  - CXR with left lower lobe infiltrate  - Follow blood cultures in process  - f/u BCx    #HTN  - Benazepril 40mg daily    #HLD  - Lipitor 40mg nightly    #Vascular Dementia  - Donepezil 10mg daily  - Memantine 10mg daily    #Dysphagia  - thickening of lower esphageal wall w/ trace fluid collection on CT  - passed  S/Sw for Puree w/ Thins    #Healthcare Maintenance  DVT ppx - Lovenox SQ

## 2023-08-21 NOTE — SWALLOW BEDSIDE ASSESSMENT ADULT - SWALLOW EVAL: DIAGNOSIS
Oral and pharyngeal stages of swallow appear functional for pureed solids and thin liquids. At least mild oral dysphagia, confounded by cognitive impairment, resulting in reduced attention to bolus and overall mastication, improved with purees at this time.

## 2023-08-21 NOTE — SWALLOW BEDSIDE ASSESSMENT ADULT - SLP PRECAUTIONS/LIMITATIONS: VISION
SHIRA Tazorac Counseling:  Patient advised that medication is irritating and drying.  Patient may need to apply sparingly and wash off after an hour before eventually leaving it on overnight.  The patient verbalized understanding of the proper use and possible adverse effects of tazorac.  All of the patient's questions and concerns were addressed.

## 2023-08-22 LAB
ANION GAP SERPL CALC-SCNC: 12 MMOL/L — SIGNIFICANT CHANGE UP (ref 5–17)
BUN SERPL-MCNC: 10.4 MG/DL — SIGNIFICANT CHANGE UP (ref 8–20)
CALCIUM SERPL-MCNC: 8.6 MG/DL — SIGNIFICANT CHANGE UP (ref 8.4–10.5)
CHLORIDE SERPL-SCNC: 106 MMOL/L — SIGNIFICANT CHANGE UP (ref 96–108)
CO2 SERPL-SCNC: 23 MMOL/L — SIGNIFICANT CHANGE UP (ref 22–29)
CREAT SERPL-MCNC: 0.89 MG/DL — SIGNIFICANT CHANGE UP (ref 0.5–1.3)
EGFR: 97 ML/MIN/1.73M2 — SIGNIFICANT CHANGE UP
GLUCOSE SERPL-MCNC: 95 MG/DL — SIGNIFICANT CHANGE UP (ref 70–99)
HCT VFR BLD CALC: 41.3 % — SIGNIFICANT CHANGE UP (ref 39–50)
HGB BLD-MCNC: 13.5 G/DL — SIGNIFICANT CHANGE UP (ref 13–17)
MCHC RBC-ENTMCNC: 29.3 PG — SIGNIFICANT CHANGE UP (ref 27–34)
MCHC RBC-ENTMCNC: 32.7 GM/DL — SIGNIFICANT CHANGE UP (ref 32–36)
MCV RBC AUTO: 89.8 FL — SIGNIFICANT CHANGE UP (ref 80–100)
PLATELET # BLD AUTO: 249 K/UL — SIGNIFICANT CHANGE UP (ref 150–400)
POTASSIUM SERPL-MCNC: 4 MMOL/L — SIGNIFICANT CHANGE UP (ref 3.5–5.3)
POTASSIUM SERPL-SCNC: 4 MMOL/L — SIGNIFICANT CHANGE UP (ref 3.5–5.3)
RBC # BLD: 4.6 M/UL — SIGNIFICANT CHANGE UP (ref 4.2–5.8)
RBC # FLD: 12.2 % — SIGNIFICANT CHANGE UP (ref 10.3–14.5)
SODIUM SERPL-SCNC: 141 MMOL/L — SIGNIFICANT CHANGE UP (ref 135–145)
WBC # BLD: 11.42 K/UL — HIGH (ref 3.8–10.5)
WBC # FLD AUTO: 11.42 K/UL — HIGH (ref 3.8–10.5)

## 2023-08-22 PROCEDURE — 99232 SBSQ HOSP IP/OBS MODERATE 35: CPT

## 2023-08-22 RX ADMIN — ENOXAPARIN SODIUM 40 MILLIGRAM(S): 100 INJECTION SUBCUTANEOUS at 14:07

## 2023-08-22 RX ADMIN — DONEPEZIL HYDROCHLORIDE 10 MILLIGRAM(S): 10 TABLET, FILM COATED ORAL at 23:23

## 2023-08-22 RX ADMIN — CEFEPIME 1000 MILLIGRAM(S): 1 INJECTION, POWDER, FOR SOLUTION INTRAMUSCULAR; INTRAVENOUS at 14:05

## 2023-08-22 RX ADMIN — MEMANTINE HYDROCHLORIDE 10 MILLIGRAM(S): 10 TABLET ORAL at 23:23

## 2023-08-22 RX ADMIN — LISINOPRIL 40 MILLIGRAM(S): 2.5 TABLET ORAL at 06:19

## 2023-08-22 RX ADMIN — CEFEPIME 1000 MILLIGRAM(S): 1 INJECTION, POWDER, FOR SOLUTION INTRAMUSCULAR; INTRAVENOUS at 21:04

## 2023-08-22 RX ADMIN — CEFEPIME 1000 MILLIGRAM(S): 1 INJECTION, POWDER, FOR SOLUTION INTRAMUSCULAR; INTRAVENOUS at 06:19

## 2023-08-22 RX ADMIN — ATORVASTATIN CALCIUM 40 MILLIGRAM(S): 80 TABLET, FILM COATED ORAL at 21:03

## 2023-08-22 NOTE — DIETITIAN INITIAL EVALUATION ADULT - ORAL INTAKE PTA/DIET HISTORY
Pt with history of vascular dementia; poor historian; unable to provide nutrition history.   Pt had swallow evaluation on 8/21: diet advanced to puree with thin liquid

## 2023-08-22 NOTE — DIETITIAN INITIAL EVALUATION ADULT - PERTINENT LABORATORY DATA
08-22    141  |  106  |  10.4  ----------------------------<  95  4.0   |  23.0  |  0.89    Ca    8.6      22 Aug 2023 03:17    TPro  7.6  /  Alb  3.4  /  TBili  0.4  /  DBili  x   /  AST  22  /  ALT  21  /  AlkPhos  74  08-20

## 2023-08-22 NOTE — DIETITIAN INITIAL EVALUATION ADULT - ETIOLOGY
Related to inability to meet sufficient protein energy requirements in setting of vascular dementia; now w/ sepsis likely 2/2 Aspiration PNA

## 2023-08-22 NOTE — PROGRESS NOTE ADULT - ASSESSMENT
63y/oM PMH vascular dementia presenting with vomiting, suspected aspiration. Febrile to 102 in ER, admitted for further w/u.     Sepsis 2/2 aspiration pneumonia   dysphagia   poss cellulitis   -cont abx   -cxr LLL infiltrate   -CT chest: L mid to LLL opacities, atelectasis with poss superimposed pna  -bcx ngtd   -ucx neg   -SLP appreciated: puree, thin liquids       HTN, HLD   -benazepril (lisinopril in house)   -lipitor     Vascular dementia   -donepezil, memantine     vte ppx: lovenox sq   PT eval ordered     pt's sister updated

## 2023-08-22 NOTE — DIETITIAN INITIAL EVALUATION ADULT - OTHER INFO
Pt is a 64 y/o nonverbal M with PMH of vascular dementia presents with vomiting and possible aspiration. On arrival to the ED the patient was found to have a fever of 102F.   Pt admitted with Sepsis likely 2/2 Aspiration PNA

## 2023-08-22 NOTE — DIETITIAN INITIAL EVALUATION ADULT - PERTINENT MEDS FT
MEDICATIONS  (STANDING):  atorvastatin 40 milliGRAM(s) Oral at bedtime  cefepime  Injectable.      cefepime  Injectable. 1000 milliGRAM(s) IV Push every 8 hours  donepezil 10 milliGRAM(s) Oral at bedtime  enoxaparin Injectable 40 milliGRAM(s) SubCutaneous every 24 hours  lisinopril 40 milliGRAM(s) Oral daily  memantine 10 milliGRAM(s) Oral at bedtime  sodium chloride 0.9%. 1000 milliLiter(s) (75 mL/Hr) IV Continuous <Continuous>    MEDICATIONS  (PRN):  acetaminophen     Tablet .. 650 milliGRAM(s) Oral every 6 hours PRN Temp greater or equal to 38.5C (101.3F)

## 2023-08-23 ENCOUNTER — TRANSCRIPTION ENCOUNTER (OUTPATIENT)
Age: 63
End: 2023-08-23

## 2023-08-23 LAB
ANION GAP SERPL CALC-SCNC: 13 MMOL/L — SIGNIFICANT CHANGE UP (ref 5–17)
BUN SERPL-MCNC: 11.3 MG/DL — SIGNIFICANT CHANGE UP (ref 8–20)
CALCIUM SERPL-MCNC: 8.9 MG/DL — SIGNIFICANT CHANGE UP (ref 8.4–10.5)
CHLORIDE SERPL-SCNC: 103 MMOL/L — SIGNIFICANT CHANGE UP (ref 96–108)
CO2 SERPL-SCNC: 23 MMOL/L — SIGNIFICANT CHANGE UP (ref 22–29)
CREAT SERPL-MCNC: 1 MG/DL — SIGNIFICANT CHANGE UP (ref 0.5–1.3)
EGFR: 85 ML/MIN/1.73M2 — SIGNIFICANT CHANGE UP
GLUCOSE SERPL-MCNC: 99 MG/DL — SIGNIFICANT CHANGE UP (ref 70–99)
HCT VFR BLD CALC: 42.5 % — SIGNIFICANT CHANGE UP (ref 39–50)
HGB BLD-MCNC: 13.9 G/DL — SIGNIFICANT CHANGE UP (ref 13–17)
MAGNESIUM SERPL-MCNC: 2 MG/DL — SIGNIFICANT CHANGE UP (ref 1.6–2.6)
MCHC RBC-ENTMCNC: 29 PG — SIGNIFICANT CHANGE UP (ref 27–34)
MCHC RBC-ENTMCNC: 32.7 GM/DL — SIGNIFICANT CHANGE UP (ref 32–36)
MCV RBC AUTO: 88.7 FL — SIGNIFICANT CHANGE UP (ref 80–100)
PLATELET # BLD AUTO: 316 K/UL — SIGNIFICANT CHANGE UP (ref 150–400)
POTASSIUM SERPL-MCNC: 4.1 MMOL/L — SIGNIFICANT CHANGE UP (ref 3.5–5.3)
POTASSIUM SERPL-SCNC: 4.1 MMOL/L — SIGNIFICANT CHANGE UP (ref 3.5–5.3)
RBC # BLD: 4.79 M/UL — SIGNIFICANT CHANGE UP (ref 4.2–5.8)
RBC # FLD: 12.1 % — SIGNIFICANT CHANGE UP (ref 10.3–14.5)
SODIUM SERPL-SCNC: 139 MMOL/L — SIGNIFICANT CHANGE UP (ref 135–145)
WBC # BLD: 9.98 K/UL — SIGNIFICANT CHANGE UP (ref 3.8–10.5)
WBC # FLD AUTO: 9.98 K/UL — SIGNIFICANT CHANGE UP (ref 3.8–10.5)

## 2023-08-23 PROCEDURE — 99232 SBSQ HOSP IP/OBS MODERATE 35: CPT

## 2023-08-23 PROCEDURE — 71045 X-RAY EXAM CHEST 1 VIEW: CPT | Mod: 26

## 2023-08-23 RX ORDER — SODIUM CHLORIDE 9 MG/ML
1000 INJECTION INTRAMUSCULAR; INTRAVENOUS; SUBCUTANEOUS
Refills: 0 | Status: COMPLETED | OUTPATIENT
Start: 2023-08-23 | End: 2023-08-24

## 2023-08-23 RX ADMIN — LISINOPRIL 40 MILLIGRAM(S): 2.5 TABLET ORAL at 06:11

## 2023-08-23 RX ADMIN — CEFEPIME 1000 MILLIGRAM(S): 1 INJECTION, POWDER, FOR SOLUTION INTRAMUSCULAR; INTRAVENOUS at 06:10

## 2023-08-23 RX ADMIN — ENOXAPARIN SODIUM 40 MILLIGRAM(S): 100 INJECTION SUBCUTANEOUS at 14:13

## 2023-08-23 RX ADMIN — CEFEPIME 1000 MILLIGRAM(S): 1 INJECTION, POWDER, FOR SOLUTION INTRAMUSCULAR; INTRAVENOUS at 22:47

## 2023-08-23 RX ADMIN — DONEPEZIL HYDROCHLORIDE 10 MILLIGRAM(S): 10 TABLET, FILM COATED ORAL at 22:48

## 2023-08-23 RX ADMIN — CEFEPIME 1000 MILLIGRAM(S): 1 INJECTION, POWDER, FOR SOLUTION INTRAMUSCULAR; INTRAVENOUS at 14:13

## 2023-08-23 RX ADMIN — SODIUM CHLORIDE 75 MILLILITER(S): 9 INJECTION INTRAMUSCULAR; INTRAVENOUS; SUBCUTANEOUS at 16:09

## 2023-08-23 RX ADMIN — MEMANTINE HYDROCHLORIDE 10 MILLIGRAM(S): 10 TABLET ORAL at 22:48

## 2023-08-23 RX ADMIN — ATORVASTATIN CALCIUM 40 MILLIGRAM(S): 80 TABLET, FILM COATED ORAL at 22:47

## 2023-08-23 NOTE — DISCHARGE NOTE PROVIDER - CARE PROVIDER_API CALL
Presley Billingsley  Internal Medicine  [address] [place] [id number]  Phone: [phone]  Fax: [phone]  Follow Up Time:     neurology,   Phone: (   )    -  Fax: (   )    -  Follow Up Time:

## 2023-08-23 NOTE — DISCHARGE NOTE PROVIDER - NSDCFUSCHEDAPPT_GEN_ALL_CORE_FT
Presley Billingsley  [facility]  INT[facility] [address]  Scheduled Appointment: 10/30/2023

## 2023-08-23 NOTE — PROGRESS NOTE ADULT - NUTRITIONAL ASSESSMENT
This patient has been assessed with a concern for Malnutrition and has been determined to have a diagnosis/diagnoses of Moderate protein-calorie malnutrition.    This patient is being managed with:   Diet Pureed-  DASH/TLC {Sodium & Cholesterol Restricted} (DASH)  Entered: Aug 20 2023 12:59PM  
This patient has been assessed with a concern for Malnutrition and has been determined to have a diagnosis/diagnoses of Moderate protein-calorie malnutrition.    This patient is being managed with:   Diet Pureed-  No Liquids (NOLIQUIDS)  Entered: Aug 23 2023  8:00AM

## 2023-08-23 NOTE — DISCHARGE NOTE PROVIDER - NSDCHHASSISTDEVIC_GEN_ALL_CORE_FT
pt would be bed bound without trupti lift  pt requires a trupti for transfers from bed to chair and would be bed bound without it

## 2023-08-23 NOTE — PHYSICAL THERAPY INITIAL EVALUATION ADULT - ADDITIONAL COMMENTS
Pt unable to provide information about PLF and social support, non verbal; as per [facility] pt with 24/7 assistance, resides in the house with Brother owns hospital bed, W/C, RW

## 2023-08-23 NOTE — PHYSICAL THERAPY INITIAL EVALUATION ADULT - ORIENTATION, REHAB EVAL
pt non verbal unable to follow commands (verbal, demonstration)/not oriented to person, place, time or situation

## 2023-08-23 NOTE — DISCHARGE NOTE PROVIDER - ATTENDING DISCHARGE PHYSICAL EXAMINATION:
Vital Signs Last 24 Hrs  T(C): 36.7 (24 Aug 2023 07:49), Max: 37.2 (23 Aug 2023 16:16)  T(F): 98 (24 Aug 2023 07:49), Max: 99 (23 Aug 2023 16:16)  HR: 72 (24 Aug 2023 07:49) (72 - 93)  BP: 103/64 (24 Aug 2023 07:49) (98/64 - 116/80)  BP(mean): --  RR: 18 (24 Aug 2023 07:49) (18 - 19)  SpO2: 96% (24 Aug 2023 07:49) (96% - 97%)    Parameters below as of 24 Aug 2023 07:49  Patient On (Oxygen Delivery Method): room air    GENERAL: NAD  NECK: soft, supple  CHEST/LUNG: wet cough noted; respirations unlabored on RA   HEART: S1S2+, Regular rate and rhythm  ABDOMEN: Soft, Nontender, Nondistended; Bowel sounds present  SKIN: warm, dry; RLE mild erythema  NEURO: Awake, alert, opens eyes responds to name

## 2023-08-23 NOTE — PHYSICAL THERAPY INITIAL EVALUATION ADULT - PERTINENT HX OF CURRENT PROBLEM, REHAB EVAL
as per medical chart: presenting with vomiting, suspected aspiration. Febrile to 102 in ER, admitted for further w/u, (+) sepsis due to aspiration

## 2023-08-23 NOTE — PROGRESS NOTE ADULT - ASSESSMENT
63y/oM PMH vascular dementia presenting with vomiting, suspected aspiration. Febrile to 102 in ER, admitted for further w/u.     Sepsis 2/2 aspiration pneumonia   dysphagia   poss cellulitis   -cont abx   -cxr LLL infiltrate   -CT chest: L mid to LLL opacities, atelectasis with poss superimposed pna  -bcx ngtd   -ucx neg   -SLP following   -changed to puree, no liquids today 8/23  -f/u MBS tomorrow 8/24      HTN, HLD   -benazepril (lisinopril in house)   -lipitor     Vascular dementia   -donepezil, memantine     vte ppx: lovenox sq   PT eval appreciated       63y/oM PMH vascular dementia presenting with vomiting, suspected aspiration. Febrile to 102 in ER, admitted for further w/u.     Sepsis 2/2 aspiration pneumonia   dysphagia   poss cellulitis   -cont abx   -cxr LLL infiltrate   -CT chest: L mid to LLL opacities, atelectasis with poss superimposed pna  -bcx ngtd   -ucx neg   -SLP following   -changed to puree, no liquids today 8/23  -f/u repeat cxr, reviewed, pending official read   -f/u [facility] [place] 8/24    HTN, HLD   -benazepril (lisinopril in house)   -lipitor     Vascular dementia   -donepezil, memantine     vte ppx: lovenox sq   PT eval appreciated       63y/oM PMH vascular dementia presenting with vomiting, suspected aspiration. Febrile to 102 in ER, admitted for further w/u.     Sepsis 2/2 aspiration pneumonia   dysphagia   poss cellulitis   -cont abx   -cxr LLL infiltrate   -CT chest: L mid to LLL opacities, atelectasis with poss superimposed pna  -bcx ngtd   -ucx neg   -SLP following   -changed to puree, no liquids today 8/23  -f/u repeat cxr, reviewed, pending official read   -f/u [facility] tomorrow 8/24    HTN, HLD   -benazepril (lisinopril in house)   -lipitor     Vascular dementia   -donepezil, memantine     vte ppx: lovenox sq   PT eval appreciated, recommending mechanical lift for home. pt would likely be bed bound without trupti lift

## 2023-08-23 NOTE — DISCHARGE NOTE PROVIDER - HOSPITAL COURSE
63y/oM PMH vascular dementia presenting with vomiting, suspected aspiration. Febrile to 102 in ER, admitted for further w/u. CXR with LLL infiltrate. CT chest with L mid to lower lobe opacities, atelectasis with poss superimposed pna. seen by SLP, now s/p MBS 8/24; rec easy to chew solids with thin liquids. pt to continue abx to complete course. abx coverage also started for RLE cellulitis, improving. Pt to dc home with resumption of home aides.

## 2023-08-23 NOTE — PHYSICAL THERAPY INITIAL EVALUATION ADULT - PASSIVE RANGE OF MOTION EXAMINATION, REHAB EVAL
left UE elbow and wrist flexion contraction noted, with increased tone and limited shoulder ROM in all major planes/Right UE Passive ROM was WFL (within functional limits)/bilateral lower extremity Passive ROM was WFL (within functional limits)

## 2023-08-23 NOTE — DISCHARGE NOTE PROVIDER - DETAILS OF MALNUTRITION DIAGNOSIS/DIAGNOSES
This patient has been assessed with a concern for Malnutrition and was treated during this hospitalization for the following Nutrition diagnosis/diagnoses:     -  08/22/2023: Moderate protein-calorie malnutrition

## 2023-08-23 NOTE — DISCHARGE NOTE PROVIDER - NSDCMRMEDTOKEN_GEN_ALL_CORE_FT
benazepril 40 mg oral tablet: 1 orally once a day  calamine topical lotion: 1 application topically 3 times a day  donepezil 10 mg oral tablet: 1 orally once a day (at bedtime)  Lipitor 40 mg oral tablet: 1 orally once a day (at bedtime)  memantine 10 mg oral tablet: 1 orally once a day (at bedtime)  triamcinolone 0.1% topical ointment: 1 application topically 3 times a day   amoxicillin-clavulanate 875 mg-125 mg oral tablet: 1 tab(s) orally 2 times a day  benazepril 40 mg oral tablet: 1 orally once a day  calamine topical lotion: 1 application topically 3 times a day  donepezil 10 mg oral tablet: 1 orally once a day (at bedtime)  Lipitor 40 mg oral tablet: 1 orally once a day (at bedtime)  memantine 10 mg oral tablet: 1 orally once a day (at bedtime)  triamcinolone 0.1% topical ointment: 1 application topically 3 times a day

## 2023-08-23 NOTE — PROGRESS NOTE ADULT - SUBJECTIVE AND OBJECTIVE BOX
SHELLY ARMANDO    [id number]    63y      Male    CC: fever    INTERVAL HPI/OVERNIGHT EVENTS: pt seen and examined. no acute events reported o/n.     REVIEW OF SYSTEMS:  unable to obtain     Vital Signs Last 24 Hrs  T(C): 36.8 (22 Aug 2023 16:38), Max: 37.1 (22 Aug 2023 01:23)  T(F): 98.2 (22 Aug 2023 16:38), Max: 98.7 (22 Aug 2023 01:23)  HR: 84 (22 Aug 2023 16:38) (66 - 94)  BP: 129/78 (22 Aug 2023 16:38) (109/79 - 147/88)  BP(mean): --  RR: 18 (22 Aug 2023 16:38) (18 - 19)  SpO2: 96% (22 Aug 2023 16:38) (95% - 97%)    Parameters below as of 22 Aug 2023 16:38  Patient On (Oxygen Delivery Method): room air        PHYSICAL EXAM:    GENERAL: NAD  NECK: soft, supple  CHEST/LUNG: Clear to auscultation bilaterally  HEART: S1S2+, Regular rate and rhythm  ABDOMEN: Soft, Nontender, Nondistended; Bowel sounds present  SKIN: warm, dry; RLE mild erythema, slight ankle edema  NEURO: Awake, responds to name   PSYCH: normal mood    LABS:                        13.5   11.42 )-----------( 249      ( 22 Aug 2023 03:17 )             41.3     08-22    141  |  106  |  10.4  ----------------------------<  95  4.0   |  23.0  |  0.89    Ca    8.6      22 Aug 2023 03:17        Urinalysis Basic - ( 22 Aug 2023 03:17 )    Color: x / Appearance: x / SG: x / pH: x  Gluc: 95 mg/dL / Ketone: x  / Bili: x / Urobili: x   Blood: x / Protein: x / Nitrite: x   Leuk Esterase: x / RBC: x / WBC x   Sq Epi: x / Non Sq Epi: x / Bacteria: x          MEDICATIONS  (STANDING):  atorvastatin 40 milliGRAM(s) Oral at bedtime  cefepime  Injectable. 1000 milliGRAM(s) IV Push every 8 hours  cefepime  Injectable.      donepezil 10 milliGRAM(s) Oral at bedtime  enoxaparin Injectable 40 milliGRAM(s) SubCutaneous every 24 hours  lisinopril 40 milliGRAM(s) Oral daily  memantine 10 milliGRAM(s) Oral at bedtime  sodium chloride 0.9%. 1000 milliLiter(s) (75 mL/Hr) IV Continuous <Continuous>    MEDICATIONS  (PRN):  acetaminophen     Tablet .. 650 milliGRAM(s) Oral every 6 hours PRN Temp greater or equal to 38.5C (101.3F)      RADIOLOGY & ADDITIONAL TESTS:  
SHELLY ARMANDO    [id number]    63y      Male    CC: fever    INTERVAL HPI/OVERNIGHT EVENTS: pt seen and examined. this am with concern for aspiration event.     REVIEW OF SYSTEMS:  unable to obtain     Vital Signs Last 24 Hrs  T(C): 36.8 (23 Aug 2023 08:02), Max: 36.9 (22 Aug 2023 19:03)  T(F): 98.2 (23 Aug 2023 08:02), Max: 98.4 (22 Aug 2023 19:03)  HR: 83 (23 Aug 2023 08:02) (66 - 92)  BP: 125/82 (23 Aug 2023 08:02) (106/63 - 146/80)  BP(mean): 99 (22 Aug 2023 20:58) (99 - 99)  RR: 18 (23 Aug 2023 08:02) (18 - 19)  SpO2: 98% (23 Aug 2023 08:02) (95% - 98%)    Parameters below as of 23 Aug 2023 08:02  Patient On (Oxygen Delivery Method): room air        PHYSICAL EXAM:    GENERAL: NAD  NECK: soft, supple  CHEST/LUNG: wet cough noted; respirations unlabored on NC   HEART: S1S2+, Regular rate and rhythm  ABDOMEN: Soft, Nontender, Nondistended; Bowel sounds present  SKIN: warm, dry; RLE mild erythema, slight ankle edema  NEURO: Awake, opens eyes responds to name     LABS:                        13.9   9.98  )-----------( 316      ( 23 Aug 2023 06:10 )             42.5     08-23    139  |  103  |  11.3  ----------------------------<  99  4.1   |  23.0  |  1.00    Ca    8.9      23 Aug 2023 06:10  Mg     2.0     08-23        Urinalysis Basic - ( 23 Aug 2023 06:10 )    Color: x / Appearance: x / SG: x / pH: x  Gluc: 99 mg/dL / Ketone: x  / Bili: x / Urobili: x   Blood: x / Protein: x / Nitrite: x   Leuk Esterase: x / RBC: x / WBC x   Sq Epi: x / Non Sq Epi: x / Bacteria: x          MEDICATIONS  (STANDING):  atorvastatin 40 milliGRAM(s) Oral at bedtime  cefepime  Injectable.      cefepime  Injectable. 1000 milliGRAM(s) IV Push every 8 hours  donepezil 10 milliGRAM(s) Oral at bedtime  enoxaparin Injectable 40 milliGRAM(s) SubCutaneous every 24 hours  lisinopril 40 milliGRAM(s) Oral daily  memantine 10 milliGRAM(s) Oral at bedtime  sodium chloride 0.9%. 1000 milliLiter(s) (75 mL/Hr) IV Continuous <Continuous>    MEDICATIONS  (PRN):  acetaminophen     Tablet .. 650 milliGRAM(s) Oral every 6 hours PRN Temp greater or equal to 38.5C (101.3F)      RADIOLOGY & ADDITIONAL TESTS:  
Hospitalist Progress Note    Chief Complaint:  Fever    SUBJECTIVE / OVERNIGHT EVENTS:  No events overnight, patient seen at bedside, in NAD,   Nonverbal. Unable to obtain ROS due to mental status.    MEDICATIONS  (STANDING):  atorvastatin 40 milliGRAM(s) Oral at bedtime  cefepime  Injectable. 1000 milliGRAM(s) IV Push every 8 hours  cefepime  Injectable.      donepezil 10 milliGRAM(s) Oral at bedtime  enoxaparin Injectable 40 milliGRAM(s) SubCutaneous every 24 hours  lisinopril 40 milliGRAM(s) Oral daily  memantine 10 milliGRAM(s) Oral at bedtime  sodium chloride 0.9%. 1000 milliLiter(s) (75 mL/Hr) IV Continuous <Continuous>    MEDICATIONS  (PRN):  acetaminophen     Tablet .. 650 milliGRAM(s) Oral every 6 hours PRN Temp greater or equal to 38.5C (101.3F)        I&O's Summary    20 Aug 2023 07:01  -  21 Aug 2023 07:00  --------------------------------------------------------  IN: 630 mL / OUT: 0 mL / NET: 630 mL        PHYSICAL EXAM:  Vital Signs Last 24 Hrs  T(C): 37 (21 Aug 2023 12:01), Max: 37.6 (20 Aug 2023 23:15)  T(F): 98.6 (21 Aug 2023 12:01), Max: 99.6 (20 Aug 2023 23:15)  HR: 88 (21 Aug 2023 12:01) (85 - 100)  BP: 109/71 (21 Aug 2023 12:01) (107/77 - 152/75)  BP(mean): --  RR: 18 (21 Aug 2023 12:01) (16 - 20)  SpO2: 97% (21 Aug 2023 12:01) (94% - 97%)    Parameters below as of 21 Aug 2023 12:01  Patient On (Oxygen Delivery Method): room air        Constitutional: opening eyes to verbal stimuli, not following commands  Neck: Soft   Respiratory: Clear to auscultation bilaterally, no wheezes or crackles  Cardiovascular: Regular rate and rhythm  Gastrointestinal: Soft  Vascular: 2+ peripheral pulses  Neurological: nonverbal, opening eyes  Musculoskeletal: 5/5 strength b/l upper and lower extremities, no lower extremity edema bilaterally  Skin: right lower extremity warmth and erythema    LABS:                        13.2   11.37 )-----------( 210      ( 21 Aug 2023 03:11 )             40.3     08-21    142  |  109<H>  |  15.7  ----------------------------<  101<H>  3.5   |  23.0  |  0.99    Ca    8.2<L>      21 Aug 2023 03:11    TPro  7.6  /  Alb  3.4  /  TBili  0.4  /  DBili  x   /  AST  22  /  ALT  21  /  AlkPhos  74  08-20          Urinalysis Basic - ( 21 Aug 2023 03:11 )    Color: x / Appearance: x / SG: x / pH: x  Gluc: 101 mg/dL / Ketone: x  / Bili: x / Urobili: x   Blood: x / Protein: x / Nitrite: x   Leuk Esterase: x / RBC: x / WBC x   Sq Epi: x / Non Sq Epi: x / Bacteria: x        Culture - Urine (collected 20 Aug 2023 10:46)  Source: Catheterized Catheterized  Final Report (21 Aug 2023 14:15):    <10,000 CFU/mL Normal Urogenital Isis      CAPILLARY BLOOD GLUCOSE            RADIOLOGY & ADDITIONAL TESTS:  Results Reviewed: Y  Imaging Personally Reviewed: N  Electrocardiogram Personally Reviewed: N

## 2023-08-23 NOTE — DISCHARGE NOTE PROVIDER - NSDCCPCAREPLAN_GEN_ALL_CORE_FT
PRINCIPAL DISCHARGE DIAGNOSIS  Diagnosis: Cellulitis  Assessment and Plan of Treatment: improving   cont local wound care      SECONDARY DISCHARGE DIAGNOSES  Diagnosis: Pneumonia, aspiration  Assessment and Plan of Treatment:     Diagnosis: Dysphagia  Assessment and Plan of Treatment: had modified barium swallow evaluation: recommending easy to chew solids, thin fluids, allow swallow between intakes, maintain upright posture during/after eating for 30min, provide rest periods between swallows, small sips/bites

## 2023-08-23 NOTE — PHYSICAL THERAPY INITIAL EVALUATION ADULT - PRECAUTIONS/LIMITATIONS, REHAB EVAL
aspiration precautions/cardiac precautions/fall precautions/oxygen therapy device and L/min/swallowing precautions

## 2023-08-23 NOTE — PHYSICAL THERAPY INITIAL EVALUATION ADULT - IMPAIRMENTS CONTRIBUTING IMPAIRED BED MOBILITY, REHAB EVAL
cognition/decreased flexibility/impaired motor control/abnormal muscle tone/decreased ROM/decreased strength

## 2023-08-24 ENCOUNTER — TRANSCRIPTION ENCOUNTER (OUTPATIENT)
Age: 63
End: 2023-08-24

## 2023-08-24 VITALS
HEART RATE: 75 BPM | TEMPERATURE: 98 F | RESPIRATION RATE: 19 BRPM | SYSTOLIC BLOOD PRESSURE: 122 MMHG | OXYGEN SATURATION: 99 % | DIASTOLIC BLOOD PRESSURE: 83 MMHG

## 2023-08-24 LAB
ANION GAP SERPL CALC-SCNC: 10 MMOL/L — SIGNIFICANT CHANGE UP (ref 5–17)
BUN SERPL-MCNC: 11.3 MG/DL — SIGNIFICANT CHANGE UP (ref 8–20)
CALCIUM SERPL-MCNC: 8.8 MG/DL — SIGNIFICANT CHANGE UP (ref 8.4–10.5)
CHLORIDE SERPL-SCNC: 109 MMOL/L — HIGH (ref 96–108)
CO2 SERPL-SCNC: 22 MMOL/L — SIGNIFICANT CHANGE UP (ref 22–29)
CREAT SERPL-MCNC: 0.92 MG/DL — SIGNIFICANT CHANGE UP (ref 0.5–1.3)
EGFR: 94 ML/MIN/1.73M2 — SIGNIFICANT CHANGE UP
GLUCOSE SERPL-MCNC: 94 MG/DL — SIGNIFICANT CHANGE UP (ref 70–99)
HCT VFR BLD CALC: 41.8 % — SIGNIFICANT CHANGE UP (ref 39–50)
HGB BLD-MCNC: 13.6 G/DL — SIGNIFICANT CHANGE UP (ref 13–17)
MAGNESIUM SERPL-MCNC: 2.1 MG/DL — SIGNIFICANT CHANGE UP (ref 1.6–2.6)
MCHC RBC-ENTMCNC: 29.6 PG — SIGNIFICANT CHANGE UP (ref 27–34)
MCHC RBC-ENTMCNC: 32.5 GM/DL — SIGNIFICANT CHANGE UP (ref 32–36)
MCV RBC AUTO: 91.1 FL — SIGNIFICANT CHANGE UP (ref 80–100)
PLATELET # BLD AUTO: 249 K/UL — SIGNIFICANT CHANGE UP (ref 150–400)
POTASSIUM SERPL-MCNC: 4.4 MMOL/L — SIGNIFICANT CHANGE UP (ref 3.5–5.3)
POTASSIUM SERPL-SCNC: 4.4 MMOL/L — SIGNIFICANT CHANGE UP (ref 3.5–5.3)
RBC # BLD: 4.59 M/UL — SIGNIFICANT CHANGE UP (ref 4.2–5.8)
RBC # FLD: 12.1 % — SIGNIFICANT CHANGE UP (ref 10.3–14.5)
SODIUM SERPL-SCNC: 141 MMOL/L — SIGNIFICANT CHANGE UP (ref 135–145)
WBC # BLD: 8.23 K/UL — SIGNIFICANT CHANGE UP (ref 3.8–10.5)
WBC # FLD AUTO: 8.23 K/UL — SIGNIFICANT CHANGE UP (ref 3.8–10.5)

## 2023-08-24 PROCEDURE — 97163 PT EVAL HIGH COMPLEX 45 MIN: CPT

## 2023-08-24 PROCEDURE — 83605 ASSAY OF LACTIC ACID: CPT

## 2023-08-24 PROCEDURE — 87086 URINE CULTURE/COLONY COUNT: CPT

## 2023-08-24 PROCEDURE — 80053 COMPREHEN METABOLIC PANEL: CPT

## 2023-08-24 PROCEDURE — 99239 HOSP IP/OBS DSCHRG MGMT >30: CPT

## 2023-08-24 PROCEDURE — 96374 THER/PROPH/DIAG INJ IV PUSH: CPT

## 2023-08-24 PROCEDURE — 81001 URINALYSIS AUTO W/SCOPE: CPT

## 2023-08-24 PROCEDURE — 87040 BLOOD CULTURE FOR BACTERIA: CPT

## 2023-08-24 PROCEDURE — 85025 COMPLETE CBC W/AUTO DIFF WBC: CPT

## 2023-08-24 PROCEDURE — 92611 MOTION FLUOROSCOPY/SWALLOW: CPT

## 2023-08-24 PROCEDURE — 74230 X-RAY XM SWLNG FUNCJ C+: CPT

## 2023-08-24 PROCEDURE — 83735 ASSAY OF MAGNESIUM: CPT

## 2023-08-24 PROCEDURE — 80048 BASIC METABOLIC PNL TOTAL CA: CPT

## 2023-08-24 PROCEDURE — 71250 CT THORAX DX C-: CPT | Mod: MA

## 2023-08-24 PROCEDURE — 74230 X-RAY XM SWLNG FUNCJ C+: CPT | Mod: 26

## 2023-08-24 PROCEDURE — 85027 COMPLETE CBC AUTOMATED: CPT

## 2023-08-24 PROCEDURE — 71045 X-RAY EXAM CHEST 1 VIEW: CPT

## 2023-08-24 PROCEDURE — 99285 EMERGENCY DEPT VISIT HI MDM: CPT

## 2023-08-24 PROCEDURE — 0225U NFCT DS DNA&RNA 21 SARSCOV2: CPT

## 2023-08-24 PROCEDURE — 96375 TX/PRO/DX INJ NEW DRUG ADDON: CPT

## 2023-08-24 PROCEDURE — 92610 EVALUATE SWALLOWING FUNCTION: CPT

## 2023-08-24 PROCEDURE — 36415 COLL VENOUS BLD VENIPUNCTURE: CPT

## 2023-08-24 PROCEDURE — 92526 ORAL FUNCTION THERAPY: CPT

## 2023-08-24 RX ADMIN — ENOXAPARIN SODIUM 40 MILLIGRAM(S): 100 INJECTION SUBCUTANEOUS at 13:33

## 2023-08-24 RX ADMIN — LISINOPRIL 40 MILLIGRAM(S): 2.5 TABLET ORAL at 06:17

## 2023-08-24 RX ADMIN — CEFEPIME 1000 MILLIGRAM(S): 1 INJECTION, POWDER, FOR SOLUTION INTRAMUSCULAR; INTRAVENOUS at 06:17

## 2023-08-24 RX ADMIN — CEFEPIME 1000 MILLIGRAM(S): 1 INJECTION, POWDER, FOR SOLUTION INTRAMUSCULAR; INTRAVENOUS at 13:33

## 2023-08-24 RX ADMIN — SODIUM CHLORIDE 75 MILLILITER(S): 9 INJECTION INTRAMUSCULAR; INTRAVENOUS; SUBCUTANEOUS at 13:37

## 2023-08-24 NOTE — SWALLOW VFSS/MBS ASSESSMENT ADULT - COMMENTS
As per MD note: "63y/oM PMH vascular dementia presenting with vomiting, suspected aspiration. Febrile to 102 in ER, admitted for further w/u."

## 2023-08-24 NOTE — SWALLOW VFSS/MBS ASSESSMENT ADULT - DIAGNOSTIC IMPRESSIONS
Mild oral dysphagia further impacted by cognition, with reduced lingual strength & coordination.   Pharyngeal stage of swallow WFL: study negative for stasis, penetration &/or aspiration    Cough noted intermittently t/o study: NOT related to PO

## 2023-08-24 NOTE — DISCHARGE NOTE NURSING/CASE MANAGEMENT/SOCIAL WORK - NSSCTYPOFSERV_GEN_ALL_CORE
VISITING NURSE, HOME PHYSICAL THERAPY VISITING NURSE, HOME PHYSICAL THERAPY / SPEECH THERAPY, SOCIAL WORK

## 2023-08-24 NOTE — DISCHARGE NOTE NURSING/CASE MANAGEMENT/SOCIAL WORK - NSDCVIVACCINE_GEN_ALL_CORE_FT
Tdap; 19-Oct-2021 19:01; Pita Jiang (RN); [employer]; [id number] (Exp. Date: 19-Sep-2023); IntraMuscular; Deltoid Right.; 0.5 milliLiter(s); VIS (VIS Published: 09-May-2013, VIS Presented: 19-Oct-2021);

## 2023-08-24 NOTE — SWALLOW VFSS/MBS ASSESSMENT ADULT - ORAL PHASE COMMENTS
piecemeal deglutition oral holding noted (mild to moderate) , due to cognition piecemeal deglutition initial trial only

## 2023-08-24 NOTE — SWALLOW VFSS/MBS ASSESSMENT ADULT - SLP PERTINENT HISTORY OF CURRENT PROBLEM
Pt known to this dept & was seen at bedside this admission. Last seen 8/23 with RX for puree, no fluids via PO with short-term non-oral means of hydration as per pt/family wishes, pending MBS (given hx & medical team concern for aspiration)

## 2023-08-24 NOTE — DISCHARGE NOTE NURSING/CASE MANAGEMENT/SOCIAL WORK - PATIENT PORTAL LINK FT
You can access the FollowMyHealth Patient Portal offered by [facility] by registering at the following website: [email or web address]. By joining [employer]’s FollowMyHealth portal, you will also be able to view your health information using other applications (apps) compatible with our system.

## 2023-08-24 NOTE — SWALLOW VFSS/MBS ASSESSMENT ADULT - ORAL PHASE
Uncontrolled bolus / spillover in amari-pharynx Delayed oral transit time/Uncontrolled bolus / spillover in amari-pharynx/Uncontrolled bolus / spillover in hypopharynx Uncontrolled bolus / spillover in amari-pharynx/Uncontrolled bolus / spillover in hypopharynx

## 2023-08-24 NOTE — DISCHARGE NOTE NURSING/CASE MANAGEMENT/SOCIAL WORK - NSDCPEFALRISK_GEN_ALL_CORE
For information on Fall & Injury Prevention, visit: [email or web address] OR  [email or web address] OR  https://www.cdc.gov/steadi/patient.html

## 2023-08-30 DIAGNOSIS — R13.14 DYSPHAGIA, PHARYNGOESOPHAGEAL PHASE: ICD-10-CM

## 2023-09-12 ENCOUNTER — NON-APPOINTMENT (OUTPATIENT)
Age: 63
End: 2023-09-12

## 2023-10-30 ENCOUNTER — NON-APPOINTMENT (OUTPATIENT)
Age: 63
End: 2023-10-30

## 2023-10-30 ENCOUNTER — RX RENEWAL (OUTPATIENT)
Age: 63
End: 2023-10-30

## 2023-10-30 ENCOUNTER — APPOINTMENT (OUTPATIENT)
Dept: INTERNAL MEDICINE | Facility: CLINIC | Age: 63
End: 2023-10-30
Payer: MEDICARE

## 2023-10-30 VITALS
SYSTOLIC BLOOD PRESSURE: 132 MMHG | WEIGHT: 186 LBS | HEART RATE: 76 BPM | HEIGHT: 66 IN | BODY MASS INDEX: 29.89 KG/M2 | RESPIRATION RATE: 12 BRPM | DIASTOLIC BLOOD PRESSURE: 85 MMHG

## 2023-10-30 DIAGNOSIS — I10 ESSENTIAL (PRIMARY) HYPERTENSION: ICD-10-CM

## 2023-10-30 DIAGNOSIS — Z23 ENCOUNTER FOR IMMUNIZATION: ICD-10-CM

## 2023-10-30 DIAGNOSIS — Z00.00 ENCOUNTER FOR GENERAL ADULT MEDICAL EXAMINATION W/OUT ABNORMAL FINDINGS: ICD-10-CM

## 2023-10-30 DIAGNOSIS — L30.9 DERMATITIS, UNSPECIFIED: ICD-10-CM

## 2023-10-30 DIAGNOSIS — E78.5 HYPERLIPIDEMIA, UNSPECIFIED: ICD-10-CM

## 2023-10-30 PROCEDURE — 36415 COLL VENOUS BLD VENIPUNCTURE: CPT

## 2023-10-30 PROCEDURE — G0008: CPT

## 2023-10-30 PROCEDURE — 90686 IIV4 VACC NO PRSV 0.5 ML IM: CPT

## 2023-10-30 PROCEDURE — 93000 ELECTROCARDIOGRAM COMPLETE: CPT

## 2023-10-30 PROCEDURE — G0439: CPT

## 2023-10-30 RX ORDER — TRIAMCINOLONE ACETONIDE 1 MG/G
0.1 CREAM TOPICAL TWICE DAILY
Qty: 1 | Refills: 3 | Status: ACTIVE | COMMUNITY
Start: 2023-10-30 | End: 1900-01-01

## 2023-10-31 LAB
ALBUMIN SERPL ELPH-MCNC: 4 G/DL
ALP BLD-CCNC: 90 U/L
ALT SERPL-CCNC: 15 U/L
ANION GAP SERPL CALC-SCNC: 11 MMOL/L
AST SERPL-CCNC: 14 U/L
BASOPHILS # BLD AUTO: 0.06 K/UL
BASOPHILS NFR BLD AUTO: 0.7 %
BILIRUB SERPL-MCNC: 0.2 MG/DL
BUN SERPL-MCNC: 15 MG/DL
CALCIUM SERPL-MCNC: 9.9 MG/DL
CHLORIDE SERPL-SCNC: 106 MMOL/L
CHOLEST SERPL-MCNC: 161 MG/DL
CO2 SERPL-SCNC: 26 MMOL/L
CREAT SERPL-MCNC: 1.14 MG/DL
EGFR: 73 ML/MIN/1.73M2
EOSINOPHIL # BLD AUTO: 1.09 K/UL
EOSINOPHIL NFR BLD AUTO: 12 %
ESTIMATED AVERAGE GLUCOSE: 111 MG/DL
GLUCOSE SERPL-MCNC: 94 MG/DL
HBA1C MFR BLD HPLC: 5.5 %
HCT VFR BLD CALC: 47.8 %
HDLC SERPL-MCNC: 35 MG/DL
HGB BLD-MCNC: 15.5 G/DL
IMM GRANULOCYTES NFR BLD AUTO: 0.2 %
LDLC SERPL CALC-MCNC: 104 MG/DL
LYMPHOCYTES # BLD AUTO: 1.99 K/UL
LYMPHOCYTES NFR BLD AUTO: 22 %
MAN DIFF?: NORMAL
MCHC RBC-ENTMCNC: 29 PG
MCHC RBC-ENTMCNC: 32.4 GM/DL
MCV RBC AUTO: 89.3 FL
MONOCYTES # BLD AUTO: 0.72 K/UL
MONOCYTES NFR BLD AUTO: 7.9 %
NEUTROPHILS # BLD AUTO: 5.18 K/UL
NEUTROPHILS NFR BLD AUTO: 57.2 %
NONHDLC SERPL-MCNC: 126 MG/DL
PLATELET # BLD AUTO: 250 K/UL
POTASSIUM SERPL-SCNC: 4.3 MMOL/L
PROT SERPL-MCNC: 7.3 G/DL
PSA SERPL-MCNC: 0.36 NG/ML
RBC # BLD: 5.35 M/UL
RBC # FLD: 12.3 %
SODIUM SERPL-SCNC: 143 MMOL/L
T4 FREE SERPL-MCNC: 1.4 NG/DL
TRIGL SERPL-MCNC: 123 MG/DL
TSH SERPL-ACNC: 2.02 UIU/ML
WBC # FLD AUTO: 9.06 K/UL

## 2024-01-16 ENCOUNTER — RX RENEWAL (OUTPATIENT)
Age: 64
End: 2024-01-16

## 2024-04-12 NOTE — ED ADULT NURSE NOTE - AS [facility]
Call transferred from AUSTIN Couch.  [facility] rep Saeed returning call. She reports this patient would be tentative for visit Tuesday. Informed Saeed that patient has since been reported to be at [facility] ED. Informed Saeed of lab report.  Saeed voiced understanding. Informed this office would follow up on Monday to confirm if patient has returned home or not. Saeed agreed with plan.   (1) completely limited

## 2024-04-15 ENCOUNTER — APPOINTMENT (OUTPATIENT)
Dept: INTERNAL MEDICINE | Facility: CLINIC | Age: 64
End: 2024-04-15
Payer: MEDICARE

## 2024-04-15 DIAGNOSIS — G30.9 ALZHEIMER'S DISEASE, UNSPECIFIED: ICD-10-CM

## 2024-04-15 DIAGNOSIS — R13.10 DYSPHAGIA, UNSPECIFIED: ICD-10-CM

## 2024-04-15 DIAGNOSIS — I10 ESSENTIAL (PRIMARY) HYPERTENSION: ICD-10-CM

## 2024-04-15 DIAGNOSIS — R60.0 LOCALIZED EDEMA: ICD-10-CM

## 2024-04-15 DIAGNOSIS — L89.611 PRESSURE ULCER OF RIGHT HEEL, STAGE 1: ICD-10-CM

## 2024-04-15 DIAGNOSIS — F02.80 ALZHEIMER'S DISEASE, UNSPECIFIED: ICD-10-CM

## 2024-04-15 DIAGNOSIS — L89.621 PRESSURE ULCER OF RIGHT HEEL, STAGE 1: ICD-10-CM

## 2024-04-15 DIAGNOSIS — S31.809A UNSPECIFIED OPEN WOUND OF UNSPECIFIED BUTTOCK, INITIAL ENCOUNTER: ICD-10-CM

## 2024-04-15 PROCEDURE — G0444 DEPRESSION SCREEN ANNUAL: CPT | Mod: 59,2W

## 2024-04-15 PROCEDURE — 99214 OFFICE O/P EST MOD 30 MIN: CPT

## 2024-04-15 NOTE — HEALTH RISK ASSESSMENT
[No] : No [No falls in past year] : Patient reported no falls in the past year [Little interest or pleasure doing things] : 1) Little interest or pleasure doing things [Feeling down, depressed, or hopeless] : 2) Feeling down, depressed, or hopeless [0] : 2) Feeling down, depressed, or hopeless: Not at all (0) [PHQ-2 Negative - No further assessment needed] : PHQ-2 Negative - No further assessment needed [[id number]] : 0 [Never] : Never

## 2024-04-15 NOTE — HISTORY OF PRESENT ILLNESS
[Home] : at home, [unfilled] , at the time of the visit. [Medical Office: ([facility])___] : at the medical office located in  [Formal Caregiver] : formal caregiver [FreeTextEntry1] : follow up [de-identified] : follow up patient is now bedbound, eating puree but brother assures me there are no bed ulcers or ankle ulcers he needs a letter that Mr. MAUREEN Aguayo is taking care of by his brother due to advance congnitive impairment

## 2024-04-15 NOTE — ASSESSMENT
[FreeTextEntry1] : dementia advanced supportive care bed bound  bp takes his meds chol takes his meds brother is the formal caregiver

## 2024-04-15 NOTE — PHYSICAL EXAM
[Normal] : normal rate, regular rhythm, normal S1 and S2 and no murmur heard [de-identified] : no swelling extremeities [de-identified] : alert non verbla [de-identified] : nonverbal bed bound

## 2024-04-18 ENCOUNTER — RX RENEWAL (OUTPATIENT)
Age: 64
End: 2024-04-18

## 2024-05-03 NOTE — ED PROVIDER NOTE - GASTROINTESTINAL, MLM
--------------------------------------------------------------------------------------------------  Please contact our office with any questions or concerns.     Providers book out months in advance please schedule follow up appointments as soon as possible.     Scheduling and Questions: [phone]     services: [phone]    On-call Nephrologist for after hours, weekends and urgent concerns: [phone].    Nephrology Office Fax #: [phone]    Nephrology Nurses  Nurse Triage Line: [phone]     
Abdomen soft, non-tender, no guarding.

## 2025-05-06 NOTE — ED PROVIDER NOTE - RELIEVING FACTORS
Met with patient and discussed that their physician has ordered a referral to our outpatient Phase II Cardiac Rehabilitation program. Reviewed the benefits of cardiac rehabilitation based on their diagnosis and personal risk factors. Patient demonstrates strong interest in Cardiac Rehabilitation at this time.  Cardiac Rehabilitation brochure provided to patient/family. The Cardiac Rehabilitation Program has been provided the patient's referral information and pertinent patient details and history. The patient may call [facility] Cardiac Rehabilitation at [phone] for additional information or questions. Contact information for [facility] Cardiac Rehabilitation and other choices close to the patient's residence have been provided in the discharge instructions so that the patient may call and schedule an appointment when cleared by their physician. Thank you for the referral.  
Today's Date: 5/5/2025  Patient Name: Ruddy Llanes  Date of admission: 5/4/2025 10:51 AM  Patient's age: 73 y.o., 1951female    Admission Dx: Shortness of breath [R06.02]  COPD exacerbation (HCC) [J44.1]  Chest pain, unspecified type [R07.9]    Requesting Physician: Lila Braga MD    Chief Complaint   Patient presents with    Chest Pain     Pt presents to ED due to chest pain x1 week       HISTORY OF PRESENT ILLNESS:      73-year-old pleasant lady that has history of coronary artery disease and had previous stents done to her right coronary artery in the year 2009 at [facility], she also has significant COPD, heavy tobacco use, hypercholesterolemia, and ischemic cardiomyopathy with mild LV dysfunction, presented to the hospital secondary to severe burning across her chest that occurred yesterday and lasted for several hours and has been happening for the last few days.  She is on home oxygen secondary to her advanced COPD.  Her troponin was negative.  Her EKG showed sinus rhythm with no acute changes.  Patient said this pain is similar to what she experienced prior to her stent procedure many years ago and does not appear to be acid reflux or heartburn.      REVIEW OF SYSTEMS:    A comprehensive review of systems is negative except for what is reported above        Past Medical History:   has a past medical history of Anxiety, Back pain, chronic, CAD (coronary artery disease), Chronic obstructive pulmonary disease (HCC), Community acquired bacterial pneumonia, Depression, History of cardiovascular stress test, Hx of blood clots, Hyperlipemia, Hypertension, and Pancreatitis.    Past Surgical History:   has a past surgical history that includes Coronary angioplasty with stent; Hysterectomy; Temporomandibular joint surgery; back surgery; ECHO Compl W Dop Color Flow (1/10/2013); and Hand surgery (Right, 10/12/2019).     Social History:   reports that she has been smoking cigarettes. She started 
none